# Patient Record
Sex: FEMALE | Race: WHITE | Employment: OTHER | URBAN - METROPOLITAN AREA
[De-identification: names, ages, dates, MRNs, and addresses within clinical notes are randomized per-mention and may not be internally consistent; named-entity substitution may affect disease eponyms.]

---

## 2019-01-01 ENCOUNTER — APPOINTMENT (OUTPATIENT)
Dept: CT IMAGING | Age: 74
DRG: 981 | End: 2019-01-01
Attending: INTERNAL MEDICINE
Payer: MEDICARE

## 2019-01-01 ENCOUNTER — APPOINTMENT (OUTPATIENT)
Dept: GENERAL RADIOLOGY | Age: 74
DRG: 981 | End: 2019-01-01
Attending: INTERNAL MEDICINE
Payer: MEDICARE

## 2019-01-01 ENCOUNTER — APPOINTMENT (OUTPATIENT)
Dept: INTERVENTIONAL RADIOLOGY/VASCULAR | Age: 74
DRG: 981 | End: 2019-01-01
Attending: INTERNAL MEDICINE
Payer: MEDICARE

## 2019-01-01 ENCOUNTER — APPOINTMENT (OUTPATIENT)
Dept: NON INVASIVE DIAGNOSTICS | Age: 74
DRG: 981 | End: 2019-01-01
Attending: INTERNAL MEDICINE
Payer: MEDICARE

## 2019-01-01 ENCOUNTER — HOSPITAL ENCOUNTER (INPATIENT)
Age: 74
LOS: 7 days | DRG: 981 | End: 2019-10-25
Attending: EMERGENCY MEDICINE | Admitting: INTERNAL MEDICINE
Payer: MEDICARE

## 2019-01-01 ENCOUNTER — ANESTHESIA EVENT (OUTPATIENT)
Dept: ICU | Age: 74
DRG: 981 | End: 2019-01-01
Payer: MEDICARE

## 2019-01-01 ENCOUNTER — APPOINTMENT (OUTPATIENT)
Dept: GENERAL RADIOLOGY | Age: 74
DRG: 981 | End: 2019-01-01
Attending: RADIOLOGY
Payer: MEDICARE

## 2019-01-01 ENCOUNTER — APPOINTMENT (OUTPATIENT)
Dept: GENERAL RADIOLOGY | Age: 74
DRG: 981 | End: 2019-01-01
Attending: EMERGENCY MEDICINE
Payer: MEDICARE

## 2019-01-01 ENCOUNTER — ANESTHESIA (OUTPATIENT)
Dept: ICU | Age: 74
DRG: 981 | End: 2019-01-01
Payer: MEDICARE

## 2019-01-01 VITALS
SYSTOLIC BLOOD PRESSURE: 85 MMHG | DIASTOLIC BLOOD PRESSURE: 71 MMHG | RESPIRATION RATE: 20 BRPM | HEIGHT: 61 IN | OXYGEN SATURATION: 82 % | HEART RATE: 119 BPM | WEIGHT: 152.4 LBS | BODY MASS INDEX: 28.77 KG/M2 | TEMPERATURE: 98.7 F

## 2019-01-01 DIAGNOSIS — J44.1 COPD EXACERBATION (HCC): Primary | ICD-10-CM

## 2019-01-01 LAB
ADMINISTERED INITIALS, ADMINIT: AC
ADMINISTERED INITIALS, ADMINIT: NORMAL
ALBUMIN SERPL-MCNC: 1.8 G/DL (ref 3.5–5)
ALBUMIN SERPL-MCNC: 1.8 G/DL (ref 3.5–5)
ALBUMIN SERPL-MCNC: 1.9 G/DL (ref 3.5–5)
ALBUMIN SERPL-MCNC: 2.1 G/DL (ref 3.5–5)
ALBUMIN SERPL-MCNC: 2.3 G/DL (ref 3.5–5)
ALBUMIN SERPL-MCNC: 2.6 G/DL (ref 3.5–5)
ALBUMIN SERPL-MCNC: 3.1 G/DL (ref 3.5–5)
ALBUMIN/GLOB SERPL: 0.8 {RATIO} (ref 1.1–2.2)
ALBUMIN/GLOB SERPL: 0.9 {RATIO} (ref 1.1–2.2)
ALBUMIN/GLOB SERPL: 0.9 {RATIO} (ref 1.1–2.2)
ALBUMIN/GLOB SERPL: 1 {RATIO} (ref 1.1–2.2)
ALBUMIN/GLOB SERPL: 1 {RATIO} (ref 1.1–2.2)
ALBUMIN/GLOB SERPL: 1.8 {RATIO} (ref 1.1–2.2)
ALBUMIN/GLOB SERPL: 2.9 {RATIO} (ref 1.1–2.2)
ALP SERPL-CCNC: 190 U/L (ref 45–117)
ALP SERPL-CCNC: 45 U/L (ref 45–117)
ALP SERPL-CCNC: 46 U/L (ref 45–117)
ALP SERPL-CCNC: 46 U/L (ref 45–117)
ALP SERPL-CCNC: 53 U/L (ref 45–117)
ALP SERPL-CCNC: 57 U/L (ref 45–117)
ALP SERPL-CCNC: 94 U/L (ref 45–117)
ALT SERPL-CCNC: 1023 U/L (ref 12–78)
ALT SERPL-CCNC: 154 U/L (ref 12–78)
ALT SERPL-CCNC: 25 U/L (ref 12–78)
ALT SERPL-CCNC: 29 U/L (ref 12–78)
ALT SERPL-CCNC: 64 U/L (ref 12–78)
ALT SERPL-CCNC: 71 U/L (ref 12–78)
ALT SERPL-CCNC: 845 U/L (ref 12–78)
ANION GAP SERPL CALC-SCNC: 11 MMOL/L (ref 5–15)
ANION GAP SERPL CALC-SCNC: 12 MMOL/L (ref 5–15)
ANION GAP SERPL CALC-SCNC: 5 MMOL/L (ref 5–15)
ANION GAP SERPL CALC-SCNC: 7 MMOL/L (ref 5–15)
ANION GAP SERPL CALC-SCNC: 8 MMOL/L (ref 5–15)
ANION GAP SERPL CALC-SCNC: 9 MMOL/L (ref 5–15)
APTT PPP: 64 SEC (ref 22.1–32)
ARTERIAL PATENCY WRIST A: ABNORMAL
ARTERIAL PATENCY WRIST A: YES
AST SERPL-CCNC: 181 U/L (ref 15–37)
AST SERPL-CCNC: 209 U/L (ref 15–37)
AST SERPL-CCNC: 35 U/L (ref 15–37)
AST SERPL-CCNC: 413 U/L (ref 15–37)
AST SERPL-CCNC: 65 U/L (ref 15–37)
AST SERPL-CCNC: >2000 U/L (ref 15–37)
AST SERPL-CCNC: >2000 U/L (ref 15–37)
ATRIAL RATE: 119 BPM
ATRIAL RATE: 124 BPM
ATRIAL RATE: 140 BPM
AV VELOCITY RATIO: 0.86
B PERT DNA SPEC QL NAA+PROBE: NOT DETECTED
BACTERIA SPEC CULT: NORMAL
BACTERIA SPEC CULT: NORMAL
BASE DEFICIT BLDA-SCNC: 10.4 MMOL/L
BASE DEFICIT BLDA-SCNC: 11.8 MMOL/L
BASE DEFICIT BLDA-SCNC: 12.2 MMOL/L
BASE DEFICIT BLDA-SCNC: 4 MMOL/L
BASE DEFICIT BLDA-SCNC: 5.7 MMOL/L
BASE DEFICIT BLDA-SCNC: 6.1 MMOL/L
BASE DEFICIT BLDA-SCNC: 7.4 MMOL/L
BASE EXCESS BLDA CALC-SCNC: 0.8 MMOL/L
BASOPHILS # BLD: 0 K/UL (ref 0–0.1)
BASOPHILS NFR BLD: 0 % (ref 0–1)
BDY SITE: ABNORMAL
BILIRUB DIRECT SERPL-MCNC: 0.2 MG/DL (ref 0–0.2)
BILIRUB DIRECT SERPL-MCNC: 0.2 MG/DL (ref 0–0.2)
BILIRUB DIRECT SERPL-MCNC: 0.3 MG/DL (ref 0–0.2)
BILIRUB SERPL-MCNC: 0.4 MG/DL (ref 0.2–1)
BILIRUB SERPL-MCNC: 0.4 MG/DL (ref 0.2–1)
BILIRUB SERPL-MCNC: 0.5 MG/DL (ref 0.2–1)
BILIRUB SERPL-MCNC: 0.6 MG/DL (ref 0.2–1)
BILIRUB SERPL-MCNC: 0.6 MG/DL (ref 0.2–1)
BILIRUB SERPL-MCNC: 0.7 MG/DL (ref 0.2–1)
BILIRUB SERPL-MCNC: 1 MG/DL (ref 0.2–1)
BLD PROD TYP BPU: NORMAL
BNP SERPL-MCNC: 367 PG/ML
BPU ID: NORMAL
BUN SERPL-MCNC: 24 MG/DL (ref 6–20)
BUN SERPL-MCNC: 29 MG/DL (ref 6–20)
BUN SERPL-MCNC: 29 MG/DL (ref 6–20)
BUN SERPL-MCNC: 44 MG/DL (ref 6–20)
BUN SERPL-MCNC: 44 MG/DL (ref 6–20)
BUN SERPL-MCNC: 50 MG/DL (ref 6–20)
BUN SERPL-MCNC: 50 MG/DL (ref 6–20)
BUN SERPL-MCNC: 52 MG/DL (ref 6–20)
BUN SERPL-MCNC: 53 MG/DL (ref 6–20)
BUN SERPL-MCNC: 53 MG/DL (ref 6–20)
BUN SERPL-MCNC: 54 MG/DL (ref 6–20)
BUN SERPL-MCNC: 57 MG/DL (ref 6–20)
BUN SERPL-MCNC: 58 MG/DL (ref 6–20)
BUN SERPL-MCNC: 58 MG/DL (ref 6–20)
BUN SERPL-MCNC: 59 MG/DL (ref 6–20)
BUN/CREAT SERPL: 19 (ref 12–20)
BUN/CREAT SERPL: 20 (ref 12–20)
BUN/CREAT SERPL: 21 (ref 12–20)
BUN/CREAT SERPL: 21 (ref 12–20)
BUN/CREAT SERPL: 22 (ref 12–20)
BUN/CREAT SERPL: 23 (ref 12–20)
BUN/CREAT SERPL: 25 (ref 12–20)
BUN/CREAT SERPL: 26 (ref 12–20)
BUN/CREAT SERPL: 28 (ref 12–20)
BUN/CREAT SERPL: 32 (ref 12–20)
BUN/CREAT SERPL: 35 (ref 12–20)
C PNEUM DNA SPEC QL NAA+PROBE: NOT DETECTED
CALCIUM SERPL-MCNC: 5.1 MG/DL (ref 8.5–10.1)
CALCIUM SERPL-MCNC: 5.2 MG/DL (ref 8.5–10.1)
CALCIUM SERPL-MCNC: 5.7 MG/DL (ref 8.5–10.1)
CALCIUM SERPL-MCNC: 5.8 MG/DL (ref 8.5–10.1)
CALCIUM SERPL-MCNC: 6 MG/DL (ref 8.5–10.1)
CALCIUM SERPL-MCNC: 6.1 MG/DL (ref 8.5–10.1)
CALCIUM SERPL-MCNC: 6.1 MG/DL (ref 8.5–10.1)
CALCIUM SERPL-MCNC: 6.2 MG/DL (ref 8.5–10.1)
CALCIUM SERPL-MCNC: 6.6 MG/DL (ref 8.5–10.1)
CALCIUM SERPL-MCNC: 6.8 MG/DL (ref 8.5–10.1)
CALCIUM SERPL-MCNC: 6.8 MG/DL (ref 8.5–10.1)
CALCIUM SERPL-MCNC: 7.2 MG/DL (ref 8.5–10.1)
CALCIUM SERPL-MCNC: 7.6 MG/DL (ref 8.5–10.1)
CALCIUM SERPL-MCNC: 7.7 MG/DL (ref 8.5–10.1)
CALCIUM SERPL-MCNC: 8.5 MG/DL (ref 8.5–10.1)
CALCULATED P AXIS, ECG09: 76 DEGREES
CALCULATED P AXIS, ECG09: 78 DEGREES
CALCULATED P AXIS, ECG09: 81 DEGREES
CALCULATED R AXIS, ECG10: -14 DEGREES
CALCULATED R AXIS, ECG10: -46 DEGREES
CALCULATED R AXIS, ECG10: -5 DEGREES
CALCULATED T AXIS, ECG11: 66 DEGREES
CALCULATED T AXIS, ECG11: 76 DEGREES
CALCULATED T AXIS, ECG11: 79 DEGREES
CHLORIDE SERPL-SCNC: 100 MMOL/L (ref 97–108)
CHLORIDE SERPL-SCNC: 101 MMOL/L (ref 97–108)
CHLORIDE SERPL-SCNC: 101 MMOL/L (ref 97–108)
CHLORIDE SERPL-SCNC: 102 MMOL/L (ref 97–108)
CHLORIDE SERPL-SCNC: 102 MMOL/L (ref 97–108)
CHLORIDE SERPL-SCNC: 104 MMOL/L (ref 97–108)
CHLORIDE SERPL-SCNC: 105 MMOL/L (ref 97–108)
CHLORIDE SERPL-SCNC: 94 MMOL/L (ref 97–108)
CHLORIDE SERPL-SCNC: 95 MMOL/L (ref 97–108)
CHLORIDE SERPL-SCNC: 96 MMOL/L (ref 97–108)
CHLORIDE SERPL-SCNC: 96 MMOL/L (ref 97–108)
CHLORIDE SERPL-SCNC: 97 MMOL/L (ref 97–108)
CHLORIDE SERPL-SCNC: 98 MMOL/L (ref 97–108)
CK MB CFR SERPL CALC: 2.9 % (ref 0–2.5)
CK MB SERPL-MCNC: 10.5 NG/ML (ref 5–25)
CK SERPL-CCNC: 363 U/L (ref 26–192)
CO2 SERPL-SCNC: 19 MMOL/L (ref 21–32)
CO2 SERPL-SCNC: 23 MMOL/L (ref 21–32)
CO2 SERPL-SCNC: 23 MMOL/L (ref 21–32)
CO2 SERPL-SCNC: 24 MMOL/L (ref 21–32)
CO2 SERPL-SCNC: 24 MMOL/L (ref 21–32)
CO2 SERPL-SCNC: 25 MMOL/L (ref 21–32)
CO2 SERPL-SCNC: 26 MMOL/L (ref 21–32)
CO2 SERPL-SCNC: 27 MMOL/L (ref 21–32)
CO2 SERPL-SCNC: 28 MMOL/L (ref 21–32)
CO2 SERPL-SCNC: 28 MMOL/L (ref 21–32)
COMMENT, HOLDF: NORMAL
COMMENT, HOLDF: NORMAL
CREAT SERPL-MCNC: 1.25 MG/DL (ref 0.55–1.02)
CREAT SERPL-MCNC: 1.26 MG/DL (ref 0.55–1.02)
CREAT SERPL-MCNC: 1.27 MG/DL (ref 0.55–1.02)
CREAT SERPL-MCNC: 1.28 MG/DL (ref 0.55–1.02)
CREAT SERPL-MCNC: 1.65 MG/DL (ref 0.55–1.02)
CREAT SERPL-MCNC: 1.68 MG/DL (ref 0.55–1.02)
CREAT SERPL-MCNC: 1.78 MG/DL (ref 0.55–1.02)
CREAT SERPL-MCNC: 2.01 MG/DL (ref 0.55–1.02)
CREAT SERPL-MCNC: 2.36 MG/DL (ref 0.55–1.02)
CREAT SERPL-MCNC: 2.55 MG/DL (ref 0.55–1.02)
CREAT SERPL-MCNC: 2.61 MG/DL (ref 0.55–1.02)
CREAT SERPL-MCNC: 2.64 MG/DL (ref 0.55–1.02)
CREAT SERPL-MCNC: 2.75 MG/DL (ref 0.55–1.02)
CREAT SERPL-MCNC: 2.84 MG/DL (ref 0.55–1.02)
CREAT SERPL-MCNC: 2.94 MG/DL (ref 0.55–1.02)
D50 ADMINISTERED, D50ADM: 0 ML
D50 ADMINISTERED, D50ADM: 12 ML
D50 ADMINISTERED, D50ADM: 8 ML
D50 ADMINISTERED, D50ADM: 9 ML
D50 ORDER, D50ORD: 0 ML
D50 ORDER, D50ORD: 12 ML
D50 ORDER, D50ORD: 8 ML
D50 ORDER, D50ORD: 9 ML
DIAGNOSIS, 93000: NORMAL
DIFFERENTIAL METHOD BLD: ABNORMAL
ECHO AO ROOT DIAM: 2.14 CM
ECHO AV AREA PEAK VELOCITY: 2.8 CM2
ECHO AV AREA/BSA PEAK VELOCITY: 1.6 CM2/M2
ECHO AV PEAK GRADIENT: 9.6 MMHG
ECHO AV PEAK VELOCITY: 154.79 CM/S
ECHO IVC SNIFF: 1.62 CM
ECHO LA AREA 4C: 9.5 CM2
ECHO LA MAJOR AXIS: 3.85 CM
ECHO LA TO AORTIC ROOT RATIO: 0.55
ECHO LA VOL 2C: 18.19 ML (ref 22–52)
ECHO LA VOL 4C: 11.86 ML (ref 22–52)
ECHO LA VOL BP: 15.81 ML (ref 22–52)
ECHO LA VOL/BSA BIPLANE: 9.32 ML/M2 (ref 16–28)
ECHO LA VOLUME INDEX A2C: 10.73 ML/M2 (ref 16–28)
ECHO LA VOLUME INDEX A4C: 6.99 ML/M2 (ref 16–28)
ECHO LV E' LATERAL VELOCITY: 9.1 CENTIMETER/SECOND
ECHO LV E' SEPTAL VELOCITY: 4.2 CENTIMETER/SECOND
ECHO LV EDV TEICHHOLZ: 0.11 ML
ECHO LV EJECTION FRACTION BIPLANE: 63.8 % (ref 55–100)
ECHO LV ESV BP: 6.25 ML (ref 19–49)
ECHO LV ESV INDEX BP: 3.7 ML/M2
ECHO LV ESV TEICHHOLZ: 0.04 ML
ECHO LV INTERNAL DIMENSION DIASTOLIC: 12.96 CM (ref 3.9–5.3)
ECHO LV INTERNAL DIMENSION SYSTOLIC: 8.72 CM
ECHO LV IVSD: 0.99 CM (ref 0.6–0.9)
ECHO LV MASS 2D: 53.79 G (ref 67–162)
ECHO LV MASS INDEX 2D: 31.7 G/M2 (ref 43–95)
ECHO LV POSTERIOR WALL DIASTOLIC: 87.1 CM (ref 0.6–0.9)
ECHO LVOT DIAM: 2.04 CM
ECHO LVOT PEAK GRADIENT: 7.1 MMHG
ECHO LVOT PEAK VELOCITY: 133.39 CM/S
ECHO LVOT SV: 11 ML
ECHO MV A VELOCITY: 117.55 CM/S
ECHO MV AREA PHT: 5 CM2
ECHO MV E DECELERATION TIME (DT): 150.2 MS
ECHO MV E VELOCITY: 67.09 CM/S
ECHO MV E/A RATIO: 0.57
ECHO MV PRESSURE HALF TIME (PHT): 43.6 MS
ECHO MV REGURGITANT PEAK GRADIENT: 59.5 MMHG
ECHO MV REGURGITANT PEAK VELOCITY: 385.75 CM/S
ECHO PV MAX VELOCITY: 133.77 CM/S
ECHO PV PEAK GRADIENT: 7.2 MMHG
ECHO RV INTERNAL DIMENSION: 2.04 CM
ECHO RV TAPSE: 1.16 CM (ref 1.5–2)
ECHO TV REGURGITANT MAX VELOCITY: 293.6 CM/S
ECHO TV REGURGITANT PEAK GRADIENT: 34.5 MMHG
EOSINOPHIL # BLD: 0 K/UL (ref 0–0.4)
EOSINOPHIL NFR BLD: 0 % (ref 0–7)
EPAP/CPAP/PEEP, PAPEEP: 5
EPAP/CPAP/PEEP, PAPEEP: 6
ERYTHROCYTE [DISTWIDTH] IN BLOOD BY AUTOMATED COUNT: 13 % (ref 11.5–14.5)
ERYTHROCYTE [DISTWIDTH] IN BLOOD BY AUTOMATED COUNT: 13.1 % (ref 11.5–14.5)
ERYTHROCYTE [DISTWIDTH] IN BLOOD BY AUTOMATED COUNT: 13.2 % (ref 11.5–14.5)
ERYTHROCYTE [DISTWIDTH] IN BLOOD BY AUTOMATED COUNT: 13.3 % (ref 11.5–14.5)
ERYTHROCYTE [DISTWIDTH] IN BLOOD BY AUTOMATED COUNT: 13.4 % (ref 11.5–14.5)
ERYTHROCYTE [DISTWIDTH] IN BLOOD BY AUTOMATED COUNT: 14 % (ref 11.5–14.5)
ERYTHROCYTE [DISTWIDTH] IN BLOOD BY AUTOMATED COUNT: 14.1 % (ref 11.5–14.5)
ERYTHROCYTE [DISTWIDTH] IN BLOOD BY AUTOMATED COUNT: 14.5 % (ref 11.5–14.5)
ERYTHROCYTE [DISTWIDTH] IN BLOOD BY AUTOMATED COUNT: 15 % (ref 11.5–14.5)
EST. AVERAGE GLUCOSE BLD GHB EST-MCNC: 194 MG/DL
FIBRINOGEN PPP-MCNC: 147 MG/DL (ref 200–475)
FIBRINOGEN PPP-MCNC: 150 MG/DL (ref 200–475)
FIO2 ON VENT: 35 %
FIO2 ON VENT: 40 %
FLUAV H1 2009 PAND RNA SPEC QL NAA+PROBE: NOT DETECTED
FLUAV H1 RNA SPEC QL NAA+PROBE: NOT DETECTED
FLUAV H3 RNA SPEC QL NAA+PROBE: NOT DETECTED
FLUAV SUBTYP SPEC NAA+PROBE: NOT DETECTED
FLUBV RNA SPEC QL NAA+PROBE: NOT DETECTED
GAS FLOW.O2 O2 DELIVERY SYS: 2 L/MIN
GAS FLOW.O2 O2 DELIVERY SYS: 3 L/MIN
GAS FLOW.O2 SETTING OXYMISER: 15 L/MIN
GAS FLOW.O2 SETTING OXYMISER: 20 L/MIN
GLOBULIN SER CALC-MCNC: 0.9 G/DL (ref 2–4)
GLOBULIN SER CALC-MCNC: 1.3 G/DL (ref 2–4)
GLOBULIN SER CALC-MCNC: 1.9 G/DL (ref 2–4)
GLOBULIN SER CALC-MCNC: 1.9 G/DL (ref 2–4)
GLOBULIN SER CALC-MCNC: 2 G/DL (ref 2–4)
GLOBULIN SER CALC-MCNC: 2.1 G/DL (ref 2–4)
GLOBULIN SER CALC-MCNC: 3.9 G/DL (ref 2–4)
GLSCOM COMMENTS: NORMAL
GLUCOSE BLD STRIP.AUTO-MCNC: 100 MG/DL (ref 65–100)
GLUCOSE BLD STRIP.AUTO-MCNC: 107 MG/DL (ref 65–100)
GLUCOSE BLD STRIP.AUTO-MCNC: 110 MG/DL (ref 65–100)
GLUCOSE BLD STRIP.AUTO-MCNC: 111 MG/DL (ref 65–100)
GLUCOSE BLD STRIP.AUTO-MCNC: 115 MG/DL (ref 65–100)
GLUCOSE BLD STRIP.AUTO-MCNC: 116 MG/DL (ref 65–100)
GLUCOSE BLD STRIP.AUTO-MCNC: 121 MG/DL (ref 65–100)
GLUCOSE BLD STRIP.AUTO-MCNC: 121 MG/DL (ref 65–100)
GLUCOSE BLD STRIP.AUTO-MCNC: 125 MG/DL (ref 65–100)
GLUCOSE BLD STRIP.AUTO-MCNC: 128 MG/DL (ref 65–100)
GLUCOSE BLD STRIP.AUTO-MCNC: 137 MG/DL (ref 65–100)
GLUCOSE BLD STRIP.AUTO-MCNC: 137 MG/DL (ref 65–100)
GLUCOSE BLD STRIP.AUTO-MCNC: 138 MG/DL (ref 65–100)
GLUCOSE BLD STRIP.AUTO-MCNC: 144 MG/DL (ref 65–100)
GLUCOSE BLD STRIP.AUTO-MCNC: 148 MG/DL (ref 65–100)
GLUCOSE BLD STRIP.AUTO-MCNC: 152 MG/DL (ref 65–100)
GLUCOSE BLD STRIP.AUTO-MCNC: 155 MG/DL (ref 65–100)
GLUCOSE BLD STRIP.AUTO-MCNC: 166 MG/DL (ref 65–100)
GLUCOSE BLD STRIP.AUTO-MCNC: 166 MG/DL (ref 65–100)
GLUCOSE BLD STRIP.AUTO-MCNC: 170 MG/DL (ref 65–100)
GLUCOSE BLD STRIP.AUTO-MCNC: 171 MG/DL (ref 65–100)
GLUCOSE BLD STRIP.AUTO-MCNC: 173 MG/DL (ref 65–100)
GLUCOSE BLD STRIP.AUTO-MCNC: 175 MG/DL (ref 65–100)
GLUCOSE BLD STRIP.AUTO-MCNC: 185 MG/DL (ref 65–100)
GLUCOSE BLD STRIP.AUTO-MCNC: 185 MG/DL (ref 65–100)
GLUCOSE BLD STRIP.AUTO-MCNC: 189 MG/DL (ref 65–100)
GLUCOSE BLD STRIP.AUTO-MCNC: 195 MG/DL (ref 65–100)
GLUCOSE BLD STRIP.AUTO-MCNC: 196 MG/DL (ref 65–100)
GLUCOSE BLD STRIP.AUTO-MCNC: 196 MG/DL (ref 65–100)
GLUCOSE BLD STRIP.AUTO-MCNC: 197 MG/DL (ref 65–100)
GLUCOSE BLD STRIP.AUTO-MCNC: 200 MG/DL (ref 65–100)
GLUCOSE BLD STRIP.AUTO-MCNC: 210 MG/DL (ref 65–100)
GLUCOSE BLD STRIP.AUTO-MCNC: 211 MG/DL (ref 65–100)
GLUCOSE BLD STRIP.AUTO-MCNC: 215 MG/DL (ref 65–100)
GLUCOSE BLD STRIP.AUTO-MCNC: 219 MG/DL (ref 65–100)
GLUCOSE BLD STRIP.AUTO-MCNC: 225 MG/DL (ref 65–100)
GLUCOSE BLD STRIP.AUTO-MCNC: 228 MG/DL (ref 65–100)
GLUCOSE BLD STRIP.AUTO-MCNC: 230 MG/DL (ref 65–100)
GLUCOSE BLD STRIP.AUTO-MCNC: 235 MG/DL (ref 65–100)
GLUCOSE BLD STRIP.AUTO-MCNC: 238 MG/DL (ref 65–100)
GLUCOSE BLD STRIP.AUTO-MCNC: 238 MG/DL (ref 65–100)
GLUCOSE BLD STRIP.AUTO-MCNC: 278 MG/DL (ref 65–100)
GLUCOSE BLD STRIP.AUTO-MCNC: 322 MG/DL (ref 65–100)
GLUCOSE BLD STRIP.AUTO-MCNC: 336 MG/DL (ref 65–100)
GLUCOSE BLD STRIP.AUTO-MCNC: 349 MG/DL (ref 65–100)
GLUCOSE BLD STRIP.AUTO-MCNC: 410 MG/DL (ref 65–100)
GLUCOSE BLD STRIP.AUTO-MCNC: 42 MG/DL (ref 65–100)
GLUCOSE BLD STRIP.AUTO-MCNC: 48 MG/DL (ref 65–100)
GLUCOSE BLD STRIP.AUTO-MCNC: 54 MG/DL (ref 65–100)
GLUCOSE BLD STRIP.AUTO-MCNC: 57 MG/DL (ref 65–100)
GLUCOSE BLD STRIP.AUTO-MCNC: 65 MG/DL (ref 65–100)
GLUCOSE BLD STRIP.AUTO-MCNC: 71 MG/DL (ref 65–100)
GLUCOSE BLD STRIP.AUTO-MCNC: 71 MG/DL (ref 65–100)
GLUCOSE BLD STRIP.AUTO-MCNC: 78 MG/DL (ref 65–100)
GLUCOSE BLD STRIP.AUTO-MCNC: 80 MG/DL (ref 65–100)
GLUCOSE BLD STRIP.AUTO-MCNC: 80 MG/DL (ref 65–100)
GLUCOSE BLD STRIP.AUTO-MCNC: 86 MG/DL (ref 65–100)
GLUCOSE BLD STRIP.AUTO-MCNC: 92 MG/DL (ref 65–100)
GLUCOSE BLD STRIP.AUTO-MCNC: 95 MG/DL (ref 65–100)
GLUCOSE BLD STRIP.AUTO-MCNC: 98 MG/DL (ref 65–100)
GLUCOSE SERPL-MCNC: 124 MG/DL (ref 65–100)
GLUCOSE SERPL-MCNC: 140 MG/DL (ref 65–100)
GLUCOSE SERPL-MCNC: 154 MG/DL (ref 65–100)
GLUCOSE SERPL-MCNC: 154 MG/DL (ref 65–100)
GLUCOSE SERPL-MCNC: 183 MG/DL (ref 65–100)
GLUCOSE SERPL-MCNC: 184 MG/DL (ref 65–100)
GLUCOSE SERPL-MCNC: 190 MG/DL (ref 65–100)
GLUCOSE SERPL-MCNC: 201 MG/DL (ref 65–100)
GLUCOSE SERPL-MCNC: 204 MG/DL (ref 65–100)
GLUCOSE SERPL-MCNC: 237 MG/DL (ref 65–100)
GLUCOSE SERPL-MCNC: 259 MG/DL (ref 65–100)
GLUCOSE SERPL-MCNC: 267 MG/DL (ref 65–100)
GLUCOSE SERPL-MCNC: 277 MG/DL (ref 65–100)
GLUCOSE SERPL-MCNC: 380 MG/DL (ref 65–100)
GLUCOSE SERPL-MCNC: 96 MG/DL (ref 65–100)
GLUCOSE, GLC: 107 MG/DL
GLUCOSE, GLC: 110 MG/DL
GLUCOSE, GLC: 111 MG/DL
GLUCOSE, GLC: 125 MG/DL
GLUCOSE, GLC: 128 MG/DL
GLUCOSE, GLC: 137 MG/DL
GLUCOSE, GLC: 138 MG/DL
GLUCOSE, GLC: 144 MG/DL
GLUCOSE, GLC: 148 MG/DL
GLUCOSE, GLC: 152 MG/DL
GLUCOSE, GLC: 166 MG/DL
GLUCOSE, GLC: 170 MG/DL
GLUCOSE, GLC: 171 MG/DL
GLUCOSE, GLC: 175 MG/DL
GLUCOSE, GLC: 185 MG/DL
GLUCOSE, GLC: 185 MG/DL
GLUCOSE, GLC: 189 MG/DL
GLUCOSE, GLC: 196 MG/DL
GLUCOSE, GLC: 200 MG/DL
GLUCOSE, GLC: 210 MG/DL
GLUCOSE, GLC: 219 MG/DL
GLUCOSE, GLC: 228 MG/DL
GLUCOSE, GLC: 238 MG/DL
GLUCOSE, GLC: 71 MG/DL
GLUCOSE, GLC: 78 MG/DL
GLUCOSE, GLC: 80 MG/DL
GLUCOSE, GLC: 86 MG/DL
GLUCOSE, GLC: 92 MG/DL
HADV DNA SPEC QL NAA+PROBE: NOT DETECTED
HAPTOGLOB SERPL-MCNC: 43 MG/DL (ref 30–200)
HBA1C MFR BLD: 8.4 % (ref 4.2–6.3)
HCO3 BLDA-SCNC: 16 MMOL/L (ref 22–26)
HCO3 BLDA-SCNC: 17 MMOL/L (ref 22–26)
HCO3 BLDA-SCNC: 17 MMOL/L (ref 22–26)
HCO3 BLDA-SCNC: 21 MMOL/L (ref 22–26)
HCO3 BLDA-SCNC: 23 MMOL/L (ref 22–26)
HCO3 BLDA-SCNC: 26 MMOL/L (ref 22–26)
HCOV 229E RNA SPEC QL NAA+PROBE: NOT DETECTED
HCOV HKU1 RNA SPEC QL NAA+PROBE: NOT DETECTED
HCOV NL63 RNA SPEC QL NAA+PROBE: NOT DETECTED
HCOV OC43 RNA SPEC QL NAA+PROBE: NOT DETECTED
HCT VFR BLD AUTO: 15.8 % (ref 35–47)
HCT VFR BLD AUTO: 18.3 % (ref 35–47)
HCT VFR BLD AUTO: 18.4 % (ref 35–47)
HCT VFR BLD AUTO: 19.9 % (ref 35–47)
HCT VFR BLD AUTO: 20.8 % (ref 35–47)
HCT VFR BLD AUTO: 21 % (ref 35–47)
HCT VFR BLD AUTO: 21.3 % (ref 35–47)
HCT VFR BLD AUTO: 22.5 % (ref 35–47)
HCT VFR BLD AUTO: 24.6 % (ref 35–47)
HCT VFR BLD AUTO: 25 % (ref 35–47)
HCT VFR BLD AUTO: 25.5 % (ref 35–47)
HCT VFR BLD AUTO: 27.1 % (ref 35–47)
HCT VFR BLD AUTO: 27.3 % (ref 35–47)
HCT VFR BLD AUTO: 29.4 % (ref 35–47)
HCT VFR BLD AUTO: 30.8 % (ref 35–47)
HCT VFR BLD AUTO: 32.8 % (ref 35–47)
HCT VFR BLD AUTO: 33.1 % (ref 35–47)
HCT VFR BLD AUTO: 33.6 % (ref 35–47)
HCT VFR BLD AUTO: 34.5 % (ref 35–47)
HCT VFR BLD AUTO: 38.6 % (ref 35–47)
HCT VFR BLD AUTO: 41.7 % (ref 35–47)
HGB BLD-MCNC: 10.1 G/DL (ref 11.5–16)
HGB BLD-MCNC: 10.2 G/DL (ref 11.5–16)
HGB BLD-MCNC: 11 G/DL (ref 11.5–16)
HGB BLD-MCNC: 11.1 G/DL (ref 11.5–16)
HGB BLD-MCNC: 11.4 G/DL (ref 11.5–16)
HGB BLD-MCNC: 12.2 G/DL (ref 11.5–16)
HGB BLD-MCNC: 13.9 G/DL (ref 11.5–16)
HGB BLD-MCNC: 5.2 G/DL (ref 11.5–16)
HGB BLD-MCNC: 6.2 G/DL (ref 11.5–16)
HGB BLD-MCNC: 6.4 G/DL (ref 11.5–16)
HGB BLD-MCNC: 6.4 G/DL (ref 11.5–16)
HGB BLD-MCNC: 7 G/DL (ref 11.5–16)
HGB BLD-MCNC: 7 G/DL (ref 11.5–16)
HGB BLD-MCNC: 7.1 G/DL (ref 11.5–16)
HGB BLD-MCNC: 7.7 G/DL (ref 11.5–16)
HGB BLD-MCNC: 7.8 G/DL (ref 11.5–16)
HGB BLD-MCNC: 8.5 G/DL (ref 11.5–16)
HGB BLD-MCNC: 8.7 G/DL (ref 11.5–16)
HGB BLD-MCNC: 9.1 G/DL (ref 11.5–16)
HGB BLD-MCNC: 9.3 G/DL (ref 11.5–16)
HGB BLD-MCNC: 9.9 G/DL (ref 11.5–16)
HIGH TARGET, HITG: 180 MG/DL
HMPV RNA SPEC QL NAA+PROBE: NOT DETECTED
HPIV1 RNA SPEC QL NAA+PROBE: DETECTED
HPIV2 RNA SPEC QL NAA+PROBE: NOT DETECTED
HPIV3 RNA SPEC QL NAA+PROBE: NOT DETECTED
HPIV4 RNA SPEC QL NAA+PROBE: NOT DETECTED
IMM GRANULOCYTES # BLD AUTO: 0 K/UL
IMM GRANULOCYTES # BLD AUTO: 0 K/UL
IMM GRANULOCYTES # BLD AUTO: 0.1 K/UL (ref 0–0.04)
IMM GRANULOCYTES # BLD AUTO: 0.2 K/UL (ref 0–0.04)
IMM GRANULOCYTES NFR BLD AUTO: 0 %
IMM GRANULOCYTES NFR BLD AUTO: 0 %
IMM GRANULOCYTES NFR BLD AUTO: 1 % (ref 0–0.5)
IMM GRANULOCYTES NFR BLD AUTO: 1 % (ref 0–0.5)
INR PPP: 1.3 (ref 0.9–1.1)
INR PPP: 1.7 (ref 0.9–1.1)
INSULIN ADMINSTERED, INSADM: 0 UNITS/HOUR
INSULIN ADMINSTERED, INSADM: 0.5 UNITS/HOUR
INSULIN ADMINSTERED, INSADM: 0.8 UNITS/HOUR
INSULIN ADMINSTERED, INSADM: 0.8 UNITS/HOUR
INSULIN ADMINSTERED, INSADM: 0.9 UNITS/HOUR
INSULIN ADMINSTERED, INSADM: 1 UNITS/HOUR
INSULIN ADMINSTERED, INSADM: 1.4 UNITS/HOUR
INSULIN ADMINSTERED, INSADM: 1.4 UNITS/HOUR
INSULIN ADMINSTERED, INSADM: 2.1 UNITS/HOUR
INSULIN ADMINSTERED, INSADM: 2.2 UNITS/HOUR
INSULIN ADMINSTERED, INSADM: 2.3 UNITS/HOUR
INSULIN ADMINSTERED, INSADM: 2.5 UNITS/HOUR
INSULIN ADMINSTERED, INSADM: 2.6 UNITS/HOUR
INSULIN ADMINSTERED, INSADM: 3 UNITS/HOUR
INSULIN ADMINSTERED, INSADM: 3.8 UNITS/HOUR
INSULIN ADMINSTERED, INSADM: 4.4 UNITS/HOUR
INSULIN ADMINSTERED, INSADM: 5.3 UNITS/HOUR
INSULIN ADMINSTERED, INSADM: 6.4 UNITS/HOUR
INSULIN ORDER, INSORD: 0 UNITS/HOUR
INSULIN ORDER, INSORD: 0.5 UNITS/HOUR
INSULIN ORDER, INSORD: 0.8 UNITS/HOUR
INSULIN ORDER, INSORD: 0.8 UNITS/HOUR
INSULIN ORDER, INSORD: 0.9 UNITS/HOUR
INSULIN ORDER, INSORD: 1 UNITS/HOUR
INSULIN ORDER, INSORD: 1.4 UNITS/HOUR
INSULIN ORDER, INSORD: 1.4 UNITS/HOUR
INSULIN ORDER, INSORD: 2.1 UNITS/HOUR
INSULIN ORDER, INSORD: 2.2 UNITS/HOUR
INSULIN ORDER, INSORD: 2.3 UNITS/HOUR
INSULIN ORDER, INSORD: 2.5 UNITS/HOUR
INSULIN ORDER, INSORD: 2.6 UNITS/HOUR
INSULIN ORDER, INSORD: 3 UNITS/HOUR
INSULIN ORDER, INSORD: 3.8 UNITS/HOUR
INSULIN ORDER, INSORD: 4.4 UNITS/HOUR
INSULIN ORDER, INSORD: 5.3 UNITS/HOUR
INSULIN ORDER, INSORD: 6.4 UNITS/HOUR
IPAP/PIP, IPAPIP: 12
LACTATE SERPL-SCNC: 0.8 MMOL/L (ref 0.4–2)
LACTATE SERPL-SCNC: 2.2 MMOL/L (ref 0.4–2)
LACTATE SERPL-SCNC: 2.2 MMOL/L (ref 0.4–2)
LACTATE SERPL-SCNC: 2.9 MMOL/L (ref 0.4–2)
LACTATE SERPL-SCNC: 2.9 MMOL/L (ref 0.4–2)
LACTATE SERPL-SCNC: 3.3 MMOL/L (ref 0.4–2)
LACTATE SERPL-SCNC: 3.5 MMOL/L (ref 0.4–2)
LACTATE SERPL-SCNC: 3.6 MMOL/L (ref 0.4–2)
LACTATE SERPL-SCNC: 3.7 MMOL/L (ref 0.4–2)
LACTATE SERPL-SCNC: 3.8 MMOL/L (ref 0.4–2)
LACTATE SERPL-SCNC: 4 MMOL/L (ref 0.4–2)
LACTATE SERPL-SCNC: 4.1 MMOL/L (ref 0.4–2)
LACTATE SERPL-SCNC: 4.2 MMOL/L (ref 0.4–2)
LACTATE SERPL-SCNC: 4.9 MMOL/L (ref 0.4–2)
LACTATE SERPL-SCNC: 6.3 MMOL/L (ref 0.4–2)
LACTATE SERPL-SCNC: 8 MMOL/L (ref 0.4–2)
LDH SERPL L TO P-CCNC: 694 U/L (ref 81–246)
LOW TARGET, LOT: 140 MG/DL
LVFS 2D: 32.68 %
LVSV (TEICH): 6.31 ML
LYMPHOCYTES # BLD: 0.3 K/UL (ref 0.8–3.5)
LYMPHOCYTES # BLD: 0.4 K/UL (ref 0.8–3.5)
LYMPHOCYTES NFR BLD: 2 % (ref 12–49)
LYMPHOCYTES NFR BLD: 2 % (ref 12–49)
LYMPHOCYTES NFR BLD: 3 % (ref 12–49)
LYMPHOCYTES NFR BLD: 3 % (ref 12–49)
M PNEUMO DNA SPEC QL NAA+PROBE: NOT DETECTED
MAGNESIUM SERPL-MCNC: 1.8 MG/DL (ref 1.6–2.4)
MAGNESIUM SERPL-MCNC: 1.9 MG/DL (ref 1.6–2.4)
MAGNESIUM SERPL-MCNC: 1.9 MG/DL (ref 1.6–2.4)
MAGNESIUM SERPL-MCNC: 2 MG/DL (ref 1.6–2.4)
MAGNESIUM SERPL-MCNC: 2 MG/DL (ref 1.6–2.4)
MAGNESIUM SERPL-MCNC: 2.1 MG/DL (ref 1.6–2.4)
MCH RBC QN AUTO: 28.7 PG (ref 26–34)
MCH RBC QN AUTO: 28.7 PG (ref 26–34)
MCH RBC QN AUTO: 29.1 PG (ref 26–34)
MCH RBC QN AUTO: 29.2 PG (ref 26–34)
MCH RBC QN AUTO: 29.7 PG (ref 26–34)
MCH RBC QN AUTO: 29.9 PG (ref 26–34)
MCH RBC QN AUTO: 29.9 PG (ref 26–34)
MCH RBC QN AUTO: 30.8 PG (ref 26–34)
MCH RBC QN AUTO: 31 PG (ref 26–34)
MCHC RBC AUTO-ENTMCNC: 31.1 G/DL (ref 30–36.5)
MCHC RBC AUTO-ENTMCNC: 31.6 G/DL (ref 30–36.5)
MCHC RBC AUTO-ENTMCNC: 31.7 G/DL (ref 30–36.5)
MCHC RBC AUTO-ENTMCNC: 32.7 G/DL (ref 30–36.5)
MCHC RBC AUTO-ENTMCNC: 33 G/DL (ref 30–36.5)
MCHC RBC AUTO-ENTMCNC: 33.3 G/DL (ref 30–36.5)
MCHC RBC AUTO-ENTMCNC: 33.6 G/DL (ref 30–36.5)
MCHC RBC AUTO-ENTMCNC: 34.1 G/DL (ref 30–36.5)
MCHC RBC AUTO-ENTMCNC: 34.8 G/DL (ref 30–36.5)
MCV RBC AUTO: 87.8 FL (ref 80–99)
MCV RBC AUTO: 88.5 FL (ref 80–99)
MCV RBC AUTO: 88.9 FL (ref 80–99)
MCV RBC AUTO: 89.7 FL (ref 80–99)
MCV RBC AUTO: 89.8 FL (ref 80–99)
MCV RBC AUTO: 90.4 FL (ref 80–99)
MCV RBC AUTO: 92.2 FL (ref 80–99)
MCV RBC AUTO: 92.3 FL (ref 80–99)
MCV RBC AUTO: 92.4 FL (ref 80–99)
METAMYELOCYTES NFR BLD MANUAL: 1 %
METAMYELOCYTES NFR BLD MANUAL: 2 %
MINUTES UNTIL NEXT BG, NBG: 15 MIN
MINUTES UNTIL NEXT BG, NBG: 60 MIN
MONOCYTES # BLD: 0.7 K/UL (ref 0–1)
MONOCYTES # BLD: 0.8 K/UL (ref 0–1)
MONOCYTES # BLD: 0.8 K/UL (ref 0–1)
MONOCYTES # BLD: 1.4 K/UL (ref 0–1)
MONOCYTES NFR BLD: 4 % (ref 5–13)
MONOCYTES NFR BLD: 5 % (ref 5–13)
MONOCYTES NFR BLD: 7 % (ref 5–13)
MONOCYTES NFR BLD: 9 % (ref 5–13)
MULTIPLIER, MUL: 0
MULTIPLIER, MUL: 0.01
MULTIPLIER, MUL: 0.02
MULTIPLIER, MUL: 0.03
MULTIPLIER, MUL: 0.04
MULTIPLIER, MUL: 0.04
MV DEC SLOPE: 4.47
MYELOCYTES NFR BLD MANUAL: 1 %
NEUTS BAND NFR BLD MANUAL: 1 % (ref 0–6)
NEUTS BAND NFR BLD MANUAL: 4 % (ref 0–6)
NEUTS SEG # BLD: 12 K/UL (ref 1.8–8)
NEUTS SEG # BLD: 18.2 K/UL (ref 1.8–8)
NEUTS SEG # BLD: 18.7 K/UL (ref 1.8–8)
NEUTS SEG # BLD: 7.2 K/UL (ref 1.8–8)
NEUTS SEG NFR BLD: 87 % (ref 32–75)
NEUTS SEG NFR BLD: 88 % (ref 32–75)
NEUTS SEG NFR BLD: 89 % (ref 32–75)
NEUTS SEG NFR BLD: 90 % (ref 32–75)
NRBC # BLD: 0 K/UL (ref 0–0.01)
NRBC # BLD: 0.02 K/UL (ref 0–0.01)
NRBC # BLD: 0.03 K/UL (ref 0–0.01)
NRBC # BLD: 0.05 K/UL (ref 0–0.01)
NRBC # BLD: 0.06 K/UL (ref 0–0.01)
NRBC # BLD: 0.21 K/UL (ref 0–0.01)
NRBC BLD-RTO: 0 PER 100 WBC
NRBC BLD-RTO: 0.1 PER 100 WBC
NRBC BLD-RTO: 0.2 PER 100 WBC
NRBC BLD-RTO: 0.3 PER 100 WBC
NRBC BLD-RTO: 0.7 PER 100 WBC
NRBC BLD-RTO: 1.9 PER 100 WBC
ORDER INITIALS, ORDINIT: AC
ORDER INITIALS, ORDINIT: NORMAL
P-R INTERVAL, ECG05: 136 MS
P-R INTERVAL, ECG05: 138 MS
P-R INTERVAL, ECG05: 142 MS
PCO2 BLDA: 40 MMHG (ref 35–45)
PCO2 BLDA: 43 MMHG (ref 35–45)
PCO2 BLDA: 43 MMHG (ref 35–45)
PCO2 BLDA: 44 MMHG (ref 35–45)
PCO2 BLDA: 45 MMHG (ref 35–45)
PCO2 BLDA: 52 MMHG (ref 35–45)
PCO2 BLDA: 53 MMHG (ref 35–45)
PCO2 BLDA: 53 MMHG (ref 35–45)
PH BLDA: 7.13 [PH] (ref 7.35–7.45)
PH BLDA: 7.19 [PH] (ref 7.35–7.45)
PH BLDA: 7.21 [PH] (ref 7.35–7.45)
PH BLDA: 7.23 [PH] (ref 7.35–7.45)
PH BLDA: 7.28 [PH] (ref 7.35–7.45)
PH BLDA: 7.28 [PH] (ref 7.35–7.45)
PH BLDA: 7.3 [PH] (ref 7.35–7.45)
PH BLDA: 7.39 [PH] (ref 7.35–7.45)
PHOSPHATE SERPL-MCNC: 2.2 MG/DL (ref 2.6–4.7)
PHOSPHATE SERPL-MCNC: 2.4 MG/DL (ref 2.6–4.7)
PHOSPHATE SERPL-MCNC: 4.5 MG/DL (ref 2.6–4.7)
PHOSPHATE SERPL-MCNC: 7 MG/DL (ref 2.6–4.7)
PHOSPHATE SERPL-MCNC: 7.4 MG/DL (ref 2.6–4.7)
PLATELET # BLD AUTO: 109 K/UL (ref 150–400)
PLATELET # BLD AUTO: 119 K/UL (ref 150–400)
PLATELET # BLD AUTO: 131 K/UL (ref 150–400)
PLATELET # BLD AUTO: 138 K/UL (ref 150–400)
PLATELET # BLD AUTO: 170 K/UL (ref 150–400)
PLATELET # BLD AUTO: 183 K/UL (ref 150–400)
PLATELET # BLD AUTO: 185 K/UL (ref 150–400)
PLATELET # BLD AUTO: 70 K/UL (ref 150–400)
PLATELET # BLD AUTO: 77 K/UL (ref 150–400)
PLATELET # BLD AUTO: 93 K/UL (ref 150–400)
PMV BLD AUTO: 10 FL (ref 8.9–12.9)
PMV BLD AUTO: 10.2 FL (ref 8.9–12.9)
PMV BLD AUTO: 10.3 FL (ref 8.9–12.9)
PMV BLD AUTO: 10.5 FL (ref 8.9–12.9)
PMV BLD AUTO: 10.5 FL (ref 8.9–12.9)
PMV BLD AUTO: 9.4 FL (ref 8.9–12.9)
PMV BLD AUTO: 9.5 FL (ref 8.9–12.9)
PMV BLD AUTO: 9.5 FL (ref 8.9–12.9)
PMV BLD AUTO: 9.6 FL (ref 8.9–12.9)
PO2 BLDA: 104 MMHG (ref 80–100)
PO2 BLDA: 118 MMHG (ref 80–100)
PO2 BLDA: 127 MMHG (ref 80–100)
PO2 BLDA: 156 MMHG (ref 80–100)
PO2 BLDA: 157 MMHG (ref 80–100)
PO2 BLDA: 158 MMHG (ref 80–100)
PO2 BLDA: 81 MMHG (ref 80–100)
PO2 BLDA: 91 MMHG (ref 80–100)
POTASSIUM SERPL-SCNC: 3.5 MMOL/L (ref 3.5–5.1)
POTASSIUM SERPL-SCNC: 3.8 MMOL/L (ref 3.5–5.1)
POTASSIUM SERPL-SCNC: 4.1 MMOL/L (ref 3.5–5.1)
POTASSIUM SERPL-SCNC: 4.3 MMOL/L (ref 3.5–5.1)
POTASSIUM SERPL-SCNC: 4.3 MMOL/L (ref 3.5–5.1)
POTASSIUM SERPL-SCNC: 4.7 MMOL/L (ref 3.5–5.1)
POTASSIUM SERPL-SCNC: 4.7 MMOL/L (ref 3.5–5.1)
POTASSIUM SERPL-SCNC: 4.8 MMOL/L (ref 3.5–5.1)
POTASSIUM SERPL-SCNC: 5 MMOL/L (ref 3.5–5.1)
POTASSIUM SERPL-SCNC: 5.5 MMOL/L (ref 3.5–5.1)
POTASSIUM SERPL-SCNC: 5.6 MMOL/L (ref 3.5–5.1)
POTASSIUM SERPL-SCNC: 5.8 MMOL/L (ref 3.5–5.1)
PROT SERPL-MCNC: 3.5 G/DL (ref 6.4–8.2)
PROT SERPL-MCNC: 3.6 G/DL (ref 6.4–8.2)
PROT SERPL-MCNC: 3.7 G/DL (ref 6.4–8.2)
PROT SERPL-MCNC: 3.8 G/DL (ref 6.4–8.2)
PROT SERPL-MCNC: 3.8 G/DL (ref 6.4–8.2)
PROT SERPL-MCNC: 4.2 G/DL (ref 6.4–8.2)
PROT SERPL-MCNC: 7 G/DL (ref 6.4–8.2)
PROTHROMBIN TIME: 13 SEC (ref 9–11.1)
PROTHROMBIN TIME: 17.2 SEC (ref 9–11.1)
Q-T INTERVAL, ECG07: 300 MS
Q-T INTERVAL, ECG07: 302 MS
Q-T INTERVAL, ECG07: 320 MS
QRS DURATION, ECG06: 70 MS
QRS DURATION, ECG06: 72 MS
QRS DURATION, ECG06: 74 MS
QTC CALCULATION (BEZET), ECG08: 433 MS
QTC CALCULATION (BEZET), ECG08: 450 MS
QTC CALCULATION (BEZET), ECG08: 458 MS
RBC # BLD AUTO: 2.08 M/UL (ref 3.8–5.2)
RBC # BLD AUTO: 2.72 M/UL (ref 3.8–5.2)
RBC # BLD AUTO: 2.94 M/UL (ref 3.8–5.2)
RBC # BLD AUTO: 3.11 M/UL (ref 3.8–5.2)
RBC # BLD AUTO: 3.55 M/UL (ref 3.8–5.2)
RBC # BLD AUTO: 3.78 M/UL (ref 3.8–5.2)
RBC # BLD AUTO: 3.84 M/UL (ref 3.8–5.2)
RBC # BLD AUTO: 4.18 M/UL (ref 3.8–5.2)
RBC # BLD AUTO: 4.65 M/UL (ref 3.8–5.2)
RBC MORPH BLD: ABNORMAL
RSV RNA SPEC QL NAA+PROBE: NOT DETECTED
RV+EV RNA SPEC QL NAA+PROBE: NOT DETECTED
SAMPLES BEING HELD,HOLD: NORMAL
SAMPLES BEING HELD,HOLD: NORMAL
SAO2 % BLD: 94 % (ref 92–97)
SAO2 % BLD: 95 % (ref 92–97)
SAO2 % BLD: 98 % (ref 92–97)
SAO2 % BLD: 99 % (ref 92–97)
SAO2 % BLD: 99 % (ref 92–97)
SAO2% DEVICE SAO2% SENSOR NAME: ABNORMAL
SERVICE CMNT-IMP: ABNORMAL
SERVICE CMNT-IMP: NORMAL
SODIUM SERPL-SCNC: 129 MMOL/L (ref 136–145)
SODIUM SERPL-SCNC: 130 MMOL/L (ref 136–145)
SODIUM SERPL-SCNC: 131 MMOL/L (ref 136–145)
SODIUM SERPL-SCNC: 132 MMOL/L (ref 136–145)
SODIUM SERPL-SCNC: 133 MMOL/L (ref 136–145)
SODIUM SERPL-SCNC: 133 MMOL/L (ref 136–145)
SODIUM SERPL-SCNC: 135 MMOL/L (ref 136–145)
SODIUM SERPL-SCNC: 137 MMOL/L (ref 136–145)
SPECIMEN SITE: ABNORMAL
STATUS OF UNIT,%ST: NORMAL
T3FREE SERPL-MCNC: 1.6 PG/ML (ref 2.2–4)
T4 FREE SERPL-MCNC: 1.3 NG/DL (ref 0.8–1.5)
THEOPHYLLINE SERPL-MCNC: 18.8 UG/ML (ref 10–20)
THERAPEUTIC RANGE,PTTT: ABNORMAL SECS (ref 58–77)
TROPONIN I SERPL-MCNC: 0.16 NG/ML
TROPONIN I SERPL-MCNC: <0.05 NG/ML
TSH SERPL DL<=0.05 MIU/L-ACNC: 0.24 UIU/ML (ref 0.36–3.74)
UNIT DIVISION, %UDIV: 0
VENTILATION MODE VENT: ABNORMAL
VENTRICULAR RATE, ECG03: 119 BPM
VENTRICULAR RATE, ECG03: 124 BPM
VENTRICULAR RATE, ECG03: 140 BPM
VT SETTING VENT: 400 ML
WBC # BLD AUTO: 10.5 K/UL (ref 3.6–11)
WBC # BLD AUTO: 11.3 K/UL (ref 3.6–11)
WBC # BLD AUTO: 13.3 K/UL (ref 3.6–11)
WBC # BLD AUTO: 19.8 K/UL (ref 3.6–11)
WBC # BLD AUTO: 20.7 K/UL (ref 3.6–11)
WBC # BLD AUTO: 3.6 K/UL (ref 3.6–11)
WBC # BLD AUTO: 7.9 K/UL (ref 3.6–11)
WBC # BLD AUTO: 8.2 K/UL (ref 3.6–11)
WBC # BLD AUTO: 8.4 K/UL (ref 3.6–11)

## 2019-01-01 PROCEDURE — 74011000250 HC RX REV CODE- 250: Performed by: INTERNAL MEDICINE

## 2019-01-01 PROCEDURE — 85025 COMPLETE CBC W/AUTO DIFF WBC: CPT

## 2019-01-01 PROCEDURE — P9016 RBC LEUKOCYTES REDUCED: HCPCS

## 2019-01-01 PROCEDURE — 84100 ASSAY OF PHOSPHORUS: CPT

## 2019-01-01 PROCEDURE — 83605 ASSAY OF LACTIC ACID: CPT

## 2019-01-01 PROCEDURE — 82962 GLUCOSE BLOOD TEST: CPT

## 2019-01-01 PROCEDURE — 65610000006 HC RM INTENSIVE CARE

## 2019-01-01 PROCEDURE — 77010033678 HC OXYGEN DAILY

## 2019-01-01 PROCEDURE — 83010 ASSAY OF HAPTOGLOBIN QUANT: CPT

## 2019-01-01 PROCEDURE — 94640 AIRWAY INHALATION TREATMENT: CPT

## 2019-01-01 PROCEDURE — 83735 ASSAY OF MAGNESIUM: CPT

## 2019-01-01 PROCEDURE — C1752 CATH,HEMODIALYSIS,SHORT-TERM: HCPCS

## 2019-01-01 PROCEDURE — 77030007181 HC COIL EMB TORN COOK -B: Performed by: STUDENT IN AN ORGANIZED HEALTH CARE EDUCATION/TRAINING PROGRAM

## 2019-01-01 PROCEDURE — 36556 INSERT NON-TUNNEL CV CATH: CPT

## 2019-01-01 PROCEDURE — 04L03DZ OCCLUSION OF ABDOMINAL AORTA WITH INTRALUMINAL DEVICE, PERCUTANEOUS APPROACH: ICD-10-PCS | Performed by: STUDENT IN AN ORGANIZED HEALTH CARE EDUCATION/TRAINING PROGRAM

## 2019-01-01 PROCEDURE — 84484 ASSAY OF TROPONIN QUANT: CPT

## 2019-01-01 PROCEDURE — 02HV33Z INSERTION OF INFUSION DEVICE INTO SUPERIOR VENA CAVA, PERCUTANEOUS APPROACH: ICD-10-PCS | Performed by: RADIOLOGY

## 2019-01-01 PROCEDURE — 85049 AUTOMATED PLATELET COUNT: CPT

## 2019-01-01 PROCEDURE — 74011250637 HC RX REV CODE- 250/637: Performed by: FAMILY MEDICINE

## 2019-01-01 PROCEDURE — 74011250636 HC RX REV CODE- 250/636: Performed by: INTERNAL MEDICINE

## 2019-01-01 PROCEDURE — 76937 US GUIDE VASCULAR ACCESS: CPT

## 2019-01-01 PROCEDURE — 74011250636 HC RX REV CODE- 250/636

## 2019-01-01 PROCEDURE — 80053 COMPREHEN METABOLIC PANEL: CPT

## 2019-01-01 PROCEDURE — 74011636320 HC RX REV CODE- 636/320: Performed by: STUDENT IN AN ORGANIZED HEALTH CARE EDUCATION/TRAINING PROGRAM

## 2019-01-01 PROCEDURE — 74011250637 HC RX REV CODE- 250/637: Performed by: INTERNAL MEDICINE

## 2019-01-01 PROCEDURE — C1769 GUIDE WIRE: HCPCS

## 2019-01-01 PROCEDURE — 0BH17EZ INSERTION OF ENDOTRACHEAL AIRWAY INTO TRACHEA, VIA NATURAL OR ARTIFICIAL OPENING: ICD-10-PCS | Performed by: ANESTHESIOLOGY

## 2019-01-01 PROCEDURE — 93005 ELECTROCARDIOGRAM TRACING: CPT

## 2019-01-01 PROCEDURE — 77030007172 HC COIL EMB HLAL COOK -B: Performed by: STUDENT IN AN ORGANIZED HEALTH CARE EDUCATION/TRAINING PROGRAM

## 2019-01-01 PROCEDURE — 74011636637 HC RX REV CODE- 636/637: Performed by: INTERNAL MEDICINE

## 2019-01-01 PROCEDURE — 83036 HEMOGLOBIN GLYCOSYLATED A1C: CPT

## 2019-01-01 PROCEDURE — 74011250636 HC RX REV CODE- 250/636: Performed by: RADIOLOGY

## 2019-01-01 PROCEDURE — 85018 HEMOGLOBIN: CPT

## 2019-01-01 PROCEDURE — 77030018786 HC NDL GD F/USND BARD -B

## 2019-01-01 PROCEDURE — 5A1945Z RESPIRATORY VENTILATION, 24-96 CONSECUTIVE HOURS: ICD-10-PCS | Performed by: ANESTHESIOLOGY

## 2019-01-01 PROCEDURE — 77030013797 HC KT TRNSDUC PRSSR EDWD -A

## 2019-01-01 PROCEDURE — C1751 CATH, INF, PER/CENT/MIDLINE: HCPCS

## 2019-01-01 PROCEDURE — 77030019896 HC KT ARTERIAL LN TELE -B

## 2019-01-01 PROCEDURE — C1894 INTRO/SHEATH, NON-LASER: HCPCS

## 2019-01-01 PROCEDURE — 71045 X-RAY EXAM CHEST 1 VIEW: CPT

## 2019-01-01 PROCEDURE — 94002 VENT MGMT INPAT INIT DAY: CPT

## 2019-01-01 PROCEDURE — 36415 COLL VENOUS BLD VENIPUNCTURE: CPT

## 2019-01-01 PROCEDURE — 82803 BLOOD GASES ANY COMBINATION: CPT

## 2019-01-01 PROCEDURE — 94003 VENT MGMT INPAT SUBQ DAY: CPT

## 2019-01-01 PROCEDURE — 77030004561 HC CATH ANGI DX COBRA ANGI -B

## 2019-01-01 PROCEDURE — 96374 THER/PROPH/DIAG INJ IV PUSH: CPT

## 2019-01-01 PROCEDURE — 74011000258 HC RX REV CODE- 258: Performed by: INTERNAL MEDICINE

## 2019-01-01 PROCEDURE — P9012 CRYOPRECIPITATE EACH UNIT: HCPCS

## 2019-01-01 PROCEDURE — 77030002916 HC SUT ETHLN J&J -A

## 2019-01-01 PROCEDURE — 84439 ASSAY OF FREE THYROXINE: CPT

## 2019-01-01 PROCEDURE — 80048 BASIC METABOLIC PNL TOTAL CA: CPT

## 2019-01-01 PROCEDURE — P9047 ALBUMIN (HUMAN), 25%, 50ML: HCPCS | Performed by: INTERNAL MEDICINE

## 2019-01-01 PROCEDURE — 37244 VASC EMBOLIZE/OCCLUDE BLEED: CPT

## 2019-01-01 PROCEDURE — 30233M1 TRANSFUSION OF NONAUTOLOGOUS PLASMA CRYOPRECIPITATE INTO PERIPHERAL VEIN, PERCUTANEOUS APPROACH: ICD-10-PCS | Performed by: INTERNAL MEDICINE

## 2019-01-01 PROCEDURE — 65660000000 HC RM CCU STEPDOWN

## 2019-01-01 PROCEDURE — 77030019698 HC SYR ANGI MDLON MRTM -A

## 2019-01-01 PROCEDURE — 36600 WITHDRAWAL OF ARTERIAL BLOOD: CPT

## 2019-01-01 PROCEDURE — 77030018729 HC ELECTRD DEFIB PAD CARD -B

## 2019-01-01 PROCEDURE — 77030007181 HC COIL EMB TORN COOK -B

## 2019-01-01 PROCEDURE — C1887 CATHETER, GUIDING: HCPCS

## 2019-01-01 PROCEDURE — 80198 ASSAY OF THEOPHYLLINE: CPT

## 2019-01-01 PROCEDURE — 85027 COMPLETE CBC AUTOMATED: CPT

## 2019-01-01 PROCEDURE — 85384 FIBRINOGEN ACTIVITY: CPT

## 2019-01-01 PROCEDURE — 86920 COMPATIBILITY TEST SPIN: CPT

## 2019-01-01 PROCEDURE — 84132 ASSAY OF SERUM POTASSIUM: CPT

## 2019-01-01 PROCEDURE — 30233K1 TRANSFUSION OF NONAUTOLOGOUS FROZEN PLASMA INTO PERIPHERAL VEIN, PERCUTANEOUS APPROACH: ICD-10-PCS | Performed by: INTERNAL MEDICINE

## 2019-01-01 PROCEDURE — 74011000250 HC RX REV CODE- 250: Performed by: STUDENT IN AN ORGANIZED HEALTH CARE EDUCATION/TRAINING PROGRAM

## 2019-01-01 PROCEDURE — 83880 ASSAY OF NATRIURETIC PEPTIDE: CPT

## 2019-01-01 PROCEDURE — 84481 FREE ASSAY (FT-3): CPT

## 2019-01-01 PROCEDURE — 0099U RESPIRATORY PANEL,PCR,NASOPHARYNGEAL: CPT

## 2019-01-01 PROCEDURE — 77030002996 HC SUT SLK J&J -A

## 2019-01-01 PROCEDURE — 77030004530 HC CATH ANGI DX IMGR BSC -A

## 2019-01-01 PROCEDURE — 71046 X-RAY EXAM CHEST 2 VIEWS: CPT

## 2019-01-01 PROCEDURE — 74011250636 HC RX REV CODE- 250/636: Performed by: EMERGENCY MEDICINE

## 2019-01-01 PROCEDURE — 74011250637 HC RX REV CODE- 250/637: Performed by: NURSE PRACTITIONER

## 2019-01-01 PROCEDURE — 71275 CT ANGIOGRAPHY CHEST: CPT

## 2019-01-01 PROCEDURE — 30233N1 TRANSFUSION OF NONAUTOLOGOUS RED BLOOD CELLS INTO PERIPHERAL VEIN, PERCUTANEOUS APPROACH: ICD-10-PCS | Performed by: INTERNAL MEDICINE

## 2019-01-01 PROCEDURE — 77030038269 HC DRN EXT URIN PURWCK BARD -A

## 2019-01-01 PROCEDURE — P9059 PLASMA, FRZ BETWEEN 8-24HOUR: HCPCS

## 2019-01-01 PROCEDURE — 80076 HEPATIC FUNCTION PANEL: CPT

## 2019-01-01 PROCEDURE — 74011000250 HC RX REV CODE- 250: Performed by: EMERGENCY MEDICINE

## 2019-01-01 PROCEDURE — 36430 TRANSFUSION BLD/BLD COMPNT: CPT

## 2019-01-01 PROCEDURE — 5A09357 ASSISTANCE WITH RESPIRATORY VENTILATION, LESS THAN 24 CONSECUTIVE HOURS, CONTINUOUS POSITIVE AIRWAY PRESSURE: ICD-10-PCS | Performed by: INTERNAL MEDICINE

## 2019-01-01 PROCEDURE — 77030005401 HC CATH RAD ARRO -A

## 2019-01-01 PROCEDURE — 77030037759

## 2019-01-01 PROCEDURE — 84443 ASSAY THYROID STIM HORMONE: CPT

## 2019-01-01 PROCEDURE — 86923 COMPATIBILITY TEST ELECTRIC: CPT

## 2019-01-01 PROCEDURE — 30233R1 TRANSFUSION OF NONAUTOLOGOUS PLATELETS INTO PERIPHERAL VEIN, PERCUTANEOUS APPROACH: ICD-10-PCS | Performed by: INTERNAL MEDICINE

## 2019-01-01 PROCEDURE — 74011000250 HC RX REV CODE- 250: Performed by: ANESTHESIOLOGY

## 2019-01-01 PROCEDURE — 85610 PROTHROMBIN TIME: CPT

## 2019-01-01 PROCEDURE — 83615 LACTATE (LD) (LDH) ENZYME: CPT

## 2019-01-01 PROCEDURE — 74011636320 HC RX REV CODE- 636/320: Performed by: RADIOLOGY

## 2019-01-01 PROCEDURE — 82550 ASSAY OF CK (CPK): CPT

## 2019-01-01 PROCEDURE — 85730 THROMBOPLASTIN TIME PARTIAL: CPT

## 2019-01-01 PROCEDURE — 86900 BLOOD TYPING SEROLOGIC ABO: CPT

## 2019-01-01 PROCEDURE — P9035 PLATELET PHERES LEUKOREDUCED: HCPCS

## 2019-01-01 PROCEDURE — 36573 INSJ PICC RS&I 5 YR+: CPT | Performed by: INTERNAL MEDICINE

## 2019-01-01 PROCEDURE — 77030026918 HC ADMN ST IV BLD BD -A

## 2019-01-01 PROCEDURE — C1760 CLOSURE DEV, VASC: HCPCS

## 2019-01-01 PROCEDURE — 77030029065 HC DRSG HEMO QCLOT ZMED -B

## 2019-01-01 PROCEDURE — 77030007172 HC COIL EMB HLAL COOK -B

## 2019-01-01 PROCEDURE — 71250 CT THORAX DX C-: CPT

## 2019-01-01 PROCEDURE — 77030012879 HC MSK CPAP FLL FAC PHIL -B

## 2019-01-01 PROCEDURE — 99285 EMERGENCY DEPT VISIT HI MDM: CPT

## 2019-01-01 PROCEDURE — 74011250636 HC RX REV CODE- 250/636: Performed by: ANESTHESIOLOGY

## 2019-01-01 PROCEDURE — 74011000250 HC RX REV CODE- 250: Performed by: RADIOLOGY

## 2019-01-01 PROCEDURE — C8929 TTE W OR WO FOL WCON,DOPPLER: HCPCS

## 2019-01-01 PROCEDURE — 74177 CT ABD & PELVIS W/CONTRAST: CPT

## 2019-01-01 PROCEDURE — 94660 CPAP INITIATION&MGMT: CPT

## 2019-01-01 RX ORDER — ALBUTEROL SULFATE 90 UG/1
2 AEROSOL, METERED RESPIRATORY (INHALATION)
COMMUNITY

## 2019-01-01 RX ORDER — ENOXAPARIN SODIUM 100 MG/ML
40 INJECTION SUBCUTANEOUS DAILY
Status: DISCONTINUED | OUTPATIENT
Start: 2019-01-01 | End: 2019-01-01

## 2019-01-01 RX ORDER — SODIUM CHLORIDE 9 MG/ML
500 INJECTION, SOLUTION INTRAVENOUS ONCE
Status: COMPLETED | OUTPATIENT
Start: 2019-01-01 | End: 2019-01-01

## 2019-01-01 RX ORDER — HYDROXYZINE 25 MG/1
25 TABLET, FILM COATED ORAL
Status: DISCONTINUED | OUTPATIENT
Start: 2019-01-01 | End: 2019-01-01

## 2019-01-01 RX ORDER — HYDROCODONE BITARTRATE AND ACETAMINOPHEN 7.5; 325 MG/1; MG/1
1 TABLET ORAL
Status: DISCONTINUED | OUTPATIENT
Start: 2019-01-01 | End: 2019-01-01

## 2019-01-01 RX ORDER — SODIUM CHLORIDE 9 MG/ML
250 INJECTION, SOLUTION INTRAVENOUS AS NEEDED
Status: DISCONTINUED | OUTPATIENT
Start: 2019-01-01 | End: 2019-01-01

## 2019-01-01 RX ORDER — ALBUTEROL SULFATE 0.83 MG/ML
2.5 SOLUTION RESPIRATORY (INHALATION)
Status: DISCONTINUED | OUTPATIENT
Start: 2019-01-01 | End: 2019-01-01

## 2019-01-01 RX ORDER — DEXTROSE 50 % IN WATER (D50W) INTRAVENOUS SYRINGE
25
Status: COMPLETED | OUTPATIENT
Start: 2019-01-01 | End: 2019-01-01

## 2019-01-01 RX ORDER — HEPARIN SODIUM 1000 [USP'U]/ML
3000 INJECTION, SOLUTION INTRAVENOUS; SUBCUTANEOUS ONCE
Status: COMPLETED | OUTPATIENT
Start: 2019-01-01 | End: 2019-01-01

## 2019-01-01 RX ORDER — SODIUM CHLORIDE 450 MG/100ML
500 INJECTION, SOLUTION INTRAVENOUS CONTINUOUS
Status: DISCONTINUED | OUTPATIENT
Start: 2019-01-01 | End: 2019-01-01

## 2019-01-01 RX ORDER — LISINOPRIL 5 MG/1
2.5 TABLET ORAL DAILY
Status: DISCONTINUED | OUTPATIENT
Start: 2019-01-01 | End: 2019-01-01

## 2019-01-01 RX ORDER — ENOXAPARIN SODIUM 100 MG/ML
1 INJECTION SUBCUTANEOUS EVERY 12 HOURS
Status: DISCONTINUED | OUTPATIENT
Start: 2019-01-01 | End: 2019-01-01

## 2019-01-01 RX ORDER — MORPHINE SULFATE 4 MG/ML
2 INJECTION INTRAVENOUS ONCE
Status: COMPLETED | OUTPATIENT
Start: 2019-01-01 | End: 2019-01-01

## 2019-01-01 RX ORDER — INSULIN GLARGINE 100 [IU]/ML
10 INJECTION, SOLUTION SUBCUTANEOUS DAILY
Status: DISCONTINUED | OUTPATIENT
Start: 2019-01-01 | End: 2019-01-01

## 2019-01-01 RX ORDER — LEVALBUTEROL INHALATION SOLUTION 1.25 MG/3ML
1.25 SOLUTION RESPIRATORY (INHALATION)
Status: DISCONTINUED | OUTPATIENT
Start: 2019-01-01 | End: 2019-01-01

## 2019-01-01 RX ORDER — FENTANYL CITRATE 50 UG/ML
25-200 INJECTION, SOLUTION INTRAMUSCULAR; INTRAVENOUS
Status: DISCONTINUED | OUTPATIENT
Start: 2019-01-01 | End: 2019-01-01

## 2019-01-01 RX ORDER — INSULIN GLARGINE 100 [IU]/ML
14-16 INJECTION, SOLUTION SUBCUTANEOUS DAILY
COMMUNITY

## 2019-01-01 RX ORDER — ALBUTEROL SULFATE 0.83 MG/ML
2.5 SOLUTION RESPIRATORY (INHALATION)
Status: DISCONTINUED | OUTPATIENT
Start: 2019-01-01 | End: 2019-10-26 | Stop reason: HOSPADM

## 2019-01-01 RX ORDER — ONDANSETRON 2 MG/ML
4 INJECTION INTRAMUSCULAR; INTRAVENOUS
Status: DISCONTINUED | OUTPATIENT
Start: 2019-01-01 | End: 2019-10-26 | Stop reason: HOSPADM

## 2019-01-01 RX ORDER — HYDROCODONE BITARTRATE AND ACETAMINOPHEN 7.5; 325 MG/1; MG/1
1 TABLET ORAL
COMMUNITY

## 2019-01-01 RX ORDER — ALBUMIN HUMAN 50 G/1000ML
25 SOLUTION INTRAVENOUS EVERY 6 HOURS
Status: DISCONTINUED | OUTPATIENT
Start: 2019-01-01 | End: 2019-01-01

## 2019-01-01 RX ORDER — BUDESONIDE 0.5 MG/2ML
500 INHALANT ORAL
Status: DISCONTINUED | OUTPATIENT
Start: 2019-01-01 | End: 2019-01-01

## 2019-01-01 RX ORDER — CALCIUM CARBONATE 200(500)MG
1 TABLET,CHEWABLE ORAL
COMMUNITY

## 2019-01-01 RX ORDER — IBUPROFEN 800 MG/1
800 TABLET ORAL
COMMUNITY

## 2019-01-01 RX ORDER — PREDNISONE 5 MG/1
5 TABLET ORAL 2 TIMES DAILY
COMMUNITY
Start: 2019-01-01 | End: 2019-01-01

## 2019-01-01 RX ORDER — MIDAZOLAM HYDROCHLORIDE 1 MG/ML
.5-1 INJECTION, SOLUTION INTRAMUSCULAR; INTRAVENOUS
Status: DISCONTINUED | OUTPATIENT
Start: 2019-01-01 | End: 2019-01-01

## 2019-01-01 RX ORDER — IPRATROPIUM BROMIDE AND ALBUTEROL SULFATE 2.5; .5 MG/3ML; MG/3ML
3 SOLUTION RESPIRATORY (INHALATION)
Status: COMPLETED | OUTPATIENT
Start: 2019-01-01 | End: 2019-01-01

## 2019-01-01 RX ORDER — SODIUM CHLORIDE 9 MG/ML
500 INJECTION, SOLUTION INTRAVENOUS CONTINUOUS
Status: DISCONTINUED | OUTPATIENT
Start: 2019-01-01 | End: 2019-01-01

## 2019-01-01 RX ORDER — RANITIDINE 300 MG/1
300 TABLET ORAL DAILY
COMMUNITY

## 2019-01-01 RX ORDER — DOXYCYCLINE HYCLATE 100 MG
100 TABLET ORAL 2 TIMES DAILY WITH MEALS
Status: DISCONTINUED | OUTPATIENT
Start: 2019-01-01 | End: 2019-01-01

## 2019-01-01 RX ORDER — HYDROCORTISONE 1 %
CREAM (GRAM) TOPICAL AS NEEDED
COMMUNITY

## 2019-01-01 RX ORDER — ALBUTEROL SULFATE 0.83 MG/ML
2.5 SOLUTION RESPIRATORY (INHALATION)
COMMUNITY

## 2019-01-01 RX ORDER — MAGNESIUM SULFATE 100 %
4 CRYSTALS MISCELLANEOUS AS NEEDED
Status: DISCONTINUED | OUTPATIENT
Start: 2019-01-01 | End: 2019-01-01 | Stop reason: SDUPTHER

## 2019-01-01 RX ORDER — DEXTROSE MONOHYDRATE 100 MG/ML
0-250 INJECTION, SOLUTION INTRAVENOUS AS NEEDED
Status: DISCONTINUED | OUTPATIENT
Start: 2019-01-01 | End: 2019-01-01

## 2019-01-01 RX ORDER — MAGNESIUM SULFATE 100 %
4 CRYSTALS MISCELLANEOUS AS NEEDED
Status: DISCONTINUED | OUTPATIENT
Start: 2019-01-01 | End: 2019-01-01

## 2019-01-01 RX ORDER — INSULIN LISPRO 100 [IU]/ML
INJECTION, SOLUTION INTRAVENOUS; SUBCUTANEOUS
Status: DISCONTINUED | OUTPATIENT
Start: 2019-01-01 | End: 2019-01-01

## 2019-01-01 RX ORDER — ENOXAPARIN SODIUM 100 MG/ML
40 INJECTION SUBCUTANEOUS EVERY 24 HOURS
Status: DISCONTINUED | OUTPATIENT
Start: 2019-01-01 | End: 2019-01-01

## 2019-01-01 RX ORDER — SODIUM CHLORIDE 0.9 % (FLUSH) 0.9 %
5-40 SYRINGE (ML) INJECTION AS NEEDED
Status: DISCONTINUED | OUTPATIENT
Start: 2019-01-01 | End: 2019-01-01

## 2019-01-01 RX ORDER — SODIUM CHLORIDE 9 MG/ML
1000 INJECTION, SOLUTION INTRAVENOUS ONCE
Status: COMPLETED | OUTPATIENT
Start: 2019-01-01 | End: 2019-01-01

## 2019-01-01 RX ORDER — MORPHINE SULFATE 4 MG/ML
INJECTION INTRAVENOUS
Status: COMPLETED
Start: 2019-01-01 | End: 2019-01-01

## 2019-01-01 RX ORDER — MAGNESIUM SULFATE HEPTAHYDRATE 40 MG/ML
2 INJECTION, SOLUTION INTRAVENOUS ONCE
Status: COMPLETED | OUTPATIENT
Start: 2019-01-01 | End: 2019-01-01

## 2019-01-01 RX ORDER — ALPRAZOLAM 0.25 MG/1
0.25 TABLET ORAL
Status: COMPLETED | OUTPATIENT
Start: 2019-01-01 | End: 2019-01-01

## 2019-01-01 RX ORDER — METOPROLOL TARTRATE 5 MG/5ML
2.5 INJECTION INTRAVENOUS EVERY 6 HOURS
Status: DISCONTINUED | OUTPATIENT
Start: 2019-01-01 | End: 2019-01-01

## 2019-01-01 RX ORDER — INSULIN GLARGINE 100 [IU]/ML
15 INJECTION, SOLUTION SUBCUTANEOUS DAILY
Status: DISCONTINUED | OUTPATIENT
Start: 2019-01-01 | End: 2019-01-01

## 2019-01-01 RX ORDER — DIPHENHYDRAMINE HYDROCHLORIDE 50 MG/ML
25-50 INJECTION, SOLUTION INTRAMUSCULAR; INTRAVENOUS ONCE
Status: DISCONTINUED | OUTPATIENT
Start: 2019-01-01 | End: 2019-01-01 | Stop reason: HOSPADM

## 2019-01-01 RX ORDER — SODIUM CHLORIDE 9 MG/ML
100 INJECTION, SOLUTION INTRAVENOUS CONTINUOUS
Status: DISCONTINUED | OUTPATIENT
Start: 2019-01-01 | End: 2019-01-01

## 2019-01-01 RX ORDER — LORAZEPAM 2 MG/ML
1 INJECTION INTRAMUSCULAR
Status: DISCONTINUED | OUTPATIENT
Start: 2019-01-01 | End: 2019-10-26 | Stop reason: HOSPADM

## 2019-01-01 RX ORDER — DEXTROSE MONOHYDRATE 50 MG/ML
75 INJECTION, SOLUTION INTRAVENOUS CONTINUOUS
Status: DISCONTINUED | OUTPATIENT
Start: 2019-01-01 | End: 2019-01-01

## 2019-01-01 RX ORDER — SODIUM BICARBONATE 84 MG/ML
50 INJECTION, SOLUTION INTRAVENOUS ONCE
Status: COMPLETED | OUTPATIENT
Start: 2019-01-01 | End: 2019-01-01

## 2019-01-01 RX ORDER — FENTANYL CITRATE 50 UG/ML
25 INJECTION, SOLUTION INTRAMUSCULAR; INTRAVENOUS
Status: DISCONTINUED | OUTPATIENT
Start: 2019-01-01 | End: 2019-10-26 | Stop reason: HOSPADM

## 2019-01-01 RX ORDER — ACETAMINOPHEN 650 MG/1
650 SUPPOSITORY RECTAL
Status: DISCONTINUED | OUTPATIENT
Start: 2019-01-01 | End: 2019-10-26 | Stop reason: HOSPADM

## 2019-01-01 RX ORDER — ACETAMINOPHEN 325 MG/1
650 TABLET ORAL
Status: DISCONTINUED | OUTPATIENT
Start: 2019-01-01 | End: 2019-01-01

## 2019-01-01 RX ORDER — ZOLPIDEM TARTRATE 10 MG/1
10 TABLET ORAL
COMMUNITY

## 2019-01-01 RX ORDER — IPRATROPIUM BROMIDE AND ALBUTEROL SULFATE 2.5; .5 MG/3ML; MG/3ML
3 SOLUTION RESPIRATORY (INHALATION)
Status: DISCONTINUED | OUTPATIENT
Start: 2019-01-01 | End: 2019-01-01

## 2019-01-01 RX ORDER — ALPRAZOLAM 0.25 MG/1
0.25 TABLET ORAL
Status: DISCONTINUED | OUTPATIENT
Start: 2019-01-01 | End: 2019-01-01

## 2019-01-01 RX ORDER — FLUTICASONE PROPIONATE AND SALMETEROL 500; 50 UG/1; UG/1
1 POWDER RESPIRATORY (INHALATION) 2 TIMES DAILY
COMMUNITY

## 2019-01-01 RX ORDER — ROSUVASTATIN CALCIUM 10 MG/1
20 TABLET, COATED ORAL DAILY
Status: DISCONTINUED | OUTPATIENT
Start: 2019-01-01 | End: 2019-01-01

## 2019-01-01 RX ORDER — INSULIN LISPRO 100 [IU]/ML
INJECTION, SOLUTION INTRAVENOUS; SUBCUTANEOUS EVERY 6 HOURS
Status: DISCONTINUED | OUTPATIENT
Start: 2019-01-01 | End: 2019-01-01

## 2019-01-01 RX ORDER — IPRATROPIUM BROMIDE 0.5 MG/2.5ML
0.5 SOLUTION RESPIRATORY (INHALATION)
Status: DISCONTINUED | OUTPATIENT
Start: 2019-01-01 | End: 2019-01-01

## 2019-01-01 RX ORDER — ACETAMINOPHEN 325 MG/1
650 TABLET ORAL
Status: DISCONTINUED | OUTPATIENT
Start: 2019-01-01 | End: 2019-10-26 | Stop reason: HOSPADM

## 2019-01-01 RX ORDER — PROTAMINE SULFATE 10 MG/ML
35 INJECTION, SOLUTION INTRAVENOUS
Status: COMPLETED | OUTPATIENT
Start: 2019-01-01 | End: 2019-01-01

## 2019-01-01 RX ORDER — ZOLPIDEM TARTRATE 5 MG/1
5 TABLET ORAL
Status: DISCONTINUED | OUTPATIENT
Start: 2019-01-01 | End: 2019-01-01

## 2019-01-01 RX ORDER — METOPROLOL TARTRATE 5 MG/5ML
2.5 INJECTION INTRAVENOUS
Status: DISCONTINUED | OUTPATIENT
Start: 2019-01-01 | End: 2019-01-01

## 2019-01-01 RX ORDER — DEXMEDETOMIDINE HYDROCHLORIDE 4 UG/ML
.2-.7 INJECTION, SOLUTION INTRAVENOUS
Status: DISCONTINUED | OUTPATIENT
Start: 2019-01-01 | End: 2019-01-01

## 2019-01-01 RX ORDER — PROPOFOL 10 MG/ML
INJECTION, EMULSION INTRAVENOUS
Status: DISPENSED
Start: 2019-01-01 | End: 2019-01-01

## 2019-01-01 RX ORDER — FUROSEMIDE 10 MG/ML
60 INJECTION INTRAMUSCULAR; INTRAVENOUS ONCE
Status: COMPLETED | OUTPATIENT
Start: 2019-01-01 | End: 2019-01-01

## 2019-01-01 RX ORDER — ACETAMINOPHEN 500 MG
500 TABLET ORAL
COMMUNITY

## 2019-01-01 RX ORDER — ALBUMIN HUMAN 250 G/1000ML
25 SOLUTION INTRAVENOUS EVERY 6 HOURS
Status: COMPLETED | OUTPATIENT
Start: 2019-01-01 | End: 2019-01-01

## 2019-01-01 RX ORDER — FUROSEMIDE 20 MG/1
20 TABLET ORAL DAILY
Status: DISCONTINUED | OUTPATIENT
Start: 2019-01-01 | End: 2019-01-01

## 2019-01-01 RX ORDER — SUCCINYLCHOLINE CHLORIDE 20 MG/ML
100 INJECTION INTRAMUSCULAR; INTRAVENOUS
Status: COMPLETED | OUTPATIENT
Start: 2019-01-01 | End: 2019-01-01

## 2019-01-01 RX ORDER — ALBUMIN HUMAN 50 G/1000ML
25 SOLUTION INTRAVENOUS ONCE
Status: DISCONTINUED | OUTPATIENT
Start: 2019-01-01 | End: 2019-01-01

## 2019-01-01 RX ORDER — ARFORMOTEROL TARTRATE 15 UG/2ML
15 SOLUTION RESPIRATORY (INHALATION)
Status: DISCONTINUED | OUTPATIENT
Start: 2019-01-01 | End: 2019-01-01

## 2019-01-01 RX ORDER — IBUPROFEN 200 MG
1 TABLET ORAL DAILY
Status: DISCONTINUED | OUTPATIENT
Start: 2019-01-01 | End: 2019-10-26 | Stop reason: HOSPADM

## 2019-01-01 RX ORDER — HYDROMORPHONE HYDROCHLORIDE 1 MG/ML
0.5 INJECTION, SOLUTION INTRAMUSCULAR; INTRAVENOUS; SUBCUTANEOUS ONCE
Status: COMPLETED | OUTPATIENT
Start: 2019-01-01 | End: 2019-01-01

## 2019-01-01 RX ORDER — HYDROMORPHONE HYDROCHLORIDE 1 MG/ML
0.5 INJECTION, SOLUTION INTRAMUSCULAR; INTRAVENOUS; SUBCUTANEOUS
Status: DISCONTINUED | OUTPATIENT
Start: 2019-01-01 | End: 2019-01-01

## 2019-01-01 RX ORDER — FAMOTIDINE 20 MG/1
20 TABLET, FILM COATED ORAL 2 TIMES DAILY
Status: DISCONTINUED | OUTPATIENT
Start: 2019-01-01 | End: 2019-01-01

## 2019-01-01 RX ORDER — TRANEXAMIC ACID 100 MG/ML
1 INJECTION, SOLUTION INTRAVENOUS ONCE
Status: DISCONTINUED | OUTPATIENT
Start: 2019-01-01 | End: 2019-01-01

## 2019-01-01 RX ORDER — IPRATROPIUM BROMIDE 21 UG/1
2 SPRAY, METERED NASAL 3 TIMES DAILY
COMMUNITY

## 2019-01-01 RX ORDER — LORAZEPAM 0.5 MG/1
0.5 TABLET ORAL
Status: DISPENSED | OUTPATIENT
Start: 2019-01-01 | End: 2019-01-01

## 2019-01-01 RX ORDER — LIDOCAINE HYDROCHLORIDE 10 MG/ML
1-5 INJECTION, SOLUTION EPIDURAL; INFILTRATION; INTRACAUDAL; PERINEURAL ONCE
Status: COMPLETED | OUTPATIENT
Start: 2019-01-01 | End: 2019-01-01

## 2019-01-01 RX ORDER — BUDESONIDE 0.5 MG/2ML
INHALANT ORAL
Status: DISPENSED
Start: 2019-01-01 | End: 2019-01-01

## 2019-01-01 RX ORDER — ROSUVASTATIN CALCIUM 20 MG/1
20 TABLET, COATED ORAL DAILY
COMMUNITY

## 2019-01-01 RX ORDER — LIDOCAINE HYDROCHLORIDE 10 MG/ML
20 INJECTION INFILTRATION; PERINEURAL
Status: DISCONTINUED | OUTPATIENT
Start: 2019-01-01 | End: 2019-01-01 | Stop reason: HOSPADM

## 2019-01-01 RX ORDER — SODIUM CHLORIDE 9 MG/ML
250 INJECTION, SOLUTION INTRAVENOUS AS NEEDED
Status: DISCONTINUED | OUTPATIENT
Start: 2019-01-01 | End: 2019-10-26 | Stop reason: HOSPADM

## 2019-01-01 RX ORDER — SODIUM CHLORIDE 0.9 % (FLUSH) 0.9 %
5-40 SYRINGE (ML) INJECTION EVERY 8 HOURS
Status: DISCONTINUED | OUTPATIENT
Start: 2019-01-01 | End: 2019-10-26 | Stop reason: HOSPADM

## 2019-01-01 RX ORDER — GUAIFENESIN 600 MG/1
1200 TABLET, EXTENDED RELEASE ORAL EVERY 12 HOURS
Status: DISCONTINUED | OUTPATIENT
Start: 2019-01-01 | End: 2019-01-01

## 2019-01-01 RX ORDER — METOPROLOL TARTRATE 5 MG/5ML
2.5 INJECTION INTRAVENOUS ONCE
Status: COMPLETED | OUTPATIENT
Start: 2019-01-01 | End: 2019-01-01

## 2019-01-01 RX ORDER — LISINOPRIL 2.5 MG/1
2.5 TABLET ORAL DAILY
COMMUNITY

## 2019-01-01 RX ORDER — MORPHINE SULFATE 4 MG/ML
2 INJECTION INTRAVENOUS
Status: COMPLETED | OUTPATIENT
Start: 2019-01-01 | End: 2019-01-01

## 2019-01-01 RX ORDER — ONDANSETRON 2 MG/ML
4 INJECTION INTRAMUSCULAR; INTRAVENOUS
Status: DISCONTINUED | OUTPATIENT
Start: 2019-01-01 | End: 2019-01-01

## 2019-01-01 RX ORDER — ALBUMIN HUMAN 250 G/1000ML
25 SOLUTION INTRAVENOUS ONCE
Status: COMPLETED | OUTPATIENT
Start: 2019-01-01 | End: 2019-01-01

## 2019-01-01 RX ORDER — HYDROXYZINE 25 MG/1
25 TABLET, FILM COATED ORAL
COMMUNITY

## 2019-01-01 RX ORDER — FAMOTIDINE 20 MG/1
20 TABLET, FILM COATED ORAL DAILY
Status: DISCONTINUED | OUTPATIENT
Start: 2019-01-01 | End: 2019-01-01

## 2019-01-01 RX ORDER — KETOROLAC TROMETHAMINE 30 MG/ML
30 INJECTION, SOLUTION INTRAMUSCULAR; INTRAVENOUS
Status: DISCONTINUED | OUTPATIENT
Start: 2019-01-01 | End: 2019-10-26 | Stop reason: HOSPADM

## 2019-01-01 RX ORDER — ETOMIDATE 2 MG/ML
20 INJECTION INTRAVENOUS ONCE
Status: COMPLETED | OUTPATIENT
Start: 2019-01-01 | End: 2019-01-01

## 2019-01-01 RX ORDER — SORBITOL SOLUTION 70 %
30 SOLUTION, ORAL MISCELLANEOUS DAILY PRN
Status: DISCONTINUED | OUTPATIENT
Start: 2019-01-01 | End: 2019-01-01

## 2019-01-01 RX ORDER — HEPARIN SODIUM 200 [USP'U]/100ML
500 INJECTION, SOLUTION INTRAVENOUS ONCE
Status: DISCONTINUED | OUTPATIENT
Start: 2019-01-01 | End: 2019-01-01

## 2019-01-01 RX ORDER — SCOLOPAMINE TRANSDERMAL SYSTEM 1 MG/1
1 PATCH, EXTENDED RELEASE TRANSDERMAL
Status: DISCONTINUED | OUTPATIENT
Start: 2019-01-01 | End: 2019-10-26 | Stop reason: HOSPADM

## 2019-01-01 RX ORDER — FENTANYL CITRATE 50 UG/ML
25 INJECTION, SOLUTION INTRAMUSCULAR; INTRAVENOUS
Status: DISCONTINUED | OUTPATIENT
Start: 2019-01-01 | End: 2019-01-01

## 2019-01-01 RX ADMIN — METHYLPREDNISOLONE SODIUM SUCCINATE 60 MG: 40 INJECTION, POWDER, FOR SOLUTION INTRAMUSCULAR; INTRAVENOUS at 10:31

## 2019-01-01 RX ADMIN — THEOPHYLLINE ANHYDROUS 200 MG: 200 CAPSULE, EXTENDED RELEASE ORAL at 14:56

## 2019-01-01 RX ADMIN — THEOPHYLLINE ANHYDROUS 200 MG: 200 CAPSULE, EXTENDED RELEASE ORAL at 21:14

## 2019-01-01 RX ADMIN — ARFORMOTEROL TARTRATE 15 MCG: 15 SOLUTION RESPIRATORY (INHALATION) at 19:46

## 2019-01-01 RX ADMIN — FENTANYL CITRATE 25 MCG: 50 INJECTION, SOLUTION INTRAMUSCULAR; INTRAVENOUS at 01:42

## 2019-01-01 RX ADMIN — PHENYLEPHRINE HYDROCHLORIDE 20 MCG/MIN: 10 INJECTION INTRAVENOUS at 14:11

## 2019-01-01 RX ADMIN — HYDROXYZINE HYDROCHLORIDE 25 MG: 25 TABLET, FILM COATED ORAL at 16:50

## 2019-01-01 RX ADMIN — METOPROLOL TARTRATE 2.5 MG: 5 INJECTION INTRAVENOUS at 22:29

## 2019-01-01 RX ADMIN — BUDESONIDE 500 MCG: 0.5 INHALANT RESPIRATORY (INHALATION) at 07:09

## 2019-01-01 RX ADMIN — LEVALBUTEROL HYDROCHLORIDE 1.25 MG: 1.25 SOLUTION RESPIRATORY (INHALATION) at 08:13

## 2019-01-01 RX ADMIN — NOREPINEPHRINE BITARTRATE 16 MCG/MIN: 1 INJECTION, SOLUTION, CONCENTRATE INTRAVENOUS at 11:24

## 2019-01-01 RX ADMIN — ONDANSETRON 4 MG: 2 INJECTION INTRAMUSCULAR; INTRAVENOUS at 11:02

## 2019-01-01 RX ADMIN — BUDESONIDE 500 MCG: 0.5 INHALANT RESPIRATORY (INHALATION) at 21:03

## 2019-01-01 RX ADMIN — VASOPRESSIN 0.04 UNITS/MIN: 20 INJECTION INTRAVENOUS at 00:13

## 2019-01-01 RX ADMIN — VASOPRESSIN 0.04 UNITS/MIN: 20 INJECTION INTRAVENOUS at 01:28

## 2019-01-01 RX ADMIN — SODIUM CHLORIDE 500 ML: 900 INJECTION, SOLUTION INTRAVENOUS at 20:57

## 2019-01-01 RX ADMIN — DEXTROSE MONOHYDRATE 50 ML: 10 INJECTION, SOLUTION INTRAVENOUS at 22:08

## 2019-01-01 RX ADMIN — Medication 10 ML: at 21:14

## 2019-01-01 RX ADMIN — METHYLPREDNISOLONE SODIUM SUCCINATE 60 MG: 40 INJECTION, POWDER, FOR SOLUTION INTRAMUSCULAR; INTRAVENOUS at 21:11

## 2019-01-01 RX ADMIN — PHENYLEPHRINE HYDROCHLORIDE 300 MCG/MIN: 10 INJECTION INTRAVENOUS at 06:52

## 2019-01-01 RX ADMIN — NOREPINEPHRINE BITARTRATE 80 MCG/MIN: 1 INJECTION, SOLUTION, CONCENTRATE INTRAVENOUS at 22:33

## 2019-01-01 RX ADMIN — SODIUM CHLORIDE 100 ML/HR: 900 INJECTION, SOLUTION INTRAVENOUS at 21:55

## 2019-01-01 RX ADMIN — Medication 100 MCG/HR: at 12:49

## 2019-01-01 RX ADMIN — THEOPHYLLINE ANHYDROUS 200 MG: 200 CAPSULE, EXTENDED RELEASE ORAL at 02:58

## 2019-01-01 RX ADMIN — BUDESONIDE 500 MCG: 0.5 INHALANT RESPIRATORY (INHALATION) at 19:52

## 2019-01-01 RX ADMIN — IPRATROPIUM BROMIDE 0.5 MG: 0.5 SOLUTION RESPIRATORY (INHALATION) at 23:30

## 2019-01-01 RX ADMIN — IPRATROPIUM BROMIDE 0.5 MG: 0.5 SOLUTION RESPIRATORY (INHALATION) at 08:25

## 2019-01-01 RX ADMIN — DOXYCYCLINE HYCLATE 100 MG: 100 TABLET, COATED ORAL at 08:23

## 2019-01-01 RX ADMIN — LEVALBUTEROL HYDROCHLORIDE 1.25 MG: 1.25 SOLUTION RESPIRATORY (INHALATION) at 15:20

## 2019-01-01 RX ADMIN — HYDROXYZINE HYDROCHLORIDE 25 MG: 25 TABLET, FILM COATED ORAL at 11:43

## 2019-01-01 RX ADMIN — METHYLPREDNISOLONE SODIUM SUCCINATE 60 MG: 40 INJECTION, POWDER, FOR SOLUTION INTRAMUSCULAR; INTRAVENOUS at 15:59

## 2019-01-01 RX ADMIN — BUDESONIDE 500 MCG: 0.5 INHALANT RESPIRATORY (INHALATION) at 19:45

## 2019-01-01 RX ADMIN — MORPHINE SULFATE 2 MG: 4 INJECTION INTRAVENOUS at 20:55

## 2019-01-01 RX ADMIN — Medication 10 ML: at 21:51

## 2019-01-01 RX ADMIN — METOPROLOL TARTRATE 2.5 MG: 5 INJECTION INTRAVENOUS at 03:45

## 2019-01-01 RX ADMIN — THEOPHYLLINE ANHYDROUS 200 MG: 200 CAPSULE, EXTENDED RELEASE ORAL at 16:50

## 2019-01-01 RX ADMIN — METHYLPREDNISOLONE SODIUM SUCCINATE 60 MG: 40 INJECTION, POWDER, FOR SOLUTION INTRAMUSCULAR; INTRAVENOUS at 10:26

## 2019-01-01 RX ADMIN — NOREPINEPHRINE BITARTRATE 150 MCG/MIN: 1 INJECTION, SOLUTION, CONCENTRATE INTRAVENOUS at 22:13

## 2019-01-01 RX ADMIN — METHYLPREDNISOLONE SODIUM SUCCINATE 60 MG: 40 INJECTION, POWDER, FOR SOLUTION INTRAMUSCULAR; INTRAVENOUS at 03:03

## 2019-01-01 RX ADMIN — SODIUM CHLORIDE 2.6 UNITS/HR: 9 INJECTION, SOLUTION INTRAVENOUS at 00:44

## 2019-01-01 RX ADMIN — LEVALBUTEROL HYDROCHLORIDE 1.25 MG: 1.25 SOLUTION RESPIRATORY (INHALATION) at 15:06

## 2019-01-01 RX ADMIN — ALPRAZOLAM 0.25 MG: 0.25 TABLET ORAL at 08:54

## 2019-01-01 RX ADMIN — NOREPINEPHRINE BITARTRATE 196 MCG/MIN: 1 INJECTION, SOLUTION, CONCENTRATE INTRAVENOUS at 06:25

## 2019-01-01 RX ADMIN — LEVALBUTEROL HYDROCHLORIDE 1.25 MG: 1.25 SOLUTION RESPIRATORY (INHALATION) at 07:09

## 2019-01-01 RX ADMIN — INSULIN LISPRO 3 UNITS: 100 INJECTION, SOLUTION INTRAVENOUS; SUBCUTANEOUS at 06:00

## 2019-01-01 RX ADMIN — ZOLPIDEM TARTRATE 5 MG: 5 TABLET ORAL at 00:09

## 2019-01-01 RX ADMIN — HYDROCODONE BITARTRATE AND ACETAMINOPHEN 1 TABLET: 7.5; 325 TABLET ORAL at 08:54

## 2019-01-01 RX ADMIN — MEROPENEM 500 MG: 500 INJECTION, POWDER, FOR SOLUTION INTRAVENOUS at 03:00

## 2019-01-01 RX ADMIN — MEROPENEM 500 MG: 500 INJECTION, POWDER, FOR SOLUTION INTRAVENOUS at 02:26

## 2019-01-01 RX ADMIN — Medication 10 ML: at 13:23

## 2019-01-01 RX ADMIN — SODIUM CHLORIDE 500 ML: 900 INJECTION, SOLUTION INTRAVENOUS at 09:21

## 2019-01-01 RX ADMIN — METHYLPREDNISOLONE SODIUM SUCCINATE 60 MG: 40 INJECTION, POWDER, FOR SOLUTION INTRAMUSCULAR; INTRAVENOUS at 10:17

## 2019-01-01 RX ADMIN — PHENYLEPHRINE HYDROCHLORIDE 300 MCG/MIN: 10 INJECTION INTRAVENOUS at 04:21

## 2019-01-01 RX ADMIN — ETOMIDATE 20 MG: 2 INJECTION, SOLUTION INTRAVENOUS at 21:45

## 2019-01-01 RX ADMIN — VASOPRESSIN 0.04 UNITS/MIN: 20 INJECTION INTRAVENOUS at 07:48

## 2019-01-01 RX ADMIN — Medication 16 G: at 20:22

## 2019-01-01 RX ADMIN — PERFLUTREN 2 ML: 6.52 INJECTION, SUSPENSION INTRAVENOUS at 03:00

## 2019-01-01 RX ADMIN — DEXTROSE MONOHYDRATE 250 ML: 10 INJECTION, SOLUTION INTRAVENOUS at 23:15

## 2019-01-01 RX ADMIN — IPRATROPIUM BROMIDE 0.5 MG: 0.5 SOLUTION RESPIRATORY (INHALATION) at 23:27

## 2019-01-01 RX ADMIN — SODIUM CHLORIDE 3 UNITS/HR: 9 INJECTION, SOLUTION INTRAVENOUS at 09:48

## 2019-01-01 RX ADMIN — DEXTROSE MONOHYDRATE 50 ML: 10 INJECTION, SOLUTION INTRAVENOUS at 20:50

## 2019-01-01 RX ADMIN — BUDESONIDE 500 MCG: 0.5 INHALANT RESPIRATORY (INHALATION) at 07:21

## 2019-01-01 RX ADMIN — ALBUMIN (HUMAN) 25 G: 0.25 INJECTION, SOLUTION INTRAVENOUS at 06:39

## 2019-01-01 RX ADMIN — LEVALBUTEROL HYDROCHLORIDE 1.25 MG: 1.25 SOLUTION RESPIRATORY (INHALATION) at 19:43

## 2019-01-01 RX ADMIN — DOXYCYCLINE 100 MG: 100 INJECTION, POWDER, LYOPHILIZED, FOR SOLUTION INTRAVENOUS at 17:55

## 2019-01-01 RX ADMIN — NOREPINEPHRINE BITARTRATE 184 MCG/MIN: 1 INJECTION, SOLUTION, CONCENTRATE INTRAVENOUS at 08:55

## 2019-01-01 RX ADMIN — IOPAMIDOL 100 ML: 755 INJECTION, SOLUTION INTRAVENOUS at 10:10

## 2019-01-01 RX ADMIN — SODIUM CHLORIDE 100 ML/HR: 900 INJECTION, SOLUTION INTRAVENOUS at 03:19

## 2019-01-01 RX ADMIN — Medication 10 ML: at 19:34

## 2019-01-01 RX ADMIN — THEOPHYLLINE ANHYDROUS 200 MG: 200 CAPSULE, EXTENDED RELEASE ORAL at 20:05

## 2019-01-01 RX ADMIN — FENTANYL CITRATE 25 MCG: 50 INJECTION, SOLUTION INTRAMUSCULAR; INTRAVENOUS at 20:18

## 2019-01-01 RX ADMIN — SODIUM CHLORIDE 500 ML: 900 INJECTION, SOLUTION INTRAVENOUS at 19:00

## 2019-01-01 RX ADMIN — FUROSEMIDE 60 MG: 10 INJECTION, SOLUTION INTRAMUSCULAR; INTRAVENOUS at 13:36

## 2019-01-01 RX ADMIN — IPRATROPIUM BROMIDE 0.5 MG: 0.5 SOLUTION RESPIRATORY (INHALATION) at 03:58

## 2019-01-01 RX ADMIN — IPRATROPIUM BROMIDE 0.5 MG: 0.5 SOLUTION RESPIRATORY (INHALATION) at 11:27

## 2019-01-01 RX ADMIN — HYDROXYZINE HYDROCHLORIDE 25 MG: 25 TABLET, FILM COATED ORAL at 10:31

## 2019-01-01 RX ADMIN — LEVALBUTEROL HYDROCHLORIDE 1.25 MG: 1.25 SOLUTION RESPIRATORY (INHALATION) at 08:25

## 2019-01-01 RX ADMIN — Medication 10 ML: at 23:07

## 2019-01-01 RX ADMIN — LEVALBUTEROL HYDROCHLORIDE 1.25 MG: 1.25 SOLUTION RESPIRATORY (INHALATION) at 15:17

## 2019-01-01 RX ADMIN — METHYLPREDNISOLONE SODIUM SUCCINATE 60 MG: 40 INJECTION, POWDER, FOR SOLUTION INTRAMUSCULAR; INTRAVENOUS at 03:46

## 2019-01-01 RX ADMIN — LIDOCAINE HYDROCHLORIDE 20 ML: 10 INJECTION, SOLUTION INFILTRATION; PERINEURAL at 13:42

## 2019-01-01 RX ADMIN — ARFORMOTEROL TARTRATE 15 MCG: 15 SOLUTION RESPIRATORY (INHALATION) at 08:25

## 2019-01-01 RX ADMIN — CALCIUM CHLORIDE 1 G: 100 INJECTION INTRAVENOUS; INTRAVENTRICULAR at 02:58

## 2019-01-01 RX ADMIN — NOREPINEPHRINE BITARTRATE 175 MCG/MIN: 1 INJECTION, SOLUTION, CONCENTRATE INTRAVENOUS at 14:45

## 2019-01-01 RX ADMIN — LEVALBUTEROL HYDROCHLORIDE 1.25 MG: 1.25 SOLUTION RESPIRATORY (INHALATION) at 23:30

## 2019-01-01 RX ADMIN — FAMOTIDINE 20 MG: 10 INJECTION, SOLUTION INTRAVENOUS at 08:55

## 2019-01-01 RX ADMIN — LEVALBUTEROL HYDROCHLORIDE 1.25 MG: 1.25 SOLUTION RESPIRATORY (INHALATION) at 23:13

## 2019-01-01 RX ADMIN — IPRATROPIUM BROMIDE 0.5 MG: 0.5 SOLUTION RESPIRATORY (INHALATION) at 16:43

## 2019-01-01 RX ADMIN — CALCIUM GLUCONATE 2 G: 94 INJECTION, SOLUTION INTRAVENOUS at 23:00

## 2019-01-01 RX ADMIN — VASOPRESSIN 0.04 UNITS/MIN: 20 INJECTION INTRAVENOUS at 14:40

## 2019-01-01 RX ADMIN — METHYLPREDNISOLONE SODIUM SUCCINATE 60 MG: 40 INJECTION, POWDER, FOR SOLUTION INTRAMUSCULAR; INTRAVENOUS at 17:17

## 2019-01-01 RX ADMIN — IPRATROPIUM BROMIDE 0.5 MG: 0.5 SOLUTION RESPIRATORY (INHALATION) at 07:37

## 2019-01-01 RX ADMIN — VASOPRESSIN 0.04 UNITS/MIN: 20 INJECTION INTRAVENOUS at 06:39

## 2019-01-01 RX ADMIN — GUAIFENESIN 1200 MG: 600 TABLET, EXTENDED RELEASE ORAL at 20:06

## 2019-01-01 RX ADMIN — THEOPHYLLINE ANHYDROUS 200 MG: 200 CAPSULE, EXTENDED RELEASE ORAL at 20:09

## 2019-01-01 RX ADMIN — IPRATROPIUM BROMIDE 0.5 MG: 0.5 SOLUTION RESPIRATORY (INHALATION) at 00:00

## 2019-01-01 RX ADMIN — IPRATROPIUM BROMIDE 0.5 MG: 0.5 SOLUTION RESPIRATORY (INHALATION) at 07:21

## 2019-01-01 RX ADMIN — IOPAMIDOL 35 ML: 755 INJECTION, SOLUTION INTRAVENOUS at 14:25

## 2019-01-01 RX ADMIN — NOREPINEPHRINE BITARTRATE 165 MCG/MIN: 1 INJECTION, SOLUTION, CONCENTRATE INTRAVENOUS at 14:40

## 2019-01-01 RX ADMIN — METHYLPREDNISOLONE SODIUM SUCCINATE 125 MG: 125 INJECTION, POWDER, FOR SOLUTION INTRAMUSCULAR; INTRAVENOUS at 21:25

## 2019-01-01 RX ADMIN — VASOPRESSIN 0.04 UNITS/MIN: 20 INJECTION INTRAVENOUS at 15:52

## 2019-01-01 RX ADMIN — GUAIFENESIN 1200 MG: 600 TABLET, EXTENDED RELEASE ORAL at 08:24

## 2019-01-01 RX ADMIN — LEVALBUTEROL HYDROCHLORIDE 1.25 MG: 1.25 SOLUTION RESPIRATORY (INHALATION) at 03:33

## 2019-01-01 RX ADMIN — NOREPINEPHRINE BITARTRATE 200 MCG/MIN: 1 INJECTION, SOLUTION, CONCENTRATE INTRAVENOUS at 17:19

## 2019-01-01 RX ADMIN — GUAIFENESIN 1200 MG: 600 TABLET, EXTENDED RELEASE ORAL at 08:52

## 2019-01-01 RX ADMIN — INSULIN LISPRO 10 UNITS: 100 INJECTION, SOLUTION INTRAVENOUS; SUBCUTANEOUS at 20:11

## 2019-01-01 RX ADMIN — ENOXAPARIN SODIUM 40 MG: 40 INJECTION SUBCUTANEOUS at 20:11

## 2019-01-01 RX ADMIN — NOREPINEPHRINE BITARTRATE 200 MCG/MIN: 1 INJECTION, SOLUTION, CONCENTRATE INTRAVENOUS at 12:00

## 2019-01-01 RX ADMIN — CALCIUM CHLORIDE 1 G: 100 INJECTION, SOLUTION INTRAVENOUS at 08:42

## 2019-01-01 RX ADMIN — DESMOPRESSIN ACETATE 20 MCG: 4 SOLUTION INTRAVENOUS at 09:51

## 2019-01-01 RX ADMIN — Medication 10 ML: at 13:03

## 2019-01-01 RX ADMIN — IPRATROPIUM BROMIDE AND ALBUTEROL SULFATE 3 ML: .5; 3 SOLUTION RESPIRATORY (INHALATION) at 16:59

## 2019-01-01 RX ADMIN — Medication 10 ML: at 20:06

## 2019-01-01 RX ADMIN — PHENYLEPHRINE HYDROCHLORIDE 30 MCG/MIN: 10 INJECTION INTRAVENOUS at 09:28

## 2019-01-01 RX ADMIN — METHYLPREDNISOLONE SODIUM SUCCINATE 60 MG: 40 INJECTION, POWDER, FOR SOLUTION INTRAMUSCULAR; INTRAVENOUS at 17:22

## 2019-01-01 RX ADMIN — INSULIN LISPRO 3 UNITS: 100 INJECTION, SOLUTION INTRAVENOUS; SUBCUTANEOUS at 08:56

## 2019-01-01 RX ADMIN — METHYLPREDNISOLONE SODIUM SUCCINATE 60 MG: 40 INJECTION, POWDER, FOR SOLUTION INTRAMUSCULAR; INTRAVENOUS at 17:08

## 2019-01-01 RX ADMIN — ALBUTEROL SULFATE 2.5 MG: 2.5 SOLUTION RESPIRATORY (INHALATION) at 23:47

## 2019-01-01 RX ADMIN — IPRATROPIUM BROMIDE 0.5 MG: 0.5 SOLUTION RESPIRATORY (INHALATION) at 23:13

## 2019-01-01 RX ADMIN — ALBUTEROL SULFATE 2.5 MG: 2.5 SOLUTION RESPIRATORY (INHALATION) at 03:06

## 2019-01-01 RX ADMIN — THEOPHYLLINE ANHYDROUS 200 MG: 200 CAPSULE, EXTENDED RELEASE ORAL at 08:04

## 2019-01-01 RX ADMIN — SODIUM CHLORIDE 500 ML: 900 INJECTION, SOLUTION INTRAVENOUS at 07:30

## 2019-01-01 RX ADMIN — THEOPHYLLINE ANHYDROUS 200 MG: 200 CAPSULE, EXTENDED RELEASE ORAL at 10:55

## 2019-01-01 RX ADMIN — CALCIUM CHLORIDE 1 G: 100 INJECTION INTRAVENOUS; INTRAVENTRICULAR at 13:27

## 2019-01-01 RX ADMIN — DEXTROSE MONOHYDRATE: 5 INJECTION, SOLUTION INTRAVENOUS at 13:22

## 2019-01-01 RX ADMIN — DEXTROSE MONOHYDRATE 250 ML: 10 INJECTION, SOLUTION INTRAVENOUS at 04:04

## 2019-01-01 RX ADMIN — LEVALBUTEROL HYDROCHLORIDE 1.25 MG: 1.25 SOLUTION RESPIRATORY (INHALATION) at 03:57

## 2019-01-01 RX ADMIN — IPRATROPIUM BROMIDE 0.5 MG: 0.5 SOLUTION RESPIRATORY (INHALATION) at 11:39

## 2019-01-01 RX ADMIN — LEVALBUTEROL HYDROCHLORIDE 1.25 MG: 1.25 SOLUTION RESPIRATORY (INHALATION) at 11:39

## 2019-01-01 RX ADMIN — ALBUMIN (HUMAN) 25 G: 0.25 INJECTION, SOLUTION INTRAVENOUS at 17:38

## 2019-01-01 RX ADMIN — INSULIN LISPRO 2 UNITS: 100 INJECTION, SOLUTION INTRAVENOUS; SUBCUTANEOUS at 08:23

## 2019-01-01 RX ADMIN — LEVALBUTEROL HYDROCHLORIDE 1.25 MG: 1.25 SOLUTION RESPIRATORY (INHALATION) at 03:26

## 2019-01-01 RX ADMIN — NOREPINEPHRINE BITARTRATE 200 MCG/MIN: 1 INJECTION, SOLUTION, CONCENTRATE INTRAVENOUS at 11:37

## 2019-01-01 RX ADMIN — Medication 100 MCG/HR: at 20:07

## 2019-01-01 RX ADMIN — DEXTROSE MONOHYDRATE 75 ML: 10 INJECTION, SOLUTION INTRAVENOUS at 22:25

## 2019-01-01 RX ADMIN — IPRATROPIUM BROMIDE 0.5 MG: 0.5 SOLUTION RESPIRATORY (INHALATION) at 15:06

## 2019-01-01 RX ADMIN — NOREPINEPHRINE BITARTRATE 180 MCG/MIN: 1 INJECTION, SOLUTION, CONCENTRATE INTRAVENOUS at 05:48

## 2019-01-01 RX ADMIN — IPRATROPIUM BROMIDE AND ALBUTEROL SULFATE 3 ML: .5; 3 SOLUTION RESPIRATORY (INHALATION) at 19:32

## 2019-01-01 RX ADMIN — Medication 10 ML: at 14:00

## 2019-01-01 RX ADMIN — ROSUVASTATIN CALCIUM 20 MG: 10 TABLET, COATED ORAL at 08:03

## 2019-01-01 RX ADMIN — CALCIUM CHLORIDE 1 G: 100 INJECTION, SOLUTION INTRAVENOUS at 11:23

## 2019-01-01 RX ADMIN — IPRATROPIUM BROMIDE 0.5 MG: 0.5 SOLUTION RESPIRATORY (INHALATION) at 15:17

## 2019-01-01 RX ADMIN — INSULIN LISPRO 7 UNITS: 100 INJECTION, SOLUTION INTRAVENOUS; SUBCUTANEOUS at 08:03

## 2019-01-01 RX ADMIN — FAMOTIDINE 20 MG: 10 INJECTION, SOLUTION INTRAVENOUS at 10:31

## 2019-01-01 RX ADMIN — ALBUMIN (HUMAN) 25 G: 0.25 INJECTION, SOLUTION INTRAVENOUS at 02:34

## 2019-01-01 RX ADMIN — PHENYLEPHRINE HYDROCHLORIDE 300 MCG/MIN: 10 INJECTION INTRAVENOUS at 11:26

## 2019-01-01 RX ADMIN — SODIUM CHLORIDE 1000 ML: 900 INJECTION, SOLUTION INTRAVENOUS at 11:16

## 2019-01-01 RX ADMIN — BUDESONIDE 500 MCG: 0.5 INHALANT RESPIRATORY (INHALATION) at 07:53

## 2019-01-01 RX ADMIN — ZOLPIDEM TARTRATE 5 MG: 5 TABLET ORAL at 22:31

## 2019-01-01 RX ADMIN — FAMOTIDINE 20 MG: 20 TABLET ORAL at 19:32

## 2019-01-01 RX ADMIN — BUDESONIDE 500 MCG: 0.5 INHALANT RESPIRATORY (INHALATION) at 23:13

## 2019-01-01 RX ADMIN — METHYLPREDNISOLONE SODIUM SUCCINATE 60 MG: 40 INJECTION, POWDER, FOR SOLUTION INTRAMUSCULAR; INTRAVENOUS at 23:40

## 2019-01-01 RX ADMIN — GLUCAGON HYDROCHLORIDE 1 MG: 1 INJECTION, POWDER, FOR SOLUTION INTRAMUSCULAR; INTRAVENOUS; SUBCUTANEOUS at 20:29

## 2019-01-01 RX ADMIN — Medication 25 MCG/HR: at 00:30

## 2019-01-01 RX ADMIN — Medication 10 ML: at 06:25

## 2019-01-01 RX ADMIN — SODIUM BICARBONATE 50 MEQ: 84 INJECTION, SOLUTION INTRAVENOUS at 23:34

## 2019-01-01 RX ADMIN — HYDROXYZINE HYDROCHLORIDE 25 MG: 25 TABLET, FILM COATED ORAL at 00:39

## 2019-01-01 RX ADMIN — INSULIN LISPRO 2 UNITS: 100 INJECTION, SOLUTION INTRAVENOUS; SUBCUTANEOUS at 17:17

## 2019-01-01 RX ADMIN — FAMOTIDINE 20 MG: 20 TABLET ORAL at 08:24

## 2019-01-01 RX ADMIN — DEXTROSE MONOHYDRATE: 5 INJECTION, SOLUTION INTRAVENOUS at 23:31

## 2019-01-01 RX ADMIN — MAGNESIUM SULFATE HEPTAHYDRATE 2 G: 40 INJECTION, SOLUTION INTRAVENOUS at 02:39

## 2019-01-01 RX ADMIN — BUDESONIDE 500 MCG: 0.5 INHALANT RESPIRATORY (INHALATION) at 08:13

## 2019-01-01 RX ADMIN — IPRATROPIUM BROMIDE AND ALBUTEROL SULFATE 3 ML: .5; 3 SOLUTION RESPIRATORY (INHALATION) at 15:43

## 2019-01-01 RX ADMIN — THEOPHYLLINE ANHYDROUS 200 MG: 200 CAPSULE, EXTENDED RELEASE ORAL at 17:28

## 2019-01-01 RX ADMIN — METHYLPREDNISOLONE SODIUM SUCCINATE 60 MG: 40 INJECTION, POWDER, FOR SOLUTION INTRAMUSCULAR; INTRAVENOUS at 21:48

## 2019-01-01 RX ADMIN — GUAIFENESIN 1200 MG: 600 TABLET, EXTENDED RELEASE ORAL at 13:20

## 2019-01-01 RX ADMIN — MEROPENEM 500 MG: 500 INJECTION, POWDER, FOR SOLUTION INTRAVENOUS at 19:46

## 2019-01-01 RX ADMIN — DEXTROSE MONOHYDRATE: 5 INJECTION, SOLUTION INTRAVENOUS at 10:44

## 2019-01-01 RX ADMIN — METHYLPREDNISOLONE SODIUM SUCCINATE 60 MG: 40 INJECTION, POWDER, FOR SOLUTION INTRAMUSCULAR; INTRAVENOUS at 22:20

## 2019-01-01 RX ADMIN — HEPARIN SODIUM 2500 UNITS: 1000 INJECTION INTRAVENOUS; SUBCUTANEOUS at 12:24

## 2019-01-01 RX ADMIN — ROSUVASTATIN CALCIUM 20 MG: 10 TABLET, COATED ORAL at 08:23

## 2019-01-01 RX ADMIN — SODIUM CHLORIDE 1000 ML: 900 INJECTION, SOLUTION INTRAVENOUS at 01:45

## 2019-01-01 RX ADMIN — DEXTROSE MONOHYDRATE 25 G: 25 INJECTION, SOLUTION INTRAVENOUS at 04:23

## 2019-01-01 RX ADMIN — ALBUTEROL SULFATE 2.5 MG: 2.5 SOLUTION RESPIRATORY (INHALATION) at 14:19

## 2019-01-01 RX ADMIN — NOREPINEPHRINE BITARTRATE 196 MCG/MIN: 1 INJECTION, SOLUTION, CONCENTRATE INTRAVENOUS at 03:56

## 2019-01-01 RX ADMIN — LEVALBUTEROL HYDROCHLORIDE 1.25 MG: 1.25 SOLUTION RESPIRATORY (INHALATION) at 00:00

## 2019-01-01 RX ADMIN — FAMOTIDINE 20 MG: 20 TABLET ORAL at 16:51

## 2019-01-01 RX ADMIN — INSULIN LISPRO 2 UNITS: 100 INJECTION, SOLUTION INTRAVENOUS; SUBCUTANEOUS at 11:35

## 2019-01-01 RX ADMIN — CALCIUM GLUCONATE 1 G: 94 INJECTION, SOLUTION INTRAVENOUS at 17:11

## 2019-01-01 RX ADMIN — IPRATROPIUM BROMIDE 0.5 MG: 0.5 SOLUTION RESPIRATORY (INHALATION) at 15:20

## 2019-01-01 RX ADMIN — NOREPINEPHRINE BITARTRATE 196 MCG/MIN: 1 INJECTION, SOLUTION, CONCENTRATE INTRAVENOUS at 01:17

## 2019-01-01 RX ADMIN — LEVALBUTEROL HYDROCHLORIDE 1.25 MG: 1.25 SOLUTION RESPIRATORY (INHALATION) at 07:53

## 2019-01-01 RX ADMIN — METHYLPREDNISOLONE SODIUM SUCCINATE 60 MG: 40 INJECTION, POWDER, FOR SOLUTION INTRAMUSCULAR; INTRAVENOUS at 22:01

## 2019-01-01 RX ADMIN — PHENYLEPHRINE HYDROCHLORIDE 300 MCG/MIN: 10 INJECTION INTRAVENOUS at 22:32

## 2019-01-01 RX ADMIN — METHYLPREDNISOLONE SODIUM SUCCINATE 60 MG: 40 INJECTION, POWDER, FOR SOLUTION INTRAMUSCULAR; INTRAVENOUS at 04:17

## 2019-01-01 RX ADMIN — LEVALBUTEROL HYDROCHLORIDE 1.25 MG: 1.25 SOLUTION RESPIRATORY (INHALATION) at 11:11

## 2019-01-01 RX ADMIN — NOREPINEPHRINE BITARTRATE 4 MCG/MIN: 1 INJECTION, SOLUTION, CONCENTRATE INTRAVENOUS at 11:08

## 2019-01-01 RX ADMIN — PHENYLEPHRINE HYDROCHLORIDE 300 MCG/MIN: 10 INJECTION INTRAVENOUS at 01:01

## 2019-01-01 RX ADMIN — TRANEXAMIC ACID 1000 MG: 100 INJECTION, SOLUTION INTRAVENOUS at 11:29

## 2019-01-01 RX ADMIN — METHYLPREDNISOLONE SODIUM SUCCINATE 125 MG: 125 INJECTION, POWDER, FOR SOLUTION INTRAMUSCULAR; INTRAVENOUS at 16:41

## 2019-01-01 RX ADMIN — PHENYLEPHRINE HYDROCHLORIDE 300 MCG/MIN: 10 INJECTION INTRAVENOUS at 14:31

## 2019-01-01 RX ADMIN — IPRATROPIUM BROMIDE 0.5 MG: 0.5 SOLUTION RESPIRATORY (INHALATION) at 03:33

## 2019-01-01 RX ADMIN — DOXYCYCLINE HYCLATE 100 MG: 100 TABLET, COATED ORAL at 07:57

## 2019-01-01 RX ADMIN — INSULIN HUMAN 10 UNITS: 100 INJECTION, SUSPENSION SUBCUTANEOUS at 16:23

## 2019-01-01 RX ADMIN — NOREPINEPHRINE BITARTRATE 196 MCG/MIN: 1 INJECTION, SOLUTION, CONCENTRATE INTRAVENOUS at 19:56

## 2019-01-01 RX ADMIN — Medication 10 ML: at 23:18

## 2019-01-01 RX ADMIN — DEXMEDETOMIDINE HYDROCHLORIDE 0.3 MCG/KG/HR: 400 INJECTION INTRAVENOUS at 23:04

## 2019-01-01 RX ADMIN — IPRATROPIUM BROMIDE 0.5 MG: 0.5 SOLUTION RESPIRATORY (INHALATION) at 19:52

## 2019-01-01 RX ADMIN — ROSUVASTATIN CALCIUM 20 MG: 10 TABLET, COATED ORAL at 08:52

## 2019-01-01 RX ADMIN — Medication 10 ML: at 03:20

## 2019-01-01 RX ADMIN — INSULIN LISPRO 3 UNITS: 100 INJECTION, SOLUTION INTRAVENOUS; SUBCUTANEOUS at 07:56

## 2019-01-01 RX ADMIN — SODIUM BICARBONATE 50 MEQ: 84 INJECTION, SOLUTION INTRAVENOUS at 18:38

## 2019-01-01 RX ADMIN — IPRATROPIUM BROMIDE AND ALBUTEROL SULFATE 3 ML: .5; 3 SOLUTION RESPIRATORY (INHALATION) at 07:15

## 2019-01-01 RX ADMIN — THEOPHYLLINE ANHYDROUS 200 MG: 200 CAPSULE, EXTENDED RELEASE ORAL at 21:15

## 2019-01-01 RX ADMIN — NOREPINEPHRINE BITARTRATE 200 MCG/MIN: 1 INJECTION, SOLUTION, CONCENTRATE INTRAVENOUS at 05:58

## 2019-01-01 RX ADMIN — IPRATROPIUM BROMIDE 0.5 MG: 0.5 SOLUTION RESPIRATORY (INHALATION) at 07:09

## 2019-01-01 RX ADMIN — BUDESONIDE 500 MCG: 0.5 INHALANT RESPIRATORY (INHALATION) at 07:37

## 2019-01-01 RX ADMIN — NOREPINEPHRINE BITARTRATE 200 MCG/MIN: 1 INJECTION, SOLUTION, CONCENTRATE INTRAVENOUS at 03:21

## 2019-01-01 RX ADMIN — DOXYCYCLINE HYCLATE 100 MG: 100 TABLET, COATED ORAL at 19:33

## 2019-01-01 RX ADMIN — INSULIN HUMAN 20 UNITS: 100 INJECTION, SUSPENSION SUBCUTANEOUS at 08:03

## 2019-01-01 RX ADMIN — ALPRAZOLAM 0.25 MG: 0.25 TABLET ORAL at 20:05

## 2019-01-01 RX ADMIN — METHYLPREDNISOLONE SODIUM SUCCINATE 60 MG: 40 INJECTION, POWDER, FOR SOLUTION INTRAMUSCULAR; INTRAVENOUS at 23:35

## 2019-01-01 RX ADMIN — FAMOTIDINE 20 MG: 20 TABLET, FILM COATED ORAL at 08:54

## 2019-01-01 RX ADMIN — NOREPINEPHRINE BITARTRATE 190 MCG/MIN: 1 INJECTION, SOLUTION, CONCENTRATE INTRAVENOUS at 10:41

## 2019-01-01 RX ADMIN — DOXYCYCLINE HYCLATE 100 MG: 100 TABLET, COATED ORAL at 17:22

## 2019-01-01 RX ADMIN — LEVALBUTEROL HYDROCHLORIDE 1.25 MG: 1.25 SOLUTION RESPIRATORY (INHALATION) at 23:27

## 2019-01-01 RX ADMIN — LIDOCAINE HYDROCHLORIDE 5 ML: 10 INJECTION, SOLUTION EPIDURAL; INFILTRATION; INTRACAUDAL; PERINEURAL at 12:14

## 2019-01-01 RX ADMIN — METHYLPREDNISOLONE SODIUM SUCCINATE 60 MG: 40 INJECTION, POWDER, FOR SOLUTION INTRAMUSCULAR; INTRAVENOUS at 10:50

## 2019-01-01 RX ADMIN — VASOPRESSIN 0.04 UNITS/MIN: 20 INJECTION INTRAVENOUS at 09:59

## 2019-01-01 RX ADMIN — LEVALBUTEROL HYDROCHLORIDE 1.25 MG: 1.25 SOLUTION RESPIRATORY (INHALATION) at 23:56

## 2019-01-01 RX ADMIN — SODIUM CHLORIDE 100 ML/HR: 900 INJECTION, SOLUTION INTRAVENOUS at 17:52

## 2019-01-01 RX ADMIN — IPRATROPIUM BROMIDE 0.5 MG: 0.5 SOLUTION RESPIRATORY (INHALATION) at 19:43

## 2019-01-01 RX ADMIN — ENOXAPARIN SODIUM 70 MG: 80 INJECTION SUBCUTANEOUS at 22:30

## 2019-01-01 RX ADMIN — IPRATROPIUM BROMIDE 0.5 MG: 0.5 SOLUTION RESPIRATORY (INHALATION) at 03:26

## 2019-01-01 RX ADMIN — ALBUMIN (HUMAN) 25 G: 0.25 INJECTION, SOLUTION INTRAVENOUS at 23:43

## 2019-01-01 RX ADMIN — DOXYCYCLINE HYCLATE 100 MG: 100 TABLET, COATED ORAL at 17:17

## 2019-01-01 RX ADMIN — ONDANSETRON 4 MG: 2 INJECTION INTRAMUSCULAR; INTRAVENOUS at 15:47

## 2019-01-01 RX ADMIN — LEVALBUTEROL HYDROCHLORIDE 1.25 MG: 1.25 SOLUTION RESPIRATORY (INHALATION) at 07:37

## 2019-01-01 RX ADMIN — IPRATROPIUM BROMIDE AND ALBUTEROL SULFATE 3 ML: .5; 3 SOLUTION RESPIRATORY (INHALATION) at 21:03

## 2019-01-01 RX ADMIN — DEXTROSE MONOHYDRATE 125 ML: 10 INJECTION, SOLUTION INTRAVENOUS at 23:28

## 2019-01-01 RX ADMIN — PHENYLEPHRINE HYDROCHLORIDE 300 MCG/MIN: 10 INJECTION INTRAVENOUS at 07:41

## 2019-01-01 RX ADMIN — LISINOPRIL 2.5 MG: 5 TABLET ORAL at 08:23

## 2019-01-01 RX ADMIN — FENTANYL CITRATE 25 MCG: 50 INJECTION, SOLUTION INTRAMUSCULAR; INTRAVENOUS at 03:25

## 2019-01-01 RX ADMIN — FAMOTIDINE 20 MG: 20 TABLET ORAL at 08:52

## 2019-01-01 RX ADMIN — ACETAMINOPHEN 650 MG: 325 TABLET ORAL at 10:31

## 2019-01-01 RX ADMIN — Medication 10 ML: at 06:45

## 2019-01-01 RX ADMIN — IPRATROPIUM BROMIDE 0.5 MG: 0.5 SOLUTION RESPIRATORY (INHALATION) at 08:13

## 2019-01-01 RX ADMIN — METHYLPREDNISOLONE SODIUM SUCCINATE 60 MG: 40 INJECTION, POWDER, FOR SOLUTION INTRAMUSCULAR; INTRAVENOUS at 04:34

## 2019-01-01 RX ADMIN — FAMOTIDINE 20 MG: 20 TABLET ORAL at 17:17

## 2019-01-01 RX ADMIN — PHENYLEPHRINE HYDROCHLORIDE 300 MCG/MIN: 10 INJECTION INTRAVENOUS at 16:57

## 2019-01-01 RX ADMIN — LEVALBUTEROL HYDROCHLORIDE 1.25 MG: 1.25 SOLUTION RESPIRATORY (INHALATION) at 07:21

## 2019-01-01 RX ADMIN — NOREPINEPHRINE BITARTRATE 196 MCG/MIN: 1 INJECTION, SOLUTION, CONCENTRATE INTRAVENOUS at 22:38

## 2019-01-01 RX ADMIN — PROTAMINE SULFATE 35 MG: 10 INJECTION, SOLUTION INTRAVENOUS at 12:05

## 2019-01-01 RX ADMIN — BUDESONIDE 500 MCG: 0.5 INHALANT RESPIRATORY (INHALATION) at 08:27

## 2019-01-01 RX ADMIN — DOXYCYCLINE HYCLATE 100 MG: 100 TABLET, COATED ORAL at 16:50

## 2019-01-01 RX ADMIN — NOREPINEPHRINE BITARTRATE 200 MCG/MIN: 1 INJECTION, SOLUTION, CONCENTRATE INTRAVENOUS at 00:49

## 2019-01-01 RX ADMIN — INSULIN HUMAN 20 UNITS: 100 INJECTION, SUSPENSION SUBCUTANEOUS at 08:23

## 2019-01-01 RX ADMIN — HYDROXYZINE HYDROCHLORIDE 25 MG: 25 TABLET, FILM COATED ORAL at 20:10

## 2019-01-01 RX ADMIN — PHENYLEPHRINE HYDROCHLORIDE 300 MCG/MIN: 10 INJECTION INTRAVENOUS at 21:13

## 2019-01-01 RX ADMIN — IOPAMIDOL 70 ML: 755 INJECTION, SOLUTION INTRAVENOUS at 16:15

## 2019-01-01 RX ADMIN — IPRATROPIUM BROMIDE AND ALBUTEROL SULFATE 3 ML: .5; 3 SOLUTION RESPIRATORY (INHALATION) at 12:46

## 2019-01-01 RX ADMIN — METHYLPREDNISOLONE SODIUM SUCCINATE 60 MG: 40 INJECTION, POWDER, FOR SOLUTION INTRAMUSCULAR; INTRAVENOUS at 04:54

## 2019-01-01 RX ADMIN — BUDESONIDE 500 MCG: 0.5 INHALANT RESPIRATORY (INHALATION) at 20:31

## 2019-01-01 RX ADMIN — LEVALBUTEROL HYDROCHLORIDE 1.25 MG: 1.25 SOLUTION RESPIRATORY (INHALATION) at 23:17

## 2019-01-01 RX ADMIN — LEVALBUTEROL HYDROCHLORIDE 1.25 MG: 1.25 SOLUTION RESPIRATORY (INHALATION) at 16:43

## 2019-01-01 RX ADMIN — PHENYLEPHRINE HYDROCHLORIDE 300 MCG/MIN: 10 INJECTION INTRAVENOUS at 13:37

## 2019-01-01 RX ADMIN — BUDESONIDE 500 MCG: 0.5 INHALANT RESPIRATORY (INHALATION) at 19:32

## 2019-01-01 RX ADMIN — PHENYLEPHRINE HYDROCHLORIDE 190 MCG/MIN: 10 INJECTION INTRAVENOUS at 17:33

## 2019-01-01 RX ADMIN — ZOLPIDEM TARTRATE 5 MG: 5 TABLET ORAL at 22:23

## 2019-01-01 RX ADMIN — METHYLPREDNISOLONE SODIUM SUCCINATE 60 MG: 40 INJECTION, POWDER, FOR SOLUTION INTRAMUSCULAR; INTRAVENOUS at 10:54

## 2019-01-01 RX ADMIN — HYDROMORPHONE HYDROCHLORIDE 0.5 MG: 1 INJECTION, SOLUTION INTRAMUSCULAR; INTRAVENOUS; SUBCUTANEOUS at 10:58

## 2019-01-01 RX ADMIN — SODIUM CHLORIDE 500 ML: 450 INJECTION, SOLUTION INTRAVENOUS at 16:05

## 2019-01-01 RX ADMIN — INSULIN HUMAN 20 UNITS: 100 INJECTION, SUSPENSION SUBCUTANEOUS at 17:17

## 2019-01-01 RX ADMIN — ALBUMIN (HUMAN) 25 G: 0.25 INJECTION, SOLUTION INTRAVENOUS at 13:02

## 2019-01-01 RX ADMIN — NOREPINEPHRINE BITARTRATE 35 MCG/MIN: 1 INJECTION, SOLUTION, CONCENTRATE INTRAVENOUS at 16:34

## 2019-01-01 RX ADMIN — INSULIN LISPRO 3 UNITS: 100 INJECTION, SOLUTION INTRAVENOUS; SUBCUTANEOUS at 18:17

## 2019-01-01 RX ADMIN — IPRATROPIUM BROMIDE 0.5 MG: 0.5 SOLUTION RESPIRATORY (INHALATION) at 15:30

## 2019-01-01 RX ADMIN — NOREPINEPHRINE BITARTRATE 168 MCG/MIN: 1 INJECTION, SOLUTION, CONCENTRATE INTRAVENOUS at 11:51

## 2019-01-01 RX ADMIN — INSULIN LISPRO 5 UNITS: 100 INJECTION, SOLUTION INTRAVENOUS; SUBCUTANEOUS at 16:23

## 2019-01-01 RX ADMIN — INSULIN LISPRO 2 UNITS: 100 INJECTION, SOLUTION INTRAVENOUS; SUBCUTANEOUS at 00:09

## 2019-01-01 RX ADMIN — DEXTROSE MONOHYDRATE: 5 INJECTION, SOLUTION INTRAVENOUS at 20:54

## 2019-01-01 RX ADMIN — VASOPRESSIN 0.04 UNITS/MIN: 20 INJECTION INTRAVENOUS at 23:28

## 2019-01-01 RX ADMIN — DEXTROSE MONOHYDRATE: 5 INJECTION, SOLUTION INTRAVENOUS at 05:54

## 2019-01-01 RX ADMIN — NOREPINEPHRINE BITARTRATE 190 MCG/MIN: 1 INJECTION, SOLUTION, CONCENTRATE INTRAVENOUS at 08:31

## 2019-01-01 RX ADMIN — CALCIUM CHLORIDE 1 G: 100 INJECTION, SOLUTION INTRAVENOUS at 11:39

## 2019-01-01 RX ADMIN — INSULIN HUMAN 25 UNITS: 100 INJECTION, SUSPENSION SUBCUTANEOUS at 07:57

## 2019-01-01 RX ADMIN — NOREPINEPHRINE BITARTRATE 200 MCG/MIN: 1 INJECTION, SOLUTION, CONCENTRATE INTRAVENOUS at 08:53

## 2019-01-01 RX ADMIN — LEVALBUTEROL HYDROCHLORIDE 1.25 MG: 1.25 SOLUTION RESPIRATORY (INHALATION) at 03:50

## 2019-01-01 RX ADMIN — VASOPRESSIN 0.04 UNITS/MIN: 20 INJECTION INTRAVENOUS at 18:31

## 2019-01-01 RX ADMIN — DEXMEDETOMIDINE HYDROCHLORIDE 0.2 MCG/KG/HR: 400 INJECTION INTRAVENOUS at 02:44

## 2019-01-01 RX ADMIN — DOXYCYCLINE HYCLATE 100 MG: 100 TABLET, COATED ORAL at 08:54

## 2019-01-01 RX ADMIN — BUDESONIDE 500 MCG: 0.5 INHALANT RESPIRATORY (INHALATION) at 07:15

## 2019-01-01 RX ADMIN — ENOXAPARIN SODIUM 70 MG: 80 INJECTION SUBCUTANEOUS at 23:48

## 2019-01-01 RX ADMIN — ENOXAPARIN SODIUM 40 MG: 40 INJECTION SUBCUTANEOUS at 21:14

## 2019-01-01 RX ADMIN — Medication 100 MCG/HR: at 04:12

## 2019-01-01 RX ADMIN — NOREPINEPHRINE BITARTRATE 10 MCG/MIN: 1 INJECTION, SOLUTION, CONCENTRATE INTRAVENOUS at 14:21

## 2019-01-01 RX ADMIN — MEROPENEM 500 MG: 500 INJECTION, POWDER, FOR SOLUTION INTRAVENOUS at 18:47

## 2019-01-01 RX ADMIN — Medication 10 ML: at 13:14

## 2019-01-01 RX ADMIN — MORPHINE SULFATE 2 MG: 4 INJECTION INTRAVENOUS at 20:50

## 2019-01-01 RX ADMIN — THEOPHYLLINE ANHYDROUS 200 MG: 200 CAPSULE, EXTENDED RELEASE ORAL at 08:33

## 2019-01-01 RX ADMIN — ALPRAZOLAM 0.25 MG: 0.25 TABLET ORAL at 04:22

## 2019-01-01 RX ADMIN — IPRATROPIUM BROMIDE 0.5 MG: 0.5 SOLUTION RESPIRATORY (INHALATION) at 11:11

## 2019-01-01 RX ADMIN — PHENYLEPHRINE HYDROCHLORIDE 220 MCG/MIN: 10 INJECTION INTRAVENOUS at 20:05

## 2019-01-01 RX ADMIN — FUROSEMIDE 20 MG: 20 TABLET ORAL at 08:24

## 2019-01-01 RX ADMIN — Medication 10 ML: at 05:27

## 2019-01-01 RX ADMIN — ALBUTEROL SULFATE 2.5 MG: 2.5 SOLUTION RESPIRATORY (INHALATION) at 10:08

## 2019-01-01 RX ADMIN — MEROPENEM 500 MG: 500 INJECTION, POWDER, FOR SOLUTION INTRAVENOUS at 14:53

## 2019-01-01 RX ADMIN — BUDESONIDE 500 MCG: 0.5 INHALANT RESPIRATORY (INHALATION) at 19:43

## 2019-01-01 RX ADMIN — IPRATROPIUM BROMIDE 0.5 MG: 0.5 SOLUTION RESPIRATORY (INHALATION) at 20:31

## 2019-01-01 RX ADMIN — MORPHINE SULFATE 2 MG: 4 INJECTION INTRAVENOUS at 07:01

## 2019-01-01 RX ADMIN — METHYLPREDNISOLONE SODIUM SUCCINATE 60 MG: 40 INJECTION, POWDER, FOR SOLUTION INTRAMUSCULAR; INTRAVENOUS at 03:17

## 2019-01-01 RX ADMIN — ALPRAZOLAM 0.25 MG: 0.25 TABLET ORAL at 19:45

## 2019-01-01 RX ADMIN — DOXYCYCLINE HYCLATE 100 MG: 100 TABLET, COATED ORAL at 08:03

## 2019-01-01 RX ADMIN — DEXTROSE MONOHYDRATE 50 ML/HR: 5 INJECTION, SOLUTION INTRAVENOUS at 02:18

## 2019-01-01 RX ADMIN — IPRATROPIUM BROMIDE 0.5 MG: 0.5 SOLUTION RESPIRATORY (INHALATION) at 03:16

## 2019-01-01 RX ADMIN — IPRATROPIUM BROMIDE 0.5 MG: 0.5 SOLUTION RESPIRATORY (INHALATION) at 07:53

## 2019-01-01 RX ADMIN — SUCCINYLCHOLINE CHLORIDE 100 MG: 20 INJECTION, SOLUTION INTRAMUSCULAR; INTRAVENOUS; PARENTERAL at 21:45

## 2019-01-01 RX ADMIN — METHYLPREDNISOLONE SODIUM SUCCINATE 60 MG: 40 INJECTION, POWDER, FOR SOLUTION INTRAMUSCULAR; INTRAVENOUS at 15:39

## 2019-01-01 RX ADMIN — METHYLPREDNISOLONE SODIUM SUCCINATE 60 MG: 40 INJECTION, POWDER, FOR SOLUTION INTRAMUSCULAR; INTRAVENOUS at 11:34

## 2019-01-01 RX ADMIN — THEOPHYLLINE ANHYDROUS 200 MG: 200 CAPSULE, EXTENDED RELEASE ORAL at 02:19

## 2019-01-01 RX ADMIN — THEOPHYLLINE ANHYDROUS 200 MG: 200 CAPSULE, EXTENDED RELEASE ORAL at 03:14

## 2019-01-01 RX ADMIN — FAMOTIDINE 20 MG: 10 INJECTION, SOLUTION INTRAVENOUS at 10:17

## 2019-01-01 RX ADMIN — LEVALBUTEROL HYDROCHLORIDE 1.25 MG: 1.25 SOLUTION RESPIRATORY (INHALATION) at 03:16

## 2019-01-01 RX ADMIN — ARFORMOTEROL TARTRATE 15 MCG: 15 SOLUTION RESPIRATORY (INHALATION) at 08:13

## 2019-01-01 RX ADMIN — LEVALBUTEROL HYDROCHLORIDE 1.25 MG: 1.25 SOLUTION RESPIRATORY (INHALATION) at 20:31

## 2019-01-01 RX ADMIN — ALBUTEROL SULFATE 2.5 MG: 2.5 SOLUTION RESPIRATORY (INHALATION) at 02:38

## 2019-01-01 RX ADMIN — ZOLPIDEM TARTRATE 5 MG: 5 TABLET ORAL at 00:39

## 2019-01-01 RX ADMIN — Medication 10 ML: at 16:52

## 2019-01-01 RX ADMIN — CALCIUM GLUCONATE 2 G: 94 INJECTION, SOLUTION INTRAVENOUS at 01:46

## 2019-01-01 RX ADMIN — PHENYLEPHRINE HYDROCHLORIDE 300 MCG/MIN: 10 INJECTION INTRAVENOUS at 19:16

## 2019-01-01 RX ADMIN — INSULIN HUMAN 25 UNITS: 100 INJECTION, SUSPENSION SUBCUTANEOUS at 17:23

## 2019-01-01 RX ADMIN — MEROPENEM 500 MG: 500 INJECTION, POWDER, FOR SOLUTION INTRAVENOUS at 10:17

## 2019-01-01 RX ADMIN — ARFORMOTEROL TARTRATE 15 MCG: 15 SOLUTION RESPIRATORY (INHALATION) at 07:08

## 2019-01-01 RX ADMIN — GUAIFENESIN 1200 MG: 600 TABLET, EXTENDED RELEASE ORAL at 21:13

## 2019-01-01 RX ADMIN — CALCIUM CHLORIDE 1 G: 100 INJECTION INTRAVENOUS; INTRAVENTRICULAR at 09:27

## 2019-01-01 RX ADMIN — METHYLPREDNISOLONE SODIUM SUCCINATE 60 MG: 40 INJECTION, POWDER, FOR SOLUTION INTRAMUSCULAR; INTRAVENOUS at 17:00

## 2019-01-01 RX ADMIN — Medication 10 ML: at 06:48

## 2019-01-01 RX ADMIN — ROSUVASTATIN CALCIUM 20 MG: 10 TABLET, COATED ORAL at 08:54

## 2019-01-01 RX ADMIN — IPRATROPIUM BROMIDE 0.5 MG: 0.5 SOLUTION RESPIRATORY (INHALATION) at 23:56

## 2019-01-01 RX ADMIN — IPRATROPIUM BROMIDE 0.5 MG: 0.5 SOLUTION RESPIRATORY (INHALATION) at 23:17

## 2019-01-01 RX ADMIN — THEOPHYLLINE ANHYDROUS 200 MG: 200 CAPSULE, EXTENDED RELEASE ORAL at 03:20

## 2019-01-01 RX ADMIN — INSULIN HUMAN 25 UNITS: 100 INJECTION, SUSPENSION SUBCUTANEOUS at 16:51

## 2019-01-01 RX ADMIN — LEVALBUTEROL HYDROCHLORIDE 1.25 MG: 1.25 SOLUTION RESPIRATORY (INHALATION) at 11:27

## 2019-01-01 RX ADMIN — FAMOTIDINE 20 MG: 20 TABLET ORAL at 08:03

## 2019-01-01 RX ADMIN — PHENYLEPHRINE HYDROCHLORIDE 70 MCG/MIN: 10 INJECTION INTRAVENOUS at 10:47

## 2019-01-01 RX ADMIN — ALBUTEROL SULFATE 2.5 MG: 2.5 SOLUTION RESPIRATORY (INHALATION) at 18:27

## 2019-01-01 RX ADMIN — LISINOPRIL 2.5 MG: 5 TABLET ORAL at 08:51

## 2019-01-01 RX ADMIN — ENOXAPARIN SODIUM 70 MG: 80 INJECTION SUBCUTANEOUS at 11:43

## 2019-01-01 RX ADMIN — ACETAMINOPHEN 650 MG: 325 TABLET ORAL at 23:07

## 2019-01-01 RX ADMIN — METOPROLOL TARTRATE 2.5 MG: 5 INJECTION INTRAVENOUS at 15:42

## 2019-01-01 RX ADMIN — PHENYLEPHRINE HYDROCHLORIDE 300 MCG/MIN: 10 INJECTION INTRAVENOUS at 08:56

## 2019-01-01 RX ADMIN — LEVALBUTEROL HYDROCHLORIDE 1.25 MG: 1.25 SOLUTION RESPIRATORY (INHALATION) at 15:30

## 2019-01-01 RX ADMIN — ALBUTEROL SULFATE 2.5 MG: 2.5 SOLUTION RESPIRATORY (INHALATION) at 21:36

## 2019-01-01 RX ADMIN — ENOXAPARIN SODIUM 70 MG: 80 INJECTION SUBCUTANEOUS at 12:07

## 2019-01-01 RX ADMIN — IPRATROPIUM BROMIDE 0.5 MG: 0.5 SOLUTION RESPIRATORY (INHALATION) at 03:50

## 2019-01-01 RX ADMIN — LEVALBUTEROL HYDROCHLORIDE 1.25 MG: 1.25 SOLUTION RESPIRATORY (INHALATION) at 19:52

## 2019-01-01 RX ADMIN — NOREPINEPHRINE BITARTRATE 200 MCG/MIN: 1 INJECTION, SOLUTION, CONCENTRATE INTRAVENOUS at 14:22

## 2019-01-01 RX ADMIN — INSULIN LISPRO 3 UNITS: 100 INJECTION, SOLUTION INTRAVENOUS; SUBCUTANEOUS at 11:59

## 2019-01-01 RX ADMIN — SODIUM BICARBONATE 50 MEQ: 84 INJECTION, SOLUTION INTRAVENOUS at 23:33

## 2019-01-01 RX ADMIN — ACETAMINOPHEN 650 MG: 325 TABLET ORAL at 00:34

## 2019-01-01 RX ADMIN — MEROPENEM 500 MG: 500 INJECTION, POWDER, FOR SOLUTION INTRAVENOUS at 02:50

## 2019-01-01 RX ADMIN — HYDROMORPHONE HYDROCHLORIDE 0.5 MG: 1 INJECTION, SOLUTION INTRAMUSCULAR; INTRAVENOUS; SUBCUTANEOUS at 15:59

## 2019-01-01 RX ADMIN — DEXTROSE MONOHYDRATE: 5 INJECTION, SOLUTION INTRAVENOUS at 18:42

## 2019-01-01 RX ADMIN — NOREPINEPHRINE BITARTRATE 180 MCG/MIN: 1 INJECTION, SOLUTION, CONCENTRATE INTRAVENOUS at 02:59

## 2019-01-01 RX ADMIN — GUAIFENESIN 1200 MG: 600 TABLET, EXTENDED RELEASE ORAL at 20:09

## 2019-10-18 PROBLEM — E11.9 DM TYPE 2 (DIABETES MELLITUS, TYPE 2) (HCC): Status: ACTIVE | Noted: 2019-01-01

## 2019-10-18 PROBLEM — J44.1 COPD EXACERBATION (HCC): Status: ACTIVE | Noted: 2019-01-01

## 2019-10-18 PROBLEM — I10 ESSENTIAL HYPERTENSION: Status: ACTIVE | Noted: 2019-01-01

## 2019-10-18 PROBLEM — J96.21 ACUTE ON CHRONIC RESPIRATORY FAILURE WITH HYPOXIA (HCC): Status: ACTIVE | Noted: 2019-01-01

## 2019-10-18 NOTE — ED PROVIDER NOTES
69 y.o. female with past medical history significant for asthma and COPD who presents from home via EMS with chief complaint of SOB. Pt c/o SOB with associated wheezing, and congestion that has been present for about two weeks. Pt also notes some shakiness but attributes it to her albuterol breathing treatments. Pt states she was seen in the ED at [address] [place] [address] two weeks ago and put on steroids BID, given a breathing treatment, and discharged. Pt states her symptoms persisted after her discharge, so she was seen at [address] [place] [address] again, given a breathing treatment, and admitted yesterday. Pt left [facility] overnight due to being upset with care, but decided to some to the ED at [facility] due to persisting symptoms. Pt states she did a breathing treatment at 3:00 AM, 10:00 AM, and then had another en route to the ED with EMS. Pt states she has an Hx of COPD and that her symptoms are similar to her past exacerbations of COPD. Pt states she usually uses 2L home O2 at night but started using 3L two weeks ago after her visit to the [address] [place] [address] ED. There are no other acute medical concerns at this time. Social hx: Positive for tobacco use. PCP: No primary care provider on file. Note written by Thompson Patel, as dictated by Gi Mcintyre MD 2:06 PM 
 
 
The history is provided by the patient and the EMS personnel. No past medical history on file. No past surgical history on file. No family history on file. Social History Socioeconomic History  Marital status:  Spouse name: Not on file  Number of children: Not on file  Years of education: Not on file  Highest education level: Not on file Occupational History  Not on file Social Needs  Financial resource strain: Not on file  Food insecurity:  
  Worry: Not on file Inability: Not on file  Transportation needs:  
  Medical: Not on file Non-medical: Not on file Tobacco Use  Smoking status: Not on file Substance and Sexual Activity  Alcohol use: Not on file  Drug use: Not on file  Sexual activity: Not on file Lifestyle  Physical activity:  
  Days per week: Not on file Minutes per session: Not on file  Stress: Not on file Relationships  Social connections:  
  Talks on phone: Not on file Gets together: Not on file Attends Restoration service: Not on file Active member of club or organization: Not on file Attends meetings of clubs or organizations: Not on file Relationship status: Not on file  Intimate partner violence:  
  Fear of current or ex partner: Not on file Emotionally abused: Not on file Physically abused: Not on file Forced sexual activity: Not on file Other Topics Concern  Not on file Social History Narrative  Not on file ALLERGIES: Demerol [meperidine] Review of Systems Constitutional: Negative for fatigue and fever. HENT: Positive for congestion. Negative for drooling and nosebleeds. Eyes: Negative for pain, itching and visual disturbance. Respiratory: Positive for shortness of breath and wheezing. Negative for cough, choking and stridor. Cardiovascular: Negative for chest pain and leg swelling. Gastrointestinal: Negative for abdominal distention, nausea, rectal pain and vomiting. Endocrine: Negative for heat intolerance and polyphagia. Genitourinary: Negative for dysuria, enuresis and genital sores. Musculoskeletal: Negative for arthralgias, joint swelling and myalgias. Skin: Negative for color change. Allergic/Immunologic: Negative for immunocompromised state. Neurological: Negative for dizziness, tremors, speech difficulty, light-headedness and headaches. Hematological: Negative for adenopathy. Psychiatric/Behavioral: Negative for dysphoric mood and sleep disturbance. All other systems reviewed and are negative. Vitals:  
 10/18/19 1357 BP: 94/83 Pulse: (!) 141 Resp: 29 Temp: 99.1 °F (37.3 °C) SpO2: 98% Weight: 72.1 kg (159 lb) Height: 5' 1\" (1.549 m) Physical Exam  
Constitutional: She appears well-developed and well-nourished. Speaking in short sentences. HENT:  
Head: Normocephalic. Nose: Nose normal.  
Eyes: Conjunctivae and EOM are normal.  
Neck: Normal range of motion. Neck supple. No JVD present. Cardiovascular: Regular rhythm and normal heart sounds. Pulmonary/Chest: She has wheezes (diffuse, moderate). Mild increased work of breathing. Abdominal: Soft. She exhibits no distension. Musculoskeletal: Normal range of motion. She exhibits no deformity. Neurological: She is alert. Coordination normal.  
Skin: Skin is warm and dry. Psychiatric: She has a normal mood and affect. Her behavior is normal.  
Nursing note and vitals reviewed. Note written by Thompson Malone, as dictated by Brittney Dowling MD 2:06 PM 
 
 
MDM Number of Diagnoses or Management Options COPD exacerbation [facility]):  
Diagnosis management comments: COPD exacerbation with moderate increased WOB and diffuse wheeze. Initially refusing BIPAP but symptoms continue after bronchodilator therapy per EMS and on arrival to ED. No concern for unstable airway. Pt anxious and sinus tachycardia on monitor and EKG. She reports 'shaking' and HR due to albuterol. No concern for ACS or PE at this time. On reeval, pt agreeable to try BIPAP. No concern for bacterial infection warranting abx at this time. +smoker and notes did not smoke today. Admitted for further management. Amount and/or Complexity of Data Reviewed Tests in the medicine section of CPT®: ordered and reviewed Decide to obtain previous medical records or to obtain history from someone other than the patient: yes Obtain history from someone other than the patient: yes Review and summarize past medical records: yes Discuss the patient with other providers: yes Independent visualization of images, tracings, or specimens: yes Critical Care Total time providing critical care: 30-74 minutes ED Course as of Oct 19 0757 Fri Oct 18, 2019  
1636 Pt remains with increased WOB. +wheezing on auscultation. HR of 120. Denies hx of anxiety. Will try small dose of ativan Po. No concern for infection or PE at this time. Agreeable to trying BIPAP. Discussed with Dr Jessica Kirkland who will admit. [AL] 1643 ED EKG interpretation: 
Rhythm: sinus tachycardia; and regular . Rate (approx.): 140; Axis: normal; ST/T wave: normal; No evidence of acute coronary ischemia. [AL] ED Course User Index [AL] Robin Hoffman MD  
 
 
Procedures Hospitalist Isa for Admission 4:25 PM 
 
ED Room Number: LY96/24 Patient Name and age:  Jose Maria Jeffrey 69 y.o.  female Working Diagnosis:  
1. COPD exacerbation ([facility] Readmission: no 
Isolation Requirements:  no 
Recommended Level of Care:  telemetry Code Status:  Full Other: COPD exacerbation. Left AMA from OSH. No evidence of infection. Refusing BIPAP at this time.

## 2019-10-18 NOTE — Clinical Note
History and physical documented and up to date, allergies reviewed, lab results reviewed and procedural consent signed.

## 2019-10-18 NOTE — PROGRESS NOTES
BSI: MED RECONCILIATION Comments/Recommendations:  
? Patient alert and oriented at time of interview. ? Patient confirmed name and date of birth. ? Preferred pharmacy is [facility] at [address] [place]. ? Information regarding strengths of medications confirmed via refill history in RxQuery. ? Insulin glargine and Sitagliptin: patient said she did not eat much yesterday so she did not take her insulin injection or sitagliptin. Usually, she inject 14-16 units insulin daily. ? Nasal sprays: patient said that she uses two nasal sprays, but cannot recall the names. Per refill history, one of the nasal sprays is Atrovent (Ipratropium) nasal spray. Patient confirmed this after pharmacist mentioned the name. She cannot recall the other nasal spray and there is no available refill history in RxQuery for any other nasal sprays. ? Injection every 6 months by [facility] doctor. \" Patient does not know the name of the injection. Information obtained from: patient and RxQuery Allergies: Chocolate [cocoa]; Demerol [meperidine]; Other plant, animal, environmental; and Tape [adhesive] Prior to Admission Medications Prescriptions Last Dose Informant Patient Reported? Taking? HYDROcodone-acetaminophen (NORCO) 7.5-325 mg per tablet 10/11/2019 at Unknown time Self Yes Yes Sig: Take 1 Tab by mouth four (4) times daily as needed for Pain. OTHER  Self Yes Yes Sig: by Both Nostrils route. Nasal spray - patient does not recall the name. SITagliptin (JANUVIA) 100 mg tablet 10/16/2019 Self Yes Yes Sig: Take 100 mg by mouth daily. acetaminophen (TYLENOL) 500 mg tablet  Self Yes Yes Sig: Take 500 mg by mouth every six (6) hours as needed for Pain. albuterol (PROVENTIL VENTOLIN) 2.5 mg /3 mL (0.083 %) nebu 10/18/2019 at Unknown time Self Yes Yes Si.5 mg by Nebulization route every four (4) hours as needed. albuterol (VENTOLIN HFA) 90 mcg/actuation inhaler 10/18/2019 at Unknown time Self Yes Yes Sig: Take 2 Puffs by inhalation every four (4) hours as needed for Wheezing. calcium carbonate (TUMS) 200 mg calcium (500 mg) chew  Self Yes Yes Sig: Take 1 Tab by mouth three (3) times daily as needed (indigestion). dextromethorphan-guaiFENesin (ROBITUSSIN-DM)  mg/5 mL syrup  Self Yes Yes Sig: Take 10 mL by mouth every four (4) hours as needed for Cough. fluticasone propion-salmeterol (ADVAIR DISKUS) 500-50 mcg/dose diskus inhaler 10/18/2019 at AM Self Yes Yes Sig: Take 1 Puff by inhalation two (2) times a day.  
hydrOXYzine HCl (ATARAX) 25 mg tablet 10/18/2019 at Unknown time Self Yes Yes Sig: Take 25 mg by mouth four (4) times daily as needed (allergy symptoms). hydrocortisone (CORTAID) 1 % topical cream  Self Yes Yes Sig: Apply  to affected area as needed for Skin Irritation or Itching. use thin layer  
ibuprofen (MOTRIN) 800 mg tablet  Self Yes Yes Sig: Take 800 mg by mouth three (3) times daily as needed for Pain. insulin glargine (LANTUS SOLOSTAR U-100 INSULIN) 100 unit/mL (3 mL) inpn 10/16/2019 Self Yes Yes Si-16 Units by SubCUTAneous route daily. ipratropium (ATROVENT HFA) 17 mcg/actuation inhaler 10/18/2019 at Unknown time Self Yes Yes Sig: Take 4 Puffs by inhalation every six (6) hours. ipratropium (ATROVENT) 0.03 % nasal spray  Self Yes Yes Si Sprays by Both Nostrils route three (3) times daily. lisinopril (PRINIVIL, ZESTRIL) 2.5 mg tablet 10/18/2019 at Unknown time Self Yes Yes Sig: Take 2.5 mg by mouth daily. mineral oil-isopropyl myristat (EUCERIN) lotion  Self Yes Yes Sig: Apply  to affected area as needed for Dry Skin. predniSONE (DELTASONE) 5 mg tablet 10/18/2019 at AM Self Yes Yes Sig: Take 5 mg by mouth two (2) times a day. raNITIdine (ZANTAC) 300 mg tab 10/17/2019 at Unknown time Self Yes Yes Sig: Take 300 mg by mouth daily. rosuvastatin (CRESTOR) 20 mg tablet 10/18/2019 at Unknown time Self Yes Yes Sig: Take 20 mg by mouth daily. theophylline ER (DAVID-24) 200 mg cp24 capsule 10/18/2019 at Unknown time Self Yes Yes Sig: Take 200 mg by mouth every six (6) hours. tiotropium (SPIRIVA WITH HANDIHALER) 18 mcg inhalation capsule 10/18/2019 at Unknown time Self Yes Yes Sig: Take 1 Cap by inhalation daily. zolpidem (AMBIEN) 10 mg tablet 10/16/2019 Self Yes Yes Sig: Take 10 mg by mouth nightly as needed for Sleep.   
  
 
Radha Grossman, Pharmacist

## 2019-10-18 NOTE — ED NOTES
TRANSFER - OUT REPORT: 
 
Verbal report given to Tracy(name) on Mika Lopez  being transferred to PCC(unit) for routine progression of care Report consisted of patients Situation, Background, Assessment and  
Recommendations(SBAR). Information from the following report(s) SBAR, ED Summary and Cardiac Rhythm ST @ 130 was reviewed with the receiving nurse. Lines:  
Peripheral IV 10/18/19 Left Arm (Active) Peripheral IV 10/18/19 Right;Posterior Forearm (Active) Site Assessment Clean, dry, & intact 10/18/2019  2:25 PM  
Phlebitis Assessment 0 10/18/2019  2:25 PM  
Infiltration Assessment 0 10/18/2019  2:25 PM  
Dressing Status Clean, dry, & intact 10/18/2019  2:25 PM  
Dressing Type Tape;Transparent 10/18/2019  2:25 PM  
Hub Color/Line Status Flushed 10/18/2019  2:25 PM  
Action Taken Blood drawn 10/18/2019  2:25 PM  
  
 
Opportunity for questions and clarification was provided. Patient transported with: 
 Monitor O2 @ 2 liters Registered Nurse

## 2019-10-18 NOTE — ED NOTES
Patient Throughput:  Charge nurse on [facility] made aware of patient's room assignment, room 323 Current MEWS score of 3 Baron Sameer RN Shift Resource Nurse Emergency Department

## 2019-10-18 NOTE — ED NOTES
Patient back from dx imaging with c/o increased sob after the radiology tech had patient stand up and move with a copd exacerbation and did not obtain report from the nurse. Upon arrival patient reported that she could not catch her breath and noted that her heart rate was 140's. Dr. Joshua Tim made aware and ordered bipap for patient. Went to speak to the patient about bipap she reported that she can not tolerate the bipap mask because she is claustrophobic. Patient's HR, WOB, and SOB was starting to improve once she got back in the bed. Dr. Joshua Tim will be back to assess patient.

## 2019-10-18 NOTE — ED NOTES
Called and spoke to Dr. Philipp Levine to inform that the patient has no teeth and needs to have a mechanically altered diet. Also informed that MEWs score went from 3 to 5.

## 2019-10-18 NOTE — PROGRESS NOTES
10/18/2019 
6:33 PM 
Case management note Reason for Admission:   COPD Patient came to hospital today for increased SOB. Patient stated she went to [facility] oct 4 for 5 days, went back yesterday and was discharged this am. She went home and continue to feel bad. Patient had continued to smoke, but stated she got someone to throw out all her cigarettes Patient has history of COPD and Asthma. She has oxygen with [employer], 2l x 24hr. She has a nebulizer and stated she had med for it. Patient lives alone, drives and has one step to enter. She has supportive family near by. Sheryl @ [facility]/tin page 
She follows a pulmonologist at [address] [place] [address] Score:     17 Do you (patient/family) have any concerns for transition/discharge? Lives alone Plan for utilizing home health:   Patient would benefit from home health Current Advanced Directive/Advance Care Plan:  Does not have, discussed she could complete by alia services while in hospital 
         
Transition of Care Plan: 1. Home with family assistance 2. PCP follow up 3. Home health vs rehab 4. PT/ OT   
5. AD planning 6. CM to follow until discharge Care Management Interventions PCP Verified by CM: Yes(dr cane [facility] no nn) Mode of Transport at Discharge: Self Transition of Care Consult (CM Consult): Discharge Planning Current Support Network: Lives Alone Confirm Follow Up Transport: Family Plan discussed with Pt/Family/Caregiver: Yes Discharge Location Discharge Placement: Home with family assistance [place], [address]

## 2019-10-18 NOTE — ED TRIAGE NOTES
Patient c/o sob x several days, seen at [address] [place] [address] and admitted yesterday, left  overnight due to being upset with care received. Patient has hx of asthma and copd.

## 2019-10-18 NOTE — H&P
Admission History and Physical 
 
 
NAME:  Benny De La Paz :   1945 MRN:  [id number] PCP:  Jarred Underwood MD  
 
Date/Time:  10/18/2019 Assessment/Plan: COPD exacerbation ([facility] (10/18/2019):  Reports hx of lupus lung disease. However, also has significant smoking hx. Has diffuse wheezing and poor airmovement. Followed by [facility]. -- scheduled steroids IV 
-- scheduled duonebs and prn albuterol 
-- continue LABA/ICS 
-- pulm consult 
-- add po doxy -- continue meche but check level Acute on chronic respiratory failure with hypoxia (HCC) (10/18/2019):  Uses 2L NC at night. ABG 7.39/44/104/26 on 2L. -- continue oxygen continuous -- low threshold for BiPAP for increased work of breathing or hypoxia DM type 2 (diabetes mellitus, type 2) ([place] [address].) (10/18/2019):  Likely to have hyperglycemia while on steroids. -- continue lantus, but decrease dose for now 
-- check A1c 
-- add SSI Essential hypertension (10/18/2019): Initial BP a bit low. -- hold lisinopril for now Hyponatremia:  Mild. No prior labs for comparison. -- repeat labs in AM 
-- check TSH    
-- NS IVF Tobacco Abuse:  Counseled patient on importance of tobacco cessation. -- nicotine patch while inpatient Subjective: CHIEF COMPLAINT:  SOB HISTORY OF PRESENT ILLNESS:    
Ms. Naz Christian is a 69 y.o.  female who is admitted with COPD exacerbation ([facility] Ms. Naz Christian presented to the Emergency Department today complaining of SOB. Patient reports SOB since earlier this month. Was admitted to [address] [place] [address] on 10/4 for SOB due to COPD exacerbation. Was discharged home after stay and felt improved. Was home for a week and developed SOB again 2-3 days ago. Worse with minimal exertion. Has oxygen at home to use nightly, but started using during the day as well. Minimal improvement with nebs and inhaler at home. No fever, chills.   Cough, nonprod. No chest pain. Still smokes tobacco.  Was seen at [facility] ED yesterday and admitted but left AMA today and went home. At home felt worse, so brought to ED. Past Medical History:  
Diagnosis Date  Asthma  Chronic obstructive pulmonary disease ([facility]  Diabetes ([facility]  Hypertension  Lupus ([facility] Past Surgical History:  
Procedure Laterality Date  HX HERNIA REPAIR Social History Tobacco Use  Smoking status: Current Every Day Smoker Packs/day: 2.00 Years: 57.00 Pack years: 114.00  Smokeless tobacco: Never Used Substance Use Topics  Alcohol use: Not Currently Family History Problem Relation Age of Onset  Coronary Artery Disease Son  Stroke Son Allergies Allergen Reactions  Demerol [Meperidine] Hives Prior to Admission medications Not on File Review of Systems: 
(bold if positive, if negative) Gen:  fatigueEyes:  ENT:  CVS:  Pulm:  Cough, dyspneaGI:   
:   
MS:  arthritisSkin:  Endo:   
Hem:  Renal:   
Neuro:    
 
 
  
Objective: VITALS:   
Vital signs reviewed; most recent are: 
 
Visit Vitals /65 Pulse (!) 123 Temp 99 °F (37.2 °C) Resp 20 Ht 5' 1\" (1.549 m) Wt 72.1 kg (159 lb) SpO2 96% BMI 30.04 kg/m² SpO2 Readings from Last 6 Encounters:  
10/18/19 96% O2 Flow Rate (L/min): 2 l/min No intake or output data in the 24 hours ending 10/18/19 8554 Exam:  
 
Physical Exam: 
 
Gen:  Well-developed, well-nourished, in mild respiratory distress HEENT:  Pink conjunctivae, PERRL, hearing intact to voice, moist mucous membranes Neck:  Supple Resp:  +accessory muscle use, poor air movement with exp wheezing b/l, speaking in full sentences Card:  No murmurs, normal S1, S2 without thrills or peripheral edema Abd:  Soft, non-tender, non-distended, normoactive bowel sounds are present Musc:  No cyanosis Skin:  No rashes or ulcers, skin turgor is good Neuro:  Cranial nerves 3-12 are grossly intact,  strength is 5/5 bilaterally, dorsi / plantarflexion strength is 5/5 bilaterally, follows commands appropriately, tremulous Psych:  Alert with good insight. Oriented to person, place, and time Labs: 
 
Recent Labs 10/18/19 
1426 WBC 8.4 HGB 12.2 HCT 38.6  Recent Labs 10/18/19 
1426 * K 4.7  CO2 28 * BUN 24* CREA 1.27* CA 8.5 ALB 3.1* TBILI 0.6 SGOT 35 ALT 29 Lab Results Component Value Date/Time Glucose (POC) 75 02/16/2010 11:38 PM  
 
Recent Labs 10/18/19 
1650 PH 7.39  
PCO2 44 PO2 104* HCO3 26 No results for input(s): INR, INREXT, INREXT in the last 72 hours. Chest Xray: no acute process Surrogate decision maker:  daughter Total time spent in care of this patient: 50 Minutes Care Plan discussed with: Patient Discussed:  Care Plan Prophylaxis:  Lovenox Probable Disposition:  Home w/Family 
        
___________________________________________________ Attending Physician: Henri Huerta MD

## 2019-10-18 NOTE — ED NOTES
Bedside and Verbal shift change report given to Vani Tay RN (oncoming nurse) by Hector Woo (offgoing nurse). Report included the following information SBAR, ED Summary, Cardiac Rhythm ST and Alarm Parameters .

## 2019-10-19 PROBLEM — E78.5 DYSLIPIDEMIA: Status: ACTIVE | Noted: 2019-01-01

## 2019-10-19 PROBLEM — M32.13: Status: ACTIVE | Noted: 2019-01-01

## 2019-10-19 NOTE — CONSULTS
Asked to see in regards to dyspnea Impression: 1. Dyspnea is multifactorial 
 -COPD 
 -Asthma 
 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 
 -She is at risk for PE due to recent illness and limited mobility She was recently a JW. She left because she was not getting her breathing treatments as she felt that she should She is followed at [facility] by Pulmonary 2. She does not appear to be infected 3. She has a little edema in her extremities but does not have evidence of CHF. 4. She has some constipation Plan: 1. Add Mucinex 2. Add Sorbitol for her bowels 3. Continue nebs, steroids 4. Spiral Chest CT to look for PE. 
5. Low dose Lasix 20 mg daily by mouth Dictated

## 2019-10-19 NOTE — PROGRESS NOTES
Attempts to insert 20G IV site in the [facility] or [facility] for CTA of the chest has been unsuccessful X2 attempts. IV's have been inserted and patient sent to CT and while in the CT the IV sites have infilitrated. Ultrasound guided insertion resources are not available at this time. Will pass on in report that the IV and CTA are still needed. 1930 Bedside shift change report given to [place] (oncoming nurse) by April (offgoing nurse). Report included the following information SBAR.

## 2019-10-19 NOTE — PROGRESS NOTES
Myke Red [facility] 79 
[address] [facility], [place], [address] [place] [address] 
[phone] Medical Progress Note NAME:         Corby Rouse :        1945 MRM:        [id number] Date:          10/19/2019 Subjective: Patient has been seen and examined as a follow up for multiple medical issues. Chart, labs, diagnostics reviewed. She remains SOB, moderately severe, at rest although she denies any cough. No fever or chills. Weak Objective: 
 
Vital Signs: 
 
Visit Vitals /61 (BP 1 Location: Right arm, BP Patient Position: At rest;Supine) Pulse (!) 125 Temp 98.2 °F (36.8 °C) Resp 24 Ht 5' 1\" (1.549 m) Wt 69 kg (152 lb 1.9 oz) SpO2 98% BMI 28.74 kg/m² Intake/Output Summary (Last 24 hours) at 10/19/2019 1101 Last data filed at 10/19/2019 1017 Gross per 24 hour Intake 1775 ml Output 750 ml Net 1025 ml Physical Examination: 
 
General:   Weak and ill looking female patient, uncomfortable from dyspnea Eyes:   pink conjunctivae, PERRLA with no discharge. ENT:   no ottorrhea or rhinorrhea with dry mucous membranes Neck: no masses, thyroid non-tender and trachea central. 
Pulm:  ++ accessory muscle use, decreased breath sounds without crackles. ++ wheezes Card:  no JVD or murmurs, has sinus tachycardia, without thrills, bruits or peripheral edema Abd:  Soft, non-tender, non-distended, normoactive bowel sounds with no palpable organomegaly Musc:  No cyanosis, clubbing, atrophy or deformities. Skin:  No rashes, bruising or ulcers. Neuro: Awake and alert. Generally a non focal exam. Follows commands appropriately Psych:  Has a fair insight and is oriented x 3 Current Facility-Administered Medications Medication Dose Route Frequency  influenza vaccine - (6 mos+)(PF) (FLUARIX/FLULAVAL/FLUZONE QUAD) injection 0.5 mL  0.5 mL IntraMUSCular PRIOR TO DISCHARGE  
  insulin NPH (NOVOLIN N, HUMULIN N) injection 20 Units  20 Units SubCUTAneous ACB&D  
 sodium chloride (NS) flush 5-40 mL  5-40 mL IntraVENous Q8H  
 sodium chloride (NS) flush 5-40 mL  5-40 mL IntraVENous PRN  
 acetaminophen (TYLENOL) tablet 650 mg  650 mg Oral Q4H PRN  
 ondansetron (ZOFRAN) injection 4 mg  4 mg IntraVENous Q4H PRN  
 enoxaparin (LOVENOX) injection 40 mg  40 mg SubCUTAneous Q24H  
 albuterol-ipratropium (DUO-NEB) 2.5 MG-0.5 MG/3 ML  3 mL Nebulization Q4HWA RT  
 albuterol (PROVENTIL VENTOLIN) nebulizer solution 2.5 mg  2.5 mg Nebulization Q2H PRN  
 doxycycline (VIBRA-TABS) tablet 100 mg  100 mg Oral BID WITH MEALS  insulin lispro (HUMALOG) injection   SubCUTAneous QID WITH MEALS  glucose chewable tablet 16 g  4 Tab Oral PRN  
 glucagon (GLUCAGEN) injection 1 mg  1 mg IntraMUSCular PRN  
 dextrose 10% infusion 0-250 mL  0-250 mL IntraVENous PRN  
 methylPREDNISolone (PF) (SOLU-MEDROL) injection 60 mg  60 mg IntraVENous Q6H  
 arformoterol (BROVANA) neb solution 15 mcg  15 mcg Nebulization BID RT  
 budesonide (PULMICORT) 500 mcg/2 ml nebulizer suspension  500 mcg Nebulization BID RT  
 nicotine (NICODERM CQ) 14 mg/24 hr patch 1 Patch  1 Patch TransDERmal DAILY  hydrOXYzine HCl (ATARAX) tablet 25 mg  25 mg Oral QID PRN  
 rosuvastatin (CRESTOR) tablet 20 mg  20 mg Oral DAILY  theophylline ER (DAVID-24) capsule 200 mg  200 mg Oral Q6H  
 famotidine (PEPCID) tablet 20 mg  20 mg Oral BID  
 0.9% sodium chloride infusion  100 mL/hr IntraVENous CONTINUOUS  
 zolpidem (AMBIEN) tablet 5 mg  5 mg Oral QHS PRN Laboratory data and review: 
 
Recent Labs 10/19/19 
0340 10/18/19 
1426 WBC 3.6 8.4 HGB 10.2* 12.2 HCT 32.8* 38.6 * 170 Recent Labs 10/19/19 
0340 10/18/19 
1426 * 133* K 4.1 4.7  100 CO2 24 28 * 183* BUN 29* 24* CREA 1.25* 1.27* CA 7.7* 8.5 MG 1.8  --   
ALB  --  3.1*  
SGOT  --  35 ALT  --  29  
 No components found for: [facility]: 
 
Telemetry reviewed by me:  Sinus tachycardia Chest X ray - unremarkable Assessment and Plan: COPD with acute exacerbation ([facility].) (10/18/2019) / Hx  Lupus disease of lung ([facility] 75.) (10/19/2019): still smokes and remains symptomatic. Check RVP. Continue high dose IV Solumedrol, Pulmicort, Brovana, empiric Doxycycline and theophylline. Await Pulm consult DM type 2 (diabetes mellitus, type 2) ([facility] 75.) (10/18/2019): uncontrolled with hyperglycemia. Due to steroids. A1c 8.4. Start NPH and hold lantus. SSi per protocol. DM diet Acute on chronic respiratory failure with hypoxia ([facility].) (10/18/2019): continue supplemental oxygen and wean as tolerated to baseline. She says she only uses it at night Dyslipidemia (10/19/2019): continue Crestor Essential hypertension (10/18/2019): BP stable. No listed home BP medications. Monitor Total time spent for the patient's care: 35 Minutes Care Plan discussed with: Patient and Nursing Staff Discussed:  Care Plan and D/C Planning Prophylaxis:  Lovenox Anticipated Disposition:  Home w/Family 
        
___________________________________________________ Attending Physician:   Isabelle Lee MD

## 2019-10-19 NOTE — PROGRESS NOTES
1935: TRANSFER - IN REPORT: 
 
Verbal report received from [facility], RN(name) on Petros Romo  being received from ED(unit) for routine progression of care Report consisted of patients Situation, Background, Assessment and  
Recommendations(SBAR). Information from the following report(s) SBAR, Kardex, ED Summary, Intake/Output, MAR, Accordion and Cardiac Rhythm Elías Solitario was reviewed with the receiving nurse. Opportunity for questions and clarification was provided. Assessment completed upon patients arrival to unit and care assumed. 2025: Patient received to unit Shift Summary 2038: Patient HR highly elevated, very PORTER after ambulating to RR. Dr. Imelda Kennedy aware. Asked respiratory to come back in 15 minutes after patient HR settles down from RR. Very poor air movement in lungs- VERY diminished 2057: Started 500cc bolus ordered in ED. Sent down outstanding blood samples needed. 2145: Dr. Imelda Kennedy on floor. Notified him of elevated BG (336), asked if insulin should be given d/t how late last sugar taken/insuling given (10units at 2011). Per MD, hold for now and recheck at 0000. Talked to MD about HR sustaining in 120s, up to 140s when up/ on exertion. MD aware, no further orders at this time. 2340: Called Dr. Sara Hurst for Ambien order. 5mg rather than PTA 10mg ordered d/t concerns about breathing. 0030: Patient SOB, tight, wheezing. Called RT for prn albuterol neb 0230: PCT helped pt to RR. Highly dyspnic afterwards. Coached patient through pursed lip breathing, placed pure wick for next time and agreed that patient would not walk to RR until breathing improves 0320: Very little air movement, some high pitched wheezing. Called RT for neb 
 
0400: Much more air movement following nebs. 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 Placed order for MRSA culture. 0600: MRSA culture to lab, contact placed.  Patient complaining of external catheter causing soreness. Removed, obtained bedside commode. 0730: Bedside and Verbal shift change report given to Suad Dee RN (oncoming nurse) by Tejas Crump RN (offgoing nurse). Report included the following information SBAR, Kardex, Procedure Summary, Intake/Output, MAR, Accordion and Cardiac Rhythm Stach. Care Plan Summary Problem: Diabetes Self-Management Goal: *Disease process and treatment process Description Define diabetes and identify own type of diabetes; list 3 options for treating diabetes. Outcome: Progressing Towards Goal 
Goal: *Incorporating nutritional management into lifestyle Description Describe effect of type, amount and timing of food on blood glucose; list 3 methods for planning meals. Outcome: Progressing Towards Goal 
Goal: *Incorporating physical activity into lifestyle Description State effect of exercise on blood glucose levels. Outcome: Progressing Towards Goal 
Goal: *Developing strategies to promote health/change behavior Description Define the ABC's of diabetes; identify appropriate screenings, schedule and personal plan for screenings. Outcome: Progressing Towards Goal 
Goal: *Using medications safely Description State effect of diabetes medications on diabetes; name diabetes medication taking, action and side effects. Outcome: Progressing Towards Goal 
Goal: *Monitoring blood glucose, interpreting and using results Description Identify recommended blood glucose targets  and personal targets. Outcome: Progressing Towards Goal 
Goal: *Prevention, detection, treatment of acute complications Description List symptoms of hyper- and hypoglycemia; describe how to treat low blood sugar and actions for lowering  high blood glucose level. Outcome: Progressing Towards Goal 
Goal: *Prevention, detection and treatment of chronic complications Description Define the natural course of diabetes and describe the relationship of blood glucose levels to long term complications of diabetes. Outcome: Progressing Towards Goal 
Goal: *Developing strategies to address psychosocial issues Description Describe feelings about living with diabetes; identify support needed and support network Outcome: Progressing Towards Goal 
Goal: *Insulin pump training Outcome: Progressing Towards Goal 
Goal: *Sick day guidelines Outcome: Progressing Towards Goal 
Goal: *Patient Specific Goal (EDIT GOAL, INSERT TEXT) Outcome: Progressing Towards Goal 
  
Problem: Patient Education: Go to Patient Education Activity Goal: Patient/Family Education Outcome: Progressing Towards Goal 
  
Problem: Falls - Risk of 
Goal: *Absence of Falls Description Document [place] Fall Risk and appropriate interventions in the flowsheet. Outcome: Progressing Towards Goal 
Note:  
Fall Risk Interventions: 
Mobility Interventions: Patient to call before getting OOB Medication Interventions: Patient to call before getting OOB, Teach patient to arise slowly History of Falls Interventions: Investigate reason for fall, Door open when patient unattended Problem: Patient Education: Go to Patient Education Activity Goal: Patient/Family Education Outcome: Progressing Towards Goal 
  
Problem: Pressure Injury - Risk of 
Goal: *Prevention of pressure injury Description Document Paul Scale and appropriate interventions in the flowsheet. Outcome: Progressing Towards Goal 
Note:  
Pressure Injury Interventions: 
Moisture Interventions: Absorbent underpads, Limit adult briefs Activity Interventions: Increase time out of bed, PT/OT evaluation Mobility Interventions: PT/OT evaluation Nutrition Interventions: Document food/fluid/supplement intake, Offer support with meals,snacks and hydration Friction and Shear Interventions: Minimize layers Problem: Patient Education: Go to Patient Education Activity Goal: Patient/Family Education Outcome: Progressing Towards Goal 
  
 Problem: Activity Intolerance Goal: *Oxygen saturation during activity within specified parameters Outcome: Progressing Towards Goal 
Goal: *Able to remain out of bed as prescribed Outcome: Progressing Towards Goal 
  
Problem: Patient Education: Go to Patient Education Activity Goal: Patient/Family Education Outcome: Progressing Towards Goal

## 2019-10-20 NOTE — CONSULTS
[facility] [facility] Name:  Azam Bone 
MR#:  [id number] :  1945 ACCOUNT #:  [de-identified] DATE OF SERVICE:  10/18/2019 HISTORY OF PRESENT ILLNESS:  This is a 78-year-old female that we were asked to see by Dr. Choa Roa in regards to breathlessness. She has a history of asthma and COPD. She also has interstitial lung disease related to lupus. She reports that she has been having issues with her breathing over the last few weeks. She was recently at [facility].  She was treated with antibiotic, steroids, and nebs. She was discharged. She then had issues for continued breathing problems. She presents for further management and evaluation. She reports that while she was at [facility], she did not get a spiral CT of the chest.  She gets very breathless with activity. She has a nebulizer that she uses at home. She normally only uses supplemental oxygen at 2 liters per minute at night. She denies fevers, chills, or sweats. She denies purulent sputum. She does have some peripheral edema but does not have significant evidence of a left ventricular failure. She has been having some issues with constipation. She does have a history of significant smoking in the past.  She denies any occupational lung exposure. She denies a history of tuberculosis or TB exposure. PAST MEDICAL HISTORY:  Notable for her lung disease. She has type 2 diabetes. She has known hypertension. She also has a history of lupus. She is followed by Dr. Gloria Danielle at [facility]. REVIEW OF SYSTEMS:  A 10-system comprehensive review of systems was performed. It was remarkable for no problems with her eyes, ears, nose, or throat. Respiratory as per HPI. She denies any cardiovascular, GI, , dermatologic, hematologic, kidney, or neurologic issues.   She does have issues with lots of joint complaints and back issues related to her lupus as well as her degenerative arthritis. The rest is as per HPI. PHYSICAL EXAMINATION: 
VITAL SIGNS:  She is afebrile. T-max is 99.1, respiratory rate of 16, heart rate in the 120s, blood pressure 139/65. HEENT:  She is anicteric. Oropharynx is crowded. NECK:  Supple. There is no JVD. There is no adenopathy. LUNGS:  She has some wheezes noted bilaterally. She has decreased breath sounds. HEART:  Regular rate and rhythm. ABDOMEN:  Obese, soft, nontender. Active bowel sounds. There is no hepatosplenomegaly. EXTREMITIES:  Remarkable for 1+ edema. There is no calf tenderness. There is no asymmetry. There is no palpable cord. SKIN:  Warm and dry. NEUROLOGIC:  She is grossly intact. MUSCULOSKELETAL:  She has fair tone.  AND RECTAL EXAM:  Deferred. IMPRESSION:  I think her dyspnea most likely is multifactorial.  She could be having some element of asthma with COPD contributing to an exacerbation. She reports that she does have some environmental allergies that trigger things. She does have interstitial lung disease related to lupus. In light of her obesity and recent immobility, she is at risk for pulmonary emboli. She also could have some element of diastolic heart failure. She does not appear to have significant left-sided failure but does have some trace edema. She also has some element of constipation which is causing her a restrictive ventilatory defect. PLAN:  We will add Mucinex to see if that will help her clear secretions. We will add sorbitol to see if that will help to get her bowels moving. We will continue nebs and steroids. We will do a spiral CT of the chest to look for evidence of pulmonary emboli. I will also start her on some low dose Lasix tomorrow at 20 mg by mouth to potentially help with some of her fluid issues. Thank you very much for asking us to see the patient in consultation.   We will be glad to assist in her management as you feel clinically indicated. Romeo Watson MD 
 
 
[id number]/ 
D:  10/19/2019 13:18 
T:  10/20/2019 0:31 
JOB #:  [id number] CC:  Yanelis Chen MD

## 2019-10-20 NOTE — PROGRESS NOTES
Bedside shift change report given to [facility] (oncoming nurse) by April (offgoing nurse). Report included the following information SBAR.

## 2019-10-20 NOTE — PROGRESS NOTES
Myke Red [facility] 
[facility], [address] [place] [address] 
[phone] Medical Progress Note NAME:         Sherman Lynne :        1945 MRM:        [id number] Date:          10/20/2019 Subjective: Patient has been seen and examined as a follow up for multiple medical issues. Chart, labs, diagnostics reviewed. She remains very SOB, moderately severe, at rest although she denies any cough. Persistent tachycardia. No fever or chills. Weak Objective: 
 
Vital Signs: 
 
Visit Vitals /59 (BP 1 Location: Left arm, BP Patient Position: At rest) Pulse (!) 125 Temp 98.7 °F (37.1 °C) Resp 25 Ht 5' 1\" (1.549 m) Wt 70.5 kg (155 lb 6.8 oz) SpO2 100% BMI 29.37 kg/m² Intake/Output Summary (Last 24 hours) at 10/20/2019 1008 Last data filed at 10/20/2019 1001 Gross per 24 hour Intake 1893.33 ml Output 1800 ml Net 93.33 ml Physical Examination: 
 
General:   Weak and ill looking female patient, uncomfortable from dyspnea Eyes:   pink conjunctivae, PERRLA with no discharge. ENT:   no ottorrhea or rhinorrhea with dry mucous membranes Neck: no masses, thyroid non-tender and trachea central. 
Pulm:  ++ accessory muscle use, decreased breath sounds without crackles. ++ wheezes Card:  no JVD or murmurs, has sinus tachycardia, without thrills, bruits or peripheral edema Abd:  Soft, non-tender, non-distended, normoactive bowel sounds Musc:  No cyanosis, clubbing, atrophy or deformities. Skin:  No rashes, bruising or ulcers. Neuro: Awake and alert. Generally a non focal exam. Follows commands appropriately Psych:  Has a fair insight and is oriented x 3 Current Facility-Administered Medications Medication Dose Route Frequency  levalbuterol (XOPENEX) nebulizer soln 1.25 mg/3 mL  1.25 mg Nebulization Q4H RT  
  ipratropium (ATROVENT) 0.02 % nebulizer solution 0.5 mg  0.5 mg Nebulization Q4H RT  
 iopamidol (ISOVUE-370) 76 % injection 100 mL  100 mL IntraVENous RAD ONCE  
 enoxaparin (LOVENOX) injection 70 mg  1 mg/kg SubCUTAneous Q12H  
 influenza vaccine 2019-20 (6 mos+)(PF) (FLUARIX/FLULAVAL/FLUZONE QUAD) injection 0.5 mL  0.5 mL IntraMUSCular PRIOR TO DISCHARGE  insulin NPH (NOVOLIN N, HUMULIN N) injection 20 Units  20 Units SubCUTAneous ACB&D  
 guaiFENesin ER (MUCINEX) tablet 1,200 mg  1,200 mg Oral Q12H  sorbitol 70 % solution 30 mL  30 mL Oral DAILY PRN  
 furosemide (LASIX) tablet 20 mg  20 mg Oral DAILY  lisinopril (PRINIVIL, ZESTRIL) tablet 2.5 mg  2.5 mg Oral DAILY  sodium chloride (NS) flush 5-40 mL  5-40 mL IntraVENous Q8H  
 sodium chloride (NS) flush 5-40 mL  5-40 mL IntraVENous PRN  
 acetaminophen (TYLENOL) tablet 650 mg  650 mg Oral Q4H PRN  
 ondansetron (ZOFRAN) injection 4 mg  4 mg IntraVENous Q4H PRN  
 albuterol (PROVENTIL VENTOLIN) nebulizer solution 2.5 mg  2.5 mg Nebulization Q2H PRN  
 doxycycline (VIBRA-TABS) tablet 100 mg  100 mg Oral BID WITH MEALS  insulin lispro (HUMALOG) injection   SubCUTAneous QID WITH MEALS  glucose chewable tablet 16 g  4 Tab Oral PRN  
 glucagon (GLUCAGEN) injection 1 mg  1 mg IntraMUSCular PRN  
 dextrose 10% infusion 0-250 mL  0-250 mL IntraVENous PRN  
 methylPREDNISolone (PF) (SOLU-MEDROL) injection 60 mg  60 mg IntraVENous Q6H  
 arformoterol (BROVANA) neb solution 15 mcg  15 mcg Nebulization BID RT  
 budesonide (PULMICORT) 500 mcg/2 ml nebulizer suspension  500 mcg Nebulization BID RT  
 nicotine (NICODERM CQ) 14 mg/24 hr patch 1 Patch  1 Patch TransDERmal DAILY  hydrOXYzine HCl (ATARAX) tablet 25 mg  25 mg Oral QID PRN  
 rosuvastatin (CRESTOR) tablet 20 mg  20 mg Oral DAILY  theophylline ER (DAVID-24) capsule 200 mg  200 mg Oral Q6H  
 famotidine (PEPCID) tablet 20 mg  20 mg Oral BID  
  zolpidem (AMBIEN) tablet 5 mg  5 mg Oral QHS PRN Laboratory data and review: 
 
Recent Labs 10/19/19 
0340 10/18/19 
1426 WBC 3.6 8.4 HGB 10.2* 12.2 HCT 32.8* 38.6 * 170 Recent Labs 10/19/19 
0340 10/18/19 
1426 * 133* K 4.1 4.7  100 CO2 24 28 * 183* BUN 29* 24* CREA 1.25* 1.27* CA 7.7* 8.5 MG 1.8  --   
ALB  --  3.1*  
SGOT  --  35 ALT  --  29 No components found for: [facility]: 
 
Telemetry reviewed by me:  Sinus tachycardia Chest X ray - unremarkable Assessment and Plan: COPD with acute exacerbation ([facility] (10/18/2019) / Hx  Lupus disease of lung ([facility] (10/19/2019): still smokes and remains symptomatic. RVP pending. Unable to get CTA today. Start therapeutic enoxaparin pending imaging. Continue high dose IV Solumedrol, Pulmicort, Brovana, empiric Doxycycline and theophylline. Pulmonology following. Sinus tachycardia POA: still persistent. Presumed to be driven by respiratory distress. Nebs changed to Xopenex. TSH low. Check free T3 and T4. Therapeutic enoxaparin for now. Echo if persistent DM type 2 (diabetes mellitus, type 2) ([facility] (10/18/2019): uncontrolled with hyperglycemia. Due to steroids. A1c 8.4. Continue NPH. SSi per protocol. DM diet Acute on chronic respiratory failure with hypoxia ([facility] (10/18/2019): due to COPD exacerbation. Continue supplemental oxygen and wean as tolerated to baseline. She says she only uses it at night Dyslipidemia (10/19/2019): continue Crestor Essential hypertension (10/18/2019): BP stable. No listed home BP medications. Monitor Total time spent for the patient's care: 35 Minutes Care Plan discussed with: Patient and Nursing Staff Discussed:  Care Plan and D/C Planning Prophylaxis:  Lovenox Anticipated Disposition:  Home w/Family 
        
___________________________________________________ Attending Physician:   Kendrick Wright MD

## 2019-10-20 NOTE — PROGRESS NOTES
Shift Summary 1930: Bedside and Verbal shift change report given to Debby Bauer RN (oncoming nurse) by Suad Dee RN (offgoing nurse). Report included the following information SBAR, Procedure Summary, Intake/Output, MAR, Accordion, Recent Results and Cardiac Rhythm Stach. 2010: Patient highly dyspneic this evening, does not feel much better after breathing treatment. Encouraging patient to sit up in bed, relax, take slow deep breaths with pursed lips. Administered meche, atarax, and mucinex 2030: Spoke to RT about suggestions for improving patient's dyspnea, WOB, tightness. Asked about different breathing treatments- patient  Most likely getting optimal regimen with up to q2 duonebs. RT recommends something to help her relax. Spoke to patient about possible need for BIPAP (explained what this is) d/t SOB, WOB. Patient states that she did not tolerate this in the past, she felt like she was suffocating, and would like to try to avoid this 2040: Talked to Dr. Portia Guo about patient being highly SOB, HR sustaining in 130s, elevated MEWS score. Discussed BIPAP and patient previously unable to tolerate. O2 saturations well maintianed. Per MD, give 2mg morphine now to help relax, lessen WOB. May give . 25 Xanex once prn if needed later to help. If these measures do not help, may need BIPAP. 2050: Administered 1mg morphine, holding xanex for now. Scheduled for RT to bring neb at 2130 (2 hrs from last) 2136: RT administered duoneb 2230: Patient reports feeling much better. Feels like her coughs are more productive, like she is less \"tight\" and less SOB. Gave night time solumedrol and Ambien for sleep 
 
0000: Patient sleeping soundly 0130: Patient has been resting. Says that she has been feeling very good and feels she has been breathing well. 0235: Patient woke up asking for neb, RT paged for albuterol.   
 
36: Patient continues to complain of SOB, tightness, is asking about her atrovent that she normally takes after albuterol. Called Dr. Gregorio Love, had duoneb q4 while awake changed to atrovent q4 and Xopenex q4, including now. RT paged to administer. RN placed ultrasound guided IV for CT, flushing well, will take down once breathing is controlled. 36: Took patient down to CT- IV functioning before transit. While testing IV, infiltrated with high powered saline flushes. Unable to obtain CT/CTA 
 
 
0730: Bedside and Verbal shift change report given to Suad Dee RN (oncoming nurse) by Amber Carmichael RN (offgoing nurse). Report included the following information SBAR, Kardex, Procedure Summary, Intake/Output, MAR, Accordion, Recent Results and Cardiac Rhythm Stach. Care Plan Summary Problem: Activity Intolerance Goal: *Oxygen saturation during activity within specified parameters 10/20/2019 0151 by James Villagomez Outcome: Progressing Towards Goal 
Note:  
O2 maintaining well on 2L NC 
 
Goal: *Able to remain out of bed as prescribed 10/20/2019 0151 by James Villagomez Outcome: Progressing Towards Goal 
Note:  
Still gets very PORTER with minimal activity (up to bedside commode) Goal: Patient/Family Education 10/20/2019 0151 by James Villagomez Outcome: Progressing Towards Goal 
 
 
Problem: Diabetes Self-Management Goal: *Disease process and treatment process Description Define diabetes and identify own type of diabetes; list 3 options for treating diabetes. 10/20/2019 0151 by James Villagomez Outcome: Progressing Towards Goal 
Goal: *Incorporating nutritional management into lifestyle Description Describe effect of type, amount and timing of food on blood glucose; list 3 methods for planning meals. 10/20/2019 0151 by James Villagomez Outcome: Progressing Towards Goal 
Goal: *Developing strategies to promote health/change behavior Description Define the ABC's of diabetes; identify appropriate screenings, schedule and personal plan for screenings. 10/20/2019 0151 by Jairon Irizarry Outcome: Progressing Towards Goal 
Goal: *Using medications safely Description State effect of diabetes medications on diabetes; name diabetes medication taking, action and side effects. 10/20/2019 0151 by Jairon Irizarry Outcome: Progressing Towards Goal 
Goal: *Monitoring blood glucose, interpreting and using results Description Identify recommended blood glucose targets  and personal targets. 10/20/2019 0151 by Jairon Irizarry Outcome: Progressing Towards Goal 
Goal: *Prevention, detection, treatment of acute complications Description List symptoms of hyper- and hypoglycemia; describe how to treat low blood sugar and actions for lowering  high blood glucose level. 10/20/2019 0151 by Jairon Irizarry Outcome: Progressing Towards Goal 
 
 
Problem: Falls - Risk of 
Goal: *Absence of Falls Description Document Jone Knutson Fall Risk and appropriate interventions in the flowsheet. 10/20/2019 0151 by Jairon Irizarry Outcome: Progressing Towards Goal 
Note:  
Fall Risk Interventions: 
Mobility Interventions: Patient to call before getting OOB, PT Consult for mobility concerns Medication Interventions: Patient to call before getting OOB, Evaluate medications/consider consulting pharmacy, Teach patient to arise slowly Elimination Interventions: Call light in reach, Toilet paper/wipes in reach, Urinal in reach History of Falls Interventions: Door open when patient unattended, Investigate reason for fall, Room close to nurse's station Problem: Pressure Injury - Risk of 
Goal: *Prevention of pressure injury Description Document Paul Scale and appropriate interventions in the flowsheet. 10/20/2019 0151 by Jairon Irizarry Outcome: Progressing Towards Goal 
Note:  
Pressure Injury Interventions: 
Moisture Interventions: Check for incontinence Q2 hours and as needed, Absorbent underpads Activity Interventions: Increase time out of bed, PT/OT evaluation Mobility Interventions: PT/OT evaluation Nutrition Interventions: Document food/fluid/supplement intake, Offer support with meals,snacks and hydration Friction and Shear Interventions: Apply protective barrier, creams and emollients Problem: Risk for Spread of Infection Goal: Prevent transmission of infectious organism to others Description Prevent the transmission of infectious organisms to other patients, staff members, and visitors. 10/20/2019 0151 by Kenan Pryor Outcome: Progressing Towards Goal 
 Awaiting MRSA Cx

## 2019-10-20 NOTE — PROGRESS NOTES
After speaking with the nurse and reading Dr. Pineda Kim note, the plan is to gain vascular access tomorrow and then do CTA CHEST. Please reorder CTA chest when IV access has been achieved.

## 2019-10-20 NOTE — PROGRESS NOTES
[facility] Name: Andria Brooks MRN: [id number] : 1945 Hospital: [address] [facility] Date: 10/20/2019 IMPRESSION:  
· Acute on chronic respiratory failure · COPD/ Asthma · May have some element volume overload · Unable to get Spiral CT due to poor IV access. V/Q scan will not be helpful due to her lung disease PLAN:  
· IV Lasix · Ewing to follow Is and Os · Empirically treat for PE due to high risk Subjective/Interval History:  
I have reviewed the flowsheet and previous days notes. She continues to be breathless. She was unable to get a Chest CT due to no IV access. She did not sleep much. She denies chest pain or hemoptysis. HR is still up. She is not eating much. ROS:A comprehensive review of systems was negative except for that written in the HPI. Objective:  
Vital Signs:   
Visit Vitals /47 (BP 1 Location: Right leg, BP Patient Position: At rest) Pulse (!) 126 Temp 98.7 °F (37.1 °C) Resp 23 Ht 5' 1\" (1.549 m) Wt 70.5 kg (155 lb 6.8 oz) SpO2 100% BMI 29.37 kg/m² O2 Device: Nasal cannula O2 Flow Rate (L/min): 2 l/min Temp (24hrs), Av.1 °F (36.7 °C), Min:97.4 °F (36.3 °C), Max:98.7 °F (37.1 °C) Intake/Output:  
Last shift:      10/20 0701 - 10/20 1900 In: -  
Out: 759 [SPOEP:816] Last 3 shifts: 10/18 1901 - 10/20 0700 In: 3908.3 [P.O.:2300; I.V.:1608.3] Out:  [Urine:] Intake/Output Summary (Last 24 hours) at 10/20/2019 1206 Last data filed at 10/20/2019 1001 Gross per 24 hour Intake 1413.33 ml Output 1600 ml Net -186.67 ml Physical Exam: 
 
General: [x]Ill appearing [] [] []No acute distress [] [] HEENT: []Icteric []PERRL []  
 [x]Anicteric Mucosa:[]moist   []dry [] Resp: [x]Wheeze []Clear to ascultation bilaterally []Accessory muscle use [x]Rales []Chest tube:  []Air leak [] []Ronchi []Crepitus []  
[] CV: [x]Regular []Tachycardia [x]tachy  
 []Irregular []Bradycardic []  
 []Murmur []S3 [] []Edema []S4 []  
  
GI: [x]Soft  []NG Tube Bowel sounds: [x]present []absent []Firm []PEG [] []Distended  []  
  
: []Ewing []Hematuria [] []Clear urine [x]Edema [] MSK:  
 []SCDs [x]Edema [] []No deformities [] []  
  
Skin: [x]Warm []Dry  [] []Cool []Moist [] []Hot  []Diaphoretic [] []Rash  []Cyanosis [] N-psych: []Sedated  [x]Oriented []Agitated  
 [x]Alert  Follows commands:  
[x]Yes  []No [] Devices: []PICC []Trach []Chest tube: Air leak? []Y/[]N  
 []Central Line []PA Catheter [] DATA: 
Labs: 
Recent Labs 10/19/19 
0340 10/18/19 
1426 WBC 3.6 8.4 HGB 10.2* 12.2 HCT 32.8* 38.6 * 170 Recent Labs 10/19/19 
0340 10/18/19 
1426 * 133* K 4.1 4.7  100 CO2 24 28 * 183* BUN 29* 24* CREA 1.25* 1.27* CA 7.7* 8.5 MG 1.8  --   
ALB  --  3.1* TBILI  --  0.6 SGOT  --  35 ALT  --  29 Recent Labs 10/18/19 
1650 PH 7.39  
PCO2 44 PO2 104* HCO3 26 Imaging: 
[x]I have personally reviewed the patients radiographs []Radiographs reviewed with radiologist 
 []No change from prior, tubes and lines in adequate position []Improved   []Worsening Nestor Tuttle MD, [facility]

## 2019-10-21 PROBLEM — B34.8 PARAINFLUENZA INFECTION: Status: ACTIVE | Noted: 2019-01-01

## 2019-10-21 NOTE — PROGRESS NOTES
Called by the nurse regarding pt's worsening breathing condition. Pt was evaluated at bedside. Pt is in respiratory distress and became tachycardic, HR reaching up to 180s. Pt had extensive expiratory wheezes. Pt was put on BiPAP, which she tolerated well and give  Will check ABG. Increased dose of solumedrol and a dose of morphine was given. Addendum: 22:40. Pt was re assessed. Doing much better. Off BiPAP without any distress. Advised to put her back on BiPAP as needed. Kalli Cassette spent from 20:30 to 21:10 Spent 35 minutes

## 2019-10-21 NOTE — PROGRESS NOTES
Shift Summary: 
1915: Bedside and Verbal shift change report given to Marvin Fernandez RN (oncoming nurse) by Suad Dee RN (offgoing nurse). Report included the following information SBAR, Kardex, Procedure Summary, Intake/Output, MAR, Accordion, Recent Results and Cardiac Rhythm Stach 120s-130s. 1940: Patient called out, asking for breathing treatment. Patient in respiratory distress, significant work of breathing at rest, says she feels like she is suffocating and this is the worst that she has felt. Very little air movement on auscultation, almost no breath sounds. Called RT for neb now, RT on way 1955: Patient receiving neb, says she does not feel better. Consulted w/ respiratory who believes neb will help with air movmement (pulmicort, Xopenex, and atrovent) and best thing would be for patient to have something for anxiety. Patient has been at 98% on 2L NC- no drop in O2 saturation. Discussed with Dr. Omi Contreras, received order for prn 0.25mg Xanex, give one dose now, if no improvement will place patient on BIPAP. 2015: Administered Becki Merle, and Mucinex. Peripheral IV infiltrated. 2030: Patient still feeling severe distress, increased WOB, accessory muscle use, diminished lung sounds, HR elevated to 140s. Called Dr. Omi Contreras for BIPAP orders, MD to come see patient 2050: MD with patient at bedside, ordered BIPAP, ABG, 125mg solumedrol and2mg morphine now. While MD at bedside, HR jumped as high as 189, sustained in 160s-180s for several minutes, returned to 140s. Charge nurse assisting primary RN, placed ultrasound guided IV in left AC. BIPAP started, morphine given once line placed. Patient tolerating BIPAP well at this time. HR sustaining in 140s. 2125: Went to administer solumedrol, ultrasound placed IV infiltrated.  Called MD to see if anesthesia could place central line tonight (anesthesia was in CCU), per MD, unlikely to be for a while d/t other hospital needs. If anesthesia can place line tonight, will procede with CT scan. Called RN supervisor for help placing line for meds in meantime. 2200: New peripheral line in R wrist. Patient requesting to come of BIPAP to drink water. Removed of mask, patient breathing much better. No longer in distress, breathing regular and even. After patient took break of 15 minutes, placed patient back on BIPAP. Called RT about ABGs, and RT said that they needed to get while patient was on BIPAP and to call after another continuous 30 minutes. 2240: Took patient back of BIPAP. Breathing well. Dr. Quin Ayala came back to bedside to reassess patient. He was pleased that patient looked so much better, said that she could be off BIPAP for now and use prn. Resp had not come to perform ABGs yet. Per MD, can cancel order for ABGs. Said he was NOT going to have anesthesia come tonight, and that central line and CT scan would wait until tomorrow morning. 2350: Called to room that patients HR spiked to 150s. Went to room to assess patient who was sitting in bed, having just finished a breathing treatment. Her RR was good, 18-22, though she said that she could feel her heart fluttering since her neb. Stayed in room with patient until HR down to 130s. 0010: HR sustaining in 130s despite to apparent respiratory distress. Had patient put on BIPAP to see if this would take the strain off of her heart. 3754: Talked to Dr. Quin Ayala regarding patient's HR sustaining in 130s up to 140, despite breathing much improved, patient without distress, RR 18. BIPAP not seeming to affect HR at this time. Per MD, as long as patient is asymptomatic, not feeling in distress, we will not do anything about the HR tonight. I pressed about further medications/interventions for HR control and MD confirmed he is aware of HR and has no further orders at this time. MD said to not worry about making patient stay on BIPAP if her breathing is fine 0045: Administered ambien and atarax 0130: Patient sleeping well. 0310: Patient woke up feeling SOB, increased WOB, asking for neb. Called RT for scheduled breathing treatment. Asked patient if she needed BIPAP, patient declines and says its not like before, she feels like she is getting air. Administered scheduled meche and solumedrol. 0340: Patient feeling much better after neb. Collected blood work. 0430: Patient sleeping well and without distress. 0510: Woke patient with patton care/bed weight. She is feeling well this morning, no anxiety, no trouble breathing. Saturating well on room air.  
 
1793: Notified by charge nurse that d/t sustained elevated HR, patient is being moved to CCU under stepdown for closer management. TRANSFER - OUT REPORT: 
 
Verbal report given to Yecenia Celaya RN(name) on Mary Coe  being transferred to [facility], CCU(unit) for routine progression of care Report consisted of patients Situation, Background, Assessment and  
Recommendations(SBAR). Information from the following report(s) SBAR, Kardex, Intake/Output, MAR, Accordion, Recent Results and Cardiac Rhythm Josefa Taylor was reviewed with the receiving nurse. Lines:  
Peripheral IV 10/20/19 Right Wrist (Active) Site Assessment Clean, dry, & intact 10/21/2019  3:43 AM  
Phlebitis Assessment 0 10/21/2019  3:43 AM  
Infiltration Assessment 0 10/21/2019  3:43 AM  
Dressing Status Clean, dry, & intact 10/21/2019  3:43 AM  
Dressing Type Transparent;Tape 10/21/2019  3:43 AM  
Hub Color/Line Status Pink;Patent; Flushed 10/21/2019  3:43 AM  
Alcohol Cap Used Yes 10/21/2019  3:43 AM  
  
 
Opportunity for questions and clarification was provided. Patient transported with: 
 Monitor O2 @ 2 liters Registered Nurse Tech 
 
 
9842: Adi Kirby, daughter in law, and notified of patient being moved to CCU for closer monitoring.

## 2019-10-21 NOTE — PROGRESS NOTES
0700- Bedside and Verbal shift change report given to Fritz Carty RN (oncoming nurse) by Mireya Rahman RN (offgoing nurse). Report included the following information SBAR, Kardex, MAR and Recent Results. Primary Nurse Braulio Jenkins and Lester Guillermo RN performed a dual skin assessment on this patient No impairment noted Vasc access at bedside to obtain an IV access 
 
0900- pt in CT. IV infiltrated per radiology tech. 1000- pt hypotensive, NAD noted. Pt sitting up in bed, watching TV.  
 
1028- attempted to call PICC team, radiology states they are making rounds on the units, left a message to have them call me, and that we need a midline in room 10. Attempted to call on [employer] with no answer. 80- spoke to tamiko with vascular access team who states he is going to talk to physician to see if a midline is still needed. Advised him that intensivist still wants a midline before CT order changed. Reassessment completed on this patient. 1200- pt does not want to make a decision on picc line until daughter is here. 1250- attempted to call vascular access with no answer. Daughter at bedside, wants to discuss with PICC team.  
 
1330- PICC team at bedside. 1400- PICC team remains at bedside. 1420- family at bedside. PICC line obtained. 1500- pt tachycardic andrew 190s, averaging 170s-180s. Pt having a BM states it is hard to have a BM, pt straining. 1530- spoke with Dr. Masoud Damon who ordered 2.5 metoprolol, and obtain EKG afterwards. EKG placed on chart. 1630- pt back from CT. Pt tolerated procedure well.  
 
1700- pt BG is 71, pt is symptomatic, pt eating dinner tray. Will recheck BG and monitor. 1730- BG recheck is 95, pt eating dinner. 1830- pt sleeping, NAD noted. 1900- Bedside and Verbal shift change report given to TITUS Daniel RN (oncoming nurse) by Fritz Carty RN (offgoing nurse).  Report included the following information SBAR, Kardex, Intake/Output, Recent Results and Cardiac Rhythm Sinus Tach.

## 2019-10-21 NOTE — PROGRESS NOTES
PICC Placement Note   5 FR Dual Lumen POWER PICC PRE-PROCEDURE VERIFICATION Correct Procedure: yes Correct Site:  yes Temperature: Temp: 97.5 °F (36.4 °C), Temperature Source: Temp Source: Oral 
Recent Labs 10/21/19 
4190 BUN 29* CREA 1.28*  WBC 7.9 Allergies: Chocolate [cocoa]; Demerol [meperidine]; Other plant, animal, environmental; and Tape [adhesive] Education materials for PICC Care given: yes. See Patient Education activity for further details. PICC Booklet placed at bedside: yes Closed Ended PICC Catheters: 
Flush Lumens as Follows: 
Intermittent Medication:   Flush before and after each medication with 10 ml NS. Unused Ports:  Flush every 8 hours with 10 ml NS. 
TPN Ports:  Flush every 24 hours with 20 ml NS prior to hanging new bag. Blood Draws: Stop infusion, draw off and waste 10 ml of blood. Draw sample with 10cc syringe or greater. DO NOT USE VACUTAINER . Transfer with appropriate device to lab  tubes. Flush with 20 ml NS. Dressing Change:  Every 7 days, and PRN using sterile technique if integrity of dressing is compromised. Initial dressing change for central line 24-48 hours post insertion if gauze is used. Apply new dressing per policy. PROCEDURE DETAIL Consent was obtained and all questions were answered related to risks and benefits. A double lumen PICC line was inserted, as a sterile procedure using ultrasound and modified Seldinger technique for vascular access. The following documentation is in addition to the PICC properties in the lines/airways flowsheet : 
Lot #: [id number] Lidocaine 1% administered intradermally :yes Internal Catheter Total Length: 44 (cm) Vein Selection for PICC:left basilic Central Line Bundle followed yes Complication Related to Insertion:no The placement was verified by EKG, MAX P WAVE @ 44 (cm). PER EKG PICC TIP @ C/A junction. X-ray: no.  
Line is okay to use: yes Poncho Herrera RN

## 2019-10-21 NOTE — PROGRESS NOTES
PICC Placement Note    5 FR Dual lumen POWER PICC PRE-PROCEDURE VERIFICATION Correct Procedure: yes Correct Site:  yes Temperature: Temp: 97.5 °F (36.4 °C), Temperature Source: Temp Source: Oral 
Recent Labs 10/21/19 
8105 BUN 29* CREA 1.28*  WBC 7.9 Allergies: Chocolate [cocoa]; Demerol [meperidine]; Other plant, animal, environmental; and Tape [adhesive] Education materials for PICC Care given to family: yes. See Patient Education activity for further details. PICC Booklet placed on chart: yes PROCEDURE DETAIL A double lumen PICC line was started for vascular access. The following documentation is in addition to the PICC properties in the lines/airways flowsheet : 
Lot #: [id number] 
xylocaine used: yes Mid-Arm Circumference: 28 (cm) Internal Catheter Length: 44 (cm) Internal Catheter Total Length: 0 (cm) Vein Selection for PICC:left basilic Central Line Bundle followed yes Complication Related to Insertion: none The placement was verified by X-ray: no.  
 
Line is okay to use: yes Poncho Herrera RN

## 2019-10-21 NOTE — PROGRESS NOTES
Case Management continues to follow pt for discharge needs. Pt is on droplet precautions for +parainfluenza. Pt is having a PICC line placed. I will follow up with pt in the morning regarding discharge needs.  
 
Marcia Bowman

## 2019-10-21 NOTE — PROGRESS NOTES
Myke Red [facility] [place] [address] 
[address] [facility], [address] [place] [address] 
[phone] Medical Progress Note NAME:         Keyana Coe :        1945 MRM:        [id number] Date:          10/21/2019 Subjective: Patient has been seen and examined as a follow up for multiple medical issues. Chart, labs, diagnostics reviewed. She remains very SOB, moderately severe and tachycardic. No fever or chills. Weak Objective: 
 
Vital Signs: 
 
Visit Vitals /44 Pulse (!) 132 Temp 97.7 °F (36.5 °C) Resp 18 Ht 5' 1\" (1.549 m) Wt 69.1 kg (152 lb 6.4 oz) SpO2 97% BMI 28.80 kg/m² Intake/Output Summary (Last 24 hours) at 10/21/2019 1137 Last data filed at 10/21/2019 1100 Gross per 24 hour Intake 960 ml Output 3250 ml Net -2290 ml Physical Examination: 
 
General:   Weak and ill looking female patient, dyspneic Eyes:   pink conjunctivae, PERRLA with no discharge. ENT:   no ottorrhea or rhinorrhea with dry mucous membranes Neck: no masses, thyroid non-tender and trachea central. 
Pulm:  ++ accessory muscle use, decreased breath sounds without crackles. ++ wheezes Card:  no JVD or murmurs, has sinus tachycardia, without thrills, bruits Abd:  Soft, non-tender, non-distended, normoactive bowel sounds Musc:  No cyanosis, clubbing, atrophy or deformities. Skin:  No rashes, bruising or ulcers. Neuro: Awake and alert. Generally a non focal exam.  
Psych:  Has a fair insight and is oriented x 3 Current Facility-Administered Medications Medication Dose Route Frequency  iopamidol (ISOVUE-370) 76 % injection  [START ON 10/22/2019] famotidine (PEPCID) tablet 20 mg  20 mg Oral DAILY  levalbuterol (XOPENEX) nebulizer soln 1.25 mg/3 mL  1.25 mg Nebulization Q4H RT  
 ipratropium (ATROVENT) 0.02 % nebulizer solution 0.5 mg  0.5 mg Nebulization Q4H RT  
  enoxaparin (LOVENOX) injection 70 mg  1 mg/kg SubCUTAneous Q12H  
 insulin NPH (NOVOLIN N, HUMULIN N) injection 25 Units  25 Units SubCUTAneous ACB&D  ALPRAZolam (XANAX) tablet 0.25 mg  0.25 mg Oral Q8H PRN  
 influenza vaccine 2019-20 (6 mos+)(PF) (FLUARIX/FLULAVAL/FLUZONE QUAD) injection 0.5 mL  0.5 mL IntraMUSCular PRIOR TO DISCHARGE  guaiFENesin ER (MUCINEX) tablet 1,200 mg  1,200 mg Oral Q12H  sorbitol 70 % solution 30 mL  30 mL Oral DAILY PRN  
 lisinopril (PRINIVIL, ZESTRIL) tablet 2.5 mg  2.5 mg Oral DAILY  sodium chloride (NS) flush 5-40 mL  5-40 mL IntraVENous Q8H  
 sodium chloride (NS) flush 5-40 mL  5-40 mL IntraVENous PRN  
 acetaminophen (TYLENOL) tablet 650 mg  650 mg Oral Q4H PRN  
 ondansetron (ZOFRAN) injection 4 mg  4 mg IntraVENous Q4H PRN  
 albuterol (PROVENTIL VENTOLIN) nebulizer solution 2.5 mg  2.5 mg Nebulization Q2H PRN  
 doxycycline (VIBRA-TABS) tablet 100 mg  100 mg Oral BID WITH MEALS  insulin lispro (HUMALOG) injection   SubCUTAneous QID WITH MEALS  glucose chewable tablet 16 g  4 Tab Oral PRN  
 glucagon (GLUCAGEN) injection 1 mg  1 mg IntraMUSCular PRN  
 dextrose 10% infusion 0-250 mL  0-250 mL IntraVENous PRN  
 methylPREDNISolone (PF) (SOLU-MEDROL) injection 60 mg  60 mg IntraVENous Q6H  
 arformoterol (BROVANA) neb solution 15 mcg  15 mcg Nebulization BID RT  
 budesonide (PULMICORT) 500 mcg/2 ml nebulizer suspension  500 mcg Nebulization BID RT  
 nicotine (NICODERM CQ) 14 mg/24 hr patch 1 Patch  1 Patch TransDERmal DAILY  hydrOXYzine HCl (ATARAX) tablet 25 mg  25 mg Oral QID PRN  
 rosuvastatin (CRESTOR) tablet 20 mg  20 mg Oral DAILY  theophylline ER (DAVID-24) capsule 200 mg  200 mg Oral Q6H  
 zolpidem (AMBIEN) tablet 5 mg  5 mg Oral QHS PRN Laboratory data and review: 
 
Recent Labs 10/21/19 
0328 10/19/19 
0340 10/18/19 
1426 WBC 7.9 3.6 8.4 HGB 11.0* 10.2* 12.2 HCT 33.6* 32.8* 38.6  138* 170  
 
 Recent Labs 10/21/19 
0328 10/19/19 
0340 10/18/19 
1426 * 135* 133* K 3.8 4.1 4.7  102 100 CO2 25 24 28 * 259* 183* BUN 29* 29* 24* CREA 1.28* 1.25* 1.27* CA 7.6* 7.7* 8.5 MG 1.8 1.8  --   
PHOS 2.2*  --   --   
ALB  --   --  3.1*  
SGOT  --   --  35 ALT  --   --  29 No components found for: [facility]: 
 
Telemetry reviewed by me:  Sinus tachycardia Chest X ray - unremarkable Assessment and Plan: COPD with acute exacerbation ([facility] (10/18/2019) / Hx  Lupus disease of lung ([facility] (10/19/2019): trigger likely parainfluenza infection. She still smokes. Unable to get CTA due to poor IV access. Continue enoxaparin pending imaging. Continue high dose IV Solumedrol, Pulmicort, Brovana, Doxycycline and theophylline. Pulmonology following. Sinus tachycardia POA: still persistent. Presumed to be driven by respiratory illness. On Xopenex. TSH low. Normal Free T4. Echo showed a normal Lv function with no RV strain. Mild Pulm hypertension. CTA once IV access obtained DM type 2 (diabetes mellitus, type 2) ([facility] (10/18/2019): uncontrolled with hyperglycemia. Due to steroids. A1c 8.4. Continue NPH. SSi per protocol. DM diet Acute on chronic respiratory failure with hypoxia ([facility] (10/18/2019): due to COPD exacerbation. Continue supplemental oxygen and wean as tolerated to baseline. She says she only uses it at night Dyslipidemia (10/19/2019): continue Crestor Essential hypertension (10/18/2019): BP stable. No listed home BP medications. Monitor Total time spent for the patient's care: 35 Minutes Care Plan discussed with: Patient and Nursing Staff Discussed:  Care Plan and D/C Planning Prophylaxis:  Lovenox Anticipated Disposition:  Home w/Family 
        
___________________________________________________ Attending Physician:   Eloisa Griggs MD

## 2019-10-21 NOTE — PROGRESS NOTES
Pharmacy Dosing Note Current order: Famotidine 20 mg PO BID for symptomatic GERD New order: Change to famotidine 20 mg PO daily for CrCl less than 50 mL/min per [facility] Renal Dosing Protocol. Estimated Creatinine Clearance: 34.8 mL/min (A) (based on SCr of 1.28 mg/dL (H)).  
 
Thank you, 
Obi Knox, PharmD, BCPS

## 2019-10-21 NOTE — PROGRESS NOTES
Nutrition Assessment: 
 
RECOMMENDATIONS/INTERVENTION(S):  
1. Consider puree consistency as pt reports difficulty chewing mechanical soft. Continue cardiac and CHO consistency. 2. Recommend Gelatein BID (180 kcal, 40 gm protein) to promote PO intake. 3. Monitor BG/FS and adjust insulin regimen as needed to maintain BG <180 mg/dL. 4. Monitor PO intake, GI function, labs, and weight trends. ASSESSMENT:  
10/21: 70 y/o F admitted with COPD exacerbation. Pt seen for LOS. PMH - asthma, COPD, DM, HTN, lupus. Pt reports reports good appetite but limited PO intake 2/2 difficulty chewing r/t missing teeth. Pt currently on mechanical soft diet, reports foods are still too tough. Many snacks at bedside. Meal intake per EMR shows 25-75% meal intake. Consider switching to puree consistency. Pt reports good appetite/ intake PTA.  lb. BMI 28.8 c/w overweight. No weight Hx per EMR. Pt reports food allergy to cinnamon and chocolate. Hgb A1c 8.4 H. BG/, 190, 235, 115; on insulin regimen. Pt denies n/v/c/d. LBM PTA. Skin without PI. Labs -  Na 135 L, BUN 29 H, Cr 1.28 H, Ca 7.6 H, Phos 2.2 L. Meds - budesonide, insulin lispro, insulin NPH, lisinopril, methylprednisolone. Diet Order: Cardiac, Consistent carb, Mechanical soft 
% Eaten:   
Patient Vitals for the past 72 hrs: 
 % Diet Eaten 10/20/19 1640 25 % 10/20/19 1200 25 % 10/20/19 0751 75 % 10/19/19 1759 25 % 10/19/19 1246 25 % 10/19/19 1009 25 % Pertinent Medications: [x] Reviewed Labs: [x] Reviewed Anthropometrics: Height: 5' 1\" (154.9 cm) Weight: 69.1 kg (152 lb 6.4 oz) IBW (%IBW):   ( ) UBW (%UBW):   (  %) BMI: Body mass index is 28.8 kg/m². This BMI is indicative of: 
 [] Underweight    [] Normal    [x] Overweight    []  Obesity    []  Extreme Obesity (BMI>40) Estimated Nutrition Needs (Based on): 1567 Kcals/day(REE 1133 x 1.3) , 75 g(69 - 83 (1.0 - 1.2 gm/kg)) Protein Carbohydrate: At Least 130 g/day  Fluids: 1473 mL/day (1 ml/kcal) Last BM:  PTA  [x]Active     []Hyperactive  []Hypoactive       [] Absent   BS Skin:  No PI  [] Intact   [] Incision  [] Breakdown   [] DTI   [] Tears/Excoriation/Abrasion  []Edema [] Other: Wt Readings from Last 30 Encounters:  
10/21/19 69.1 kg (152 lb 6.4 oz) NUTRITION DIAGNOSES:  
Problem:  Chewing/masticatory difficulty Etiology: related to partial/ complete edentulism Signs/Symptoms: as evidenced by pt reports difficulty chewing 2/2 missing teeth NUTRITION INTERVENTIONS: 
Meals/Snacks: General/healthful diet   Supplements: Commercial supplement GOAL:  
PO intake >50% + ONS within 3-5 days Cultural, Mandaen, or Ethnic Dietary Needs: None EDUCATION & DISCHARGE NEEDS:  
 [x] None Identified 
 [] Identified and Education Provided/Documented 
 [] Identified and Pt declined/was not appropriate [x] Interdisciplinary Care Plan Reviewed/Documented  
 [x] Discharge Needs:    CHO consistent/ cardiac/ dental soft 
 [] No Nutrition Related Discharge Needs NUTRITION RISK:  
Pt Is At Nutrition Risk  [x] No Nutrition Risk Identified  [] PT SEEN FOR:  
 []  MD Consult: []Calorie Count []Diabetic Diet Education []Diet Education []Electrolyte Management []General Nutrition Management and Supplements []Management of Tube Feeding []TPN Recommendations []  RN Referral:  []MST score >=2 
   []Enteral/Parenteral Nutrition PTA []Pregnant: Gestational DM or Multigestation  
              [] Pressure Ulcer 
 
[]  Low BMI      [x]  Length of Stay       [] Dysphagia Diet         [] Ventilator 
[]  Follow-up Previous Recommendations: 
 [] Implemented          [] Not Implemented          [x] Not Applicable Previous Goal: 
 [] Met              [] Progressing Towards Goal              [] Not Progressing Towards Goal   [x] Not Applicable George Salmeron RDN 
 Pager [phone] Phone [phone]

## 2019-10-21 NOTE — PROGRESS NOTES
[facility] Name: Sherman Lynne MRN: [id number] : 1945 Hospital: [address] [facility] Date: 10/21/2019 IMPRESSION:  
· Acute on chronic respiratory failure · COPD/ Asthma exacerbation due to parainfluenza · Parainfluenza + PLAN:  
· IV steroids · Pulmicort/Brovana · xopenex · atrovent · Theophylline level wnl · Doxy · Mucinex · BG control · External patton · Empirically treat for PE - pending CTA once picc line established. Discussed coordination of care with vascular access team.   
 
Subjective/Interval History:  
I have reviewed the flowsheet and previous days notes. Overnight events: 
Continous to feel unwell and breathless. Parainfluenza + HR in the 140s PIV continous to blow and CTA attempted this AM but IV infiltrated. Objective:  
Vital Signs:   
Visit Vitals /44 Pulse (!) 132 Temp 97.7 °F (36.5 °C) Resp 18 Ht 5' 1\" (1.549 m) Wt 69.1 kg (152 lb 6.4 oz) SpO2 97% BMI 28.80 kg/m² O2 Device: Room air O2 Flow Rate (L/min): 2 l/min Temp (24hrs), Av °F (36.7 °C), Min:97.6 °F (36.4 °C), Max:98.8 °F (37.1 °C) Intake/Output:  
Last shift:      10/21 07 - 10/21 1900 In: -  
Out: 200 [Urine:200] Last 3 shifts: 10/19 1901 - 10/21 07 In: 1640 [P.O.:1640] Out: 3002 [EOUYQ:0642] Intake/Output Summary (Last 24 hours) at 10/21/2019 1209 Last data filed at 10/21/2019 1100 Gross per 24 hour Intake 480 ml Output 3250 ml Net -2770 ml Physical Exam: 
 
General: [x]Ill appearing [] [] []No acute distress [] [] HEENT: []Icteric []PERRL []  
 [x]Anicteric Mucosa:[]moist   []dry [] Resp: [x]Wheeze []Clear to ascultation bilaterally []Accessory muscle use [x]Rales []Chest tube:  []Air leak [] []Ronchi []Crepitus []  
[]  
  
CV: [x]Regular []Tachycardia [x]tachy  
 []Irregular []Bradycardic []  
 []Murmur []S3 [] []Edema []S4 []  
  
GI: [x]Soft  []NG Tube Bowel sounds: [x]present []absent []Firm []PEG [] []Distended  []  
  
: []Ewing []Hematuria [] []Clear urine [x]Edema [] MSK:  
 []SCDs [x]Edema [] []No deformities [] []  
  
Skin: [x]Warm []Dry  [] []Cool []Moist [] []Hot  []Diaphoretic [] []Rash  []Cyanosis [] N-psych: []Sedated  [x]Oriented []Agitated  
 [x]Alert  Follows commands:  
[x]Yes  []No [] Devices: []PICC []Trach []Chest tube: Air leak? []Y/[]N  
 []Central Line []PA Catheter [] DATA: 
Labs: 
Recent Labs 10/21/19 
0328 10/19/19 
0340 10/18/19 
1426 WBC 7.9 3.6 8.4 HGB 11.0* 10.2* 12.2 HCT 33.6* 32.8* 38.6  138* 170 Recent Labs 10/21/19 
0328 10/19/19 
0340 10/18/19 
1426 * 135* 133* K 3.8 4.1 4.7  102 100 CO2 25 24 28 * 259* 183* BUN 29* 29* 24* CREA 1.28* 1.25* 1.27* CA 7.6* 7.7* 8.5 MG 1.8 1.8  --   
PHOS 2.2*  --   --   
ALB  --   --  3.1* TBILI  --   --  0.6 SGOT  --   --  35 ALT  --   --  29 Recent Labs 10/18/19 
1650 PH 7.39  
PCO2 44 PO2 104* HCO3 26 Imaging: 
[x]I have personally reviewed the patients radiographs []Radiographs reviewed with radiologist 
 []No change from prior, tubes and lines in adequate position []Improved   []Worsening All Robles MD 
[facility]

## 2019-10-21 NOTE — PROGRESS NOTES
Assisting primary RN, new PIV inserted right lateral wrist with one attempt. Blood return verified and flush confirmed without resistance.

## 2019-10-22 NOTE — DIABETES MGMT
[facility] Progress Note Recommendations/ Comments:  
Chart reviewed for variable BG, noting recurrent hypoglycemia from 6/21/19 1700 until this morning, with  mg/dL. Noted patient received NPH 25 units at 1723; BG recheck at 1732 was 95 mg/dL. Recurrent hypoglycemia likely due to duration of NPH. Patient remains on IV steroids. Noted NPH dose has been decreased, and will resume at 1600 today. If appropriate, please consider: 1. Stop D10 as BG now above 200 mg/dL, and NPH was last administered 17 hours ago. 2. Consider placing hold parameters on NPH based on BG, as patient with poor appetite. Current hospital DM medication:  
Lispro normal sensitivity correction scale NPH 10 units BID meals, to start at 1600 Chart reviewed on [facility]. Patient is a 69 y.o. female with known Type 2 Diabetes on oral agent (monotherapy): Januvia 100 mg daily 
insulin injections: Lantus : 14-16 units daily at home. A1c:  
Lab Results Component Value Date/Time Hemoglobin A1c 8.4 (H) 10/18/2019 08:56 PM  
 
 
Recent Glucose Results:  
Lab Results Component Value Date/Time  (H) 10/22/2019 09:25 AM  
 GLUCPOC 238 (H) 10/22/2019 07:52 AM  
 GLUCPOC 48 (LL) 10/22/2019 04:01 AM  
 GLUCPOC 54 (L) 10/22/2019 01:52 AM  
  
 
Lab Results Component Value Date/Time Creatinine 1.65 (H) 10/22/2019 09:25 AM  
 
Estimated Creatinine Clearance: 27 mL/min (A) (based on SCr of 1.65 mg/dL (H)). Active Orders Diet DIET CARDIAC Mechanical Soft; Consistent Carb 1800kcal  
  
 
PO intake:  
Patient Vitals for the past 72 hrs: 
 % Diet Eaten 10/20/19 1640 25 % 10/20/19 1200 25 % 10/20/19 0751 75 % 10/19/19 1759 25 % 10/19/19 1246 25 % 10/19/19 1009 25 % Will continue to follow as needed. Thank you Fredonia Dance, RN Office [phone] Pager [phone] Time spent: 7 min

## 2019-10-22 NOTE — PROGRESS NOTES
Spoke to Chacho who confirmed retroperitoneal bleed. Discussed case with Dr. eLila Kovacs who will bring her down for angio. Due to the friable and calcified nature of her vessels embolization may be difficult and would prefer BP be somewhat stabilized before bringing down. Pt currently on Srini 100, Levophed added, stat uncrossed blood ordered and a liter of fluids. Family updated at bedside. cctime 36 min outside procedures.

## 2019-10-22 NOTE — PROGRESS NOTES
Bedside and Verbal shift change report given to Sesar Marshall, RN (oncoming nurse) by Zahra Livingston RN (offgoing nurse). Report included the following information SBAR, Kardex, Intake/Output, MAR, Recent Results and Med Rec Status. 1945- Pt c/o SOB. Expiratory wheezing noted bilaterally. Respiratory notified, requested breathing treatment. Pt O2 sats remain >95%. Pt becoming increasingly anxious. PRN Xanax administered. Will continue to monitor. 2017- Pt states that she is feeling as if he BS is low. BS 57. Given IM glucagon and glucose tablets. 2040- Pt BS 80, pt states that she is \"feeling a little bit better\" Will continue to monitor. 2100- BS currently 120. Pt resting quietly in bed. CHG bath given. Will continue to monitor. 2230- Pt HR in 170s, asymptomatic, watching tv quietly in bed. PRN Metoprolol given, Dr. Thu Adler notified, instructed to continue to monitor. 2300- Returned to patient room to reassess blood sugar. Currently 42, pt skin warm and clammy, however, pt states she feels fine 250 ml D10 adminitered. 2330- Pt has 4 repeat runs of v-tach. Aymptomatic A&Ox 4. Spoke with Dr. Thu Adler, Mg and K with am labs. 2350- Repeat .  
 
0150- Pt repeat BS 54. Dr. Thu Adler notified. Updated on pt labs and hypoglycemia. Received new orders, see MAR. 
 
0230-  Pt having multiple runs of V-Tach. 12 lead EKG taken, however, shows Sinus tachycardia at this time. A&Ox 4, asymptomatic, spoke with Dr. Thu Adler, received new orders, see MAR.  
 
0345- Pt HR in 170s, PRN Metoprolol given, asymptomatic, resting quietly in bed. Will continue to monitor. 0410- Pt BS 42. Pt is warm and clammy and states she does \"not feel well\", A&Ox 4. Spoke with Dr. Thu Adler extensively about the events of tonight including tachycardia, repetitive runs of v-tach, low phosphorus and hypoglycemia. Received new orders, will continue to monitor. 2315- Pt c/o 10/10 pain to her back and right side.  States that she usually takes Oxycodone for it. Spoke with , received new orders, see MAR.  
 
0- Spoke with Dr. Bates Plan about events of last night and recent pain and hypotension, received new orders for 500ml bolus. Report given to Marcio Anderson RN.

## 2019-10-22 NOTE — PROGRESS NOTES
Bedside and Verbal shift change report given to Cait Bocanegra RN (oncoming nurse) by Pat Larios RN (offgoing nurse). Report included the following information SBAR, Kardex, MAR and Cardiac Rhythm Snius Tach.  
 
0700: Patient c/o pain to right lower back radiating throughout her abdomen. Patient stated that she has been in pain for \"hours and hours. \"  Heart rate in the 120s - 130s. SBP <90.  
0730: Dr. Darling Hsu informed and in to see patient. Cardiology consulted. 500cc NS bolus administered. 2079: Hydrocodone-acetaminophen 7.5-325 mg administered. Alprazolam 0.25 mg administered. 0930: Dr. Jerri Hopkins in to see patient. Ecchymosis to abdomen noted. Phenylephrine started at 30 mcg/min. 500 cc NS bolus administered. Labs down for processing. 1000: Patient down to CT with this nurse. 1032: Increased Phenylephrine to 50 mcg/min per Dr. Jerri Hopkins orders. 1050: Phenylephrine increased to 70 mcg/min. 1100: Bedside and Verbal shift change report given to Rosa M Leo RN (oncoming nurse) by Cait Bocanegra RN (offgoing nurse). Report included the following information SBAR, Kardex, MAR and Cardiac Rhythm Sinus Tach.

## 2019-10-22 NOTE — PROGRESS NOTES
[facility] 79 
380 [facility], [address] [place] [address] 
[phone] Medical Progress Note NAME:         Mika Lopez :        1945 MRM:        [id number] Date:          10/22/2019 Subjective: Patient has been seen and examined as a follow up for multiple medical issues. Chart, labs, diagnostics reviewed. She remains SOB, moderately severe with persistent tachycardic. Has right flank discomfort but no fever or chills. No nausea or vomiting. Objective: 
 
Vital Signs: 
 
Visit Vitals BP (!) 86/66 (BP 1 Location: Right arm, BP Patient Position: At rest) Pulse (!) 125 Temp 97.6 °F (36.4 °C) Resp 22 Ht 5' 1\" (1.549 m) Wt 69.1 kg (152 lb 6.4 oz) SpO2 100% BMI 28.80 kg/m² Intake/Output Summary (Last 24 hours) at 10/22/2019 5362 Last data filed at 10/22/2019 2060 Gross per 24 hour Intake 4016.67 ml Output 1200 ml Net 2816.67 ml Physical Examination: 
 
General:   Weak and ill looking female patient, dyspneic and uncomfortable Eyes:   pink conjunctivae, PERRLA with no discharge. ENT:   no ottorrhea or rhinorrhea with dry mucous membranes Neck: no masses, thyroid non-tender and trachea central. 
Pulm:  ++ accessory muscle use, decreased breath sounds without crackles. ++ wheezes Card:  no JVD or murmurs, has sinus tachycardia, without thrills, bruits Abd:  Soft, non-distended, normoactive bowel sounds. No organomegally Musc:  No cyanosis, clubbing, atrophy or deformities. Skin:  No rashes, bruising or ulcers. Neuro: Awake and alert. Generally a non focal exam.  
Psych:  Has a fair insight and is oriented x 3 Current Facility-Administered Medications Medication Dose Route Frequency  dextrose 5% infusion  75 mL/hr IntraVENous CONTINUOUS  
 [START ON 10/23/2019] enoxaparin (LOVENOX) injection 40 mg  40 mg SubCUTAneous DAILY  [START ON 10/23/2019] theophylline ER (DAVID-24) capsule 200 mg  200 mg Oral Q24H  
 famotidine (PEPCID) tablet 20 mg  20 mg Oral DAILY  alteplase (CATHFLO) 1 mg in sterile water (preservative free) 1 mL injection  1 mg InterCATHeter PRN  
 metoprolol (LOPRESSOR) injection 2.5 mg  2.5 mg IntraVENous Q6H PRN  
 levalbuterol (XOPENEX) nebulizer soln 1.25 mg/3 mL  1.25 mg Nebulization Q4H RT  
 ipratropium (ATROVENT) 0.02 % nebulizer solution 0.5 mg  0.5 mg Nebulization Q4H RT  
 insulin NPH (NOVOLIN N, HUMULIN N) injection 25 Units  25 Units SubCUTAneous ACB&D  ALPRAZolam (XANAX) tablet 0.25 mg  0.25 mg Oral Q8H PRN  
 influenza vaccine 2019-20 (6 mos+)(PF) (FLUARIX/FLULAVAL/FLUZONE QUAD) injection 0.5 mL  0.5 mL IntraMUSCular PRIOR TO DISCHARGE  guaiFENesin ER (MUCINEX) tablet 1,200 mg  1,200 mg Oral Q12H  sorbitol 70 % solution 30 mL  30 mL Oral DAILY PRN  
 sodium chloride (NS) flush 5-40 mL  5-40 mL IntraVENous Q8H  
 sodium chloride (NS) flush 5-40 mL  5-40 mL IntraVENous PRN  
 acetaminophen (TYLENOL) tablet 650 mg  650 mg Oral Q4H PRN  
 ondansetron (ZOFRAN) injection 4 mg  4 mg IntraVENous Q4H PRN  
 albuterol (PROVENTIL VENTOLIN) nebulizer solution 2.5 mg  2.5 mg Nebulization Q2H PRN  
 doxycycline (VIBRA-TABS) tablet 100 mg  100 mg Oral BID WITH MEALS  insulin lispro (HUMALOG) injection   SubCUTAneous QID WITH MEALS  glucose chewable tablet 16 g  4 Tab Oral PRN  
 glucagon (GLUCAGEN) injection 1 mg  1 mg IntraMUSCular PRN  
 dextrose 10% infusion 0-250 mL  0-250 mL IntraVENous PRN  
 methylPREDNISolone (PF) (SOLU-MEDROL) injection 60 mg  60 mg IntraVENous Q6H  
 arformoterol (BROVANA) neb solution 15 mcg  15 mcg Nebulization BID RT  
 budesonide (PULMICORT) 500 mcg/2 ml nebulizer suspension  500 mcg Nebulization BID RT  
 nicotine (NICODERM CQ) 14 mg/24 hr patch 1 Patch  1 Patch TransDERmal DAILY  hydrOXYzine HCl (ATARAX) tablet 25 mg  25 mg Oral QID PRN  
  rosuvastatin (CRESTOR) tablet 20 mg  20 mg Oral DAILY  zolpidem (AMBIEN) tablet 5 mg  5 mg Oral QHS PRN Laboratory data and review: 
 
Recent Labs 10/21/19 
1835 WBC 7.9 HGB 11.0*  
HCT 33.6*  
 Recent Labs 10/21/19 
2345 10/21/19 
8047 NA  --  135* K 3.5 3.8 CL  --  101 CO2  --  25  
GLU  --  190* BUN  --  29* CREA  --  1.28* CA  --  7.6*  
MG 1.8 1.8 PHOS 2.4* 2.2* No components found for: [facility]: 
 
Telemetry reviewed by me:  Sinus tachycardia Chest X ray - unremarkable Assessment and Plan: COPD with acute exacerbation ([facility] (10/18/2019) / Hx  Lupus disease of lung ([facility] 75.) (10/19/2019): trigger likely parainfluenza infection + continued tobacco use. CTA chest neg for PE. RVP + for parainfluenza 1. Continue high dose IV Solumedrol, Pulmicort, Brovana, Doxycycline, Mucinex and theophylline. Pulmonology following. Sinus tachycardia POA: still persistent with episodic SVT + now hypotension. Presumed to be driven by respiratory illness. On Xopenex. TSH low. Normal Free T4. Echo showed a normal LV function with no RV strain. Mild Pulm hypertension. CTA neg for PE. Theophylline levels therapeutic. IV fluid challenge. Consult cardiology DM type 2 (diabetes mellitus, type 2) ([facility] 75.) (10/18/2019): uncontrolled but now with hypoglycemia. A1c 8.4. Decrease NPH dose, SSi per protocol. DM diet Acute on chronic respiratory failure with hypoxia ([facility] (10/18/2019): due to COPD exacerbation + viral URI. Continue supplemental oxygen and wean as tolerated to baseline. She says she only uses it at night Dyslipidemia (10/19/2019): continue Crestor Essential hypertension (10/18/2019): BP now low. IV fluid bolus for now. Cardiology to eval for tachycardia. Monitor Total time spent for the patient's care: 35 Minutes Care Plan discussed with: Patient and Nursing Staff, cardiology and intensivist 
 
 Discussed:  Care Plan and D/C Planning Prophylaxis:  Lovenox Anticipated Disposition:  Home w/Family 
        
___________________________________________________ Attending Physician:   Wilian Cabrera MD

## 2019-10-22 NOTE — PROGRESS NOTES
1630- Taking over care while Primary RN Lenin Ledesma takes break. Received patient AAOx4, able to make needs known laying flat in bed, left groin site C/D/I from cath. Respirations even easy unlabored. 3L PM via nasal cannula noted and tolerating well. Call bell in reach, bed locked in lowest position for safety. Multiple vasopressors running for blood pressure stability, and IV bolus running as ordered. Adjusting medications as needed. Family at bedside 1645- Cath site assessed- no bleeding/hematoma noted. 1650- [facility] and made aware of adjustments made to vasopressors.

## 2019-10-22 NOTE — PROGRESS NOTES
1030- Bedside and Verbal shift change report given to [facility]  (oncoming nurse) by Lizeth Mueller (offgoing nurse). Report included the following information SBAR, Kardex, Procedure Summary, Intake/Output, Recent Results and Cardiac Rhythm Sinustach. Primary Nurse Robel Brady and Los mark, RN performed a dual skin assessment on this patient Impairment noted- see wound doc flow sheet Paul score is see flow sheet. Assessment completed, see flow sheet for full assessment. Patient is alert and oriented x 4 with periodic confusion.  equal, weak. Pupils sluggish, but equal and reactive to light. Heart rate regular, sinus tach on monitor. BP low, on Srini at. Dr. Debi Bence updated. Lungs diminshed, on 4 L NC. Bowel sounds hypoactive. Abd is tender to tough. Pt complains of nausea. Large bruise on abd. Skin otherwise intact with decreased blanchable site on sacrum. Extremities cool, pulses palpable. Patton in place. 1118- Emergent blood transfusion of 1 unit PRBC via pressure bag. Dual Verification with LINUS Colin. 
 
1128- Updated Dr. Debi Bence for increase dosage for levophed and srini. Orders to increase srini to 300 and levophed to 200. 
 
1130- Orders per Dr. Debi Bence for 2 more units PRBC to be given emergently via pressure bag and to recheck CBC after second unit has completed. 1200- rectal temperature probe inserted after difficulty obtaining oral or axillary temperatures. No temp probe on patton. 1212- 1st unit PRBC completed. 1220- 2nd unit started via pressure bag. Dual verification with Maryam Joyce. 1310- IR team present at bedside to bring patient to cath lab. 1318- 2nd unit completed. 1319- 3rd unit started emergently via pressure bag on transport to IR. Dual verification with Bina BARRIGA and Los mark, RN.  
 
6844- Patient arrived in cath lab. 1410- Levophed and Srini synephrine titrated and bag changed per Yvette ARGUELLES RN. 
 
3477- Sheath pulled, angioseal used at L groin site. 1438- Patient returned to unit. VS obtained. Pt complaining of 8/10 abd/back pain. Dr. Ponce Signs updated, stated will order meds when BP more stable. 200- Dr. Ponce Signs present at bedside assessing patient. Orders to increase Levophed to 24 mcg.  
 
1500- BP labile, titrating vasopressors as needed. 1520- Reassessment completed, see flow sheet for changes. 1540- Labs drawn and sent. 1620- Call from lab with critical result, calcium 5.1, see flow sheet. 1645- Dr. Ponce Signs updated on critical lab, orders for 1 g calcium gluconate now. Updated on decreasing urine output, no new orders at this time. 1700- Orders from Dr. Ponce Signs, pt to remain NPO at this time, change route of doxycycline from PO to IV.  
 
1745- Dr. Ponce Signs updated on patient increased tachycardia and increased pressor supoort requirements. Stated to continue to monitor BP a this time. Orders for ABG. 1755- Joelle RT present at bedside for ABG. 1800- orders from Dr. Ponce Signs for vasopressin 0.04, continuous, non titratable. 1807- Dr. Ponce Signs updated with ABG results. One amp of bicarb ordered and orders to change continuous IV fluids to bicarb drip. 1842- Fluids changed, meds given per MAR. 
 
1900- Bedside and Verbal shift change report given to LINUS Brown (oncoming nurse) by Eagle Westfall (offgoing nurse). Report included the following information SBAR, Kardex, Intake/Output, MAR and Recent Results.

## 2019-10-22 NOTE — PROGRESS NOTES
Pt remains critically ill now with a retroperitoneal bleed. Per cardiologist,pt has had persistent ST with runs of SVT/NSVT. I am following pt for future disposition needs. Family has been in and out visiting today with support provided to pt.'s family.  
 
Carmine Valerio

## 2019-10-22 NOTE — PROGRESS NOTES
[facility] Name: Andria Brooks MRN: [id number] : 1945 Hospital: [address] [facility] Date: 10/22/2019 IMPRESSION:  
· Acute on chronic respiratory failure · COPD/ Asthma exacerbation due to parainfluenza · Parainfluenza + PLAN:  
· Fluid boluses · Srini · Stop enoxaparin · Stat CT abdomen w contrast 
· Continue IV steroids · Pulmicort/Brovana · xopenex · atrovent · Theophylline level wnl- On Extended release, but taking every 6 hrs at home. Will change it to daily at this point. · Continue Doxy · Mucinex · BG control · External patton · Updated daughter Subjective/Interval History:  
I have reviewed the flowsheet and previous days notes. Overnight events: This morning complaining of excrutiating back/flank pain and BP dropped to the 50s. Given 500cc with only mild improvement Had atrial tachycardia yesterday afternoon and overnight with HR up to the 180s. PICC placed successfully yesterday CTA chest negative for PE 
 
Objective:  
Vital Signs:   
Visit Vitals BP (!) 86/66 (BP 1 Location: Right arm, BP Patient Position: At rest) Pulse (!) 125 Temp 97.6 °F (36.4 °C) Resp 22 Ht 5' 1\" (1.549 m) Wt 69.1 kg (152 lb 6.4 oz) SpO2 100% BMI 28.80 kg/m² O2 Device: Nasal cannula O2 Flow Rate (L/min): 3 l/min Temp (24hrs), Av.8 °F (36.6 °C), Min:97.3 °F (36.3 °C), Max:98.5 °F (36.9 °C) Intake/Output:  
Last shift:      No intake/output data recorded. Last 3 shifts: 10/20 1901 - 10/22 0700 In: 4016.7 [I.V.:4016.7] Out: 2300 [[id number]] Intake/Output Summary (Last 24 hours) at 10/22/2019 3500 Last data filed at 10/22/2019 3311 Gross per 24 hour Intake 4016.67 ml Output 1200 ml Net 2816.67 ml Physical Exam: 
 
General: [x]Ill appearing [] [] []No acute distress [] [] HEENT: []Icteric []PERRL []  
 [x]Anicteric Mucosa:[]moist   []dry [] Resp: [x]Wheeze []Clear to ascultation bilaterally []Accessory muscle use [x]Rales []Chest tube:  []Air leak [] []Ronchi []Crepitus []  
[]  
  
CV: [x]Regular []Tachycardia [x]tachy  
 []Irregular []Bradycardic []  
 []Murmur []S3 [] []Edema []S4 []  
  
GI: [x]Soft  []NG Tube Bowel sounds: [x]present []absent []Firm []PEG [] []Distended  []  
  
: []Ewing []Hematuria [] []Clear urine [x]Edema [] MSK:  
 []SCDs [x]Edema [] []No deformities [] []  
  
Skin: [x]Warm []Dry  [] []Cool []Moist [] []Hot  []Diaphoretic [] []Rash  []Cyanosis [] N-psych: []Sedated  [x]Oriented []Agitated  
 [x]Alert  Follows commands:  
[x]Yes  []No [] Devices: []PICC []Trach []Chest tube: Air leak? []Y/[]N  
 []Central Line []PA Catheter [] DATA: 
Labs: 
Recent Labs 10/21/19 
6908 WBC 7.9 HGB 11.0*  
HCT 33.6*  
 Recent Labs 10/21/19 
2345 10/21/19 
3251 NA  --  135* K 3.5 3.8 CL  --  101 CO2  --  25  
GLU  --  190* BUN  --  29* CREA  --  1.28* CA  --  7.6*  
MG 1.8 1.8 PHOS 2.4* 2.2* No results for input(s): PH, PCO2, PO2, HCO3, FIO2 in the last 72 hours. Imaging: 
[x]I have personally reviewed the patients radiographs []Radiographs reviewed with radiologist 
 []No change from prior, tubes and lines in adequate position []Improved   []Worsening Tristian Davenport MD 
[facility]

## 2019-10-22 NOTE — CONSULTS
Antonio Rodriguez, [place] [address] Office [phone] Date of  Admission: 10/18/2019  1:48 PM 
  
 
Assessment/Plan: · Persistent sinus tachycardia with runs of SVT/ NSVT: No PE by CT 10/21/19. EF nml by ECHO 10/20/19. HR exacerbated by pain, anxiety and underlying resp status. Replete electrolytes to keep K > 4, Mg+ > 2. Would use prn BB · Acute resp failure: + parainfluenza virus: per pulmonary · COPD exacerbation : per pulmonary . Theophylline dose has been reduced to every day. Nebs changed to Xopanex. · HTN: hypotensive this am , requiring Srini. · New onset R side abd pain, R flank pain: ? Bleed given acute onset pain and hypotension. CT abdomen pending. Stat CBC, BMP · Dyslipidemia: on statin · T 2 DM: per attending · DVT prophylaxis: stopping lovenox as concern for bleeding. Thank you for allowing us to participate in Nancy Ann care. We will be happy to follow along. Please call for any questions. Consult requested by Althea Jesus MD for COPD exacerbation [facility]) [J44.1] Subjective: 
Nancy Ann is a 69 y.o.   female  with PMH of  COPD, lupus of the lung, HTN, T 2 DM, dyslipidemia who was admitted with SOB. Patient reports SOB since earlier this month. Was admitted to [facility] on 10/4 for SOB due to COPD exacerbation. Was discharged home after stay and felt improved. Was home for a week and developed SOB again 2-3 days ago. Worse with minimal exertion. Has oxygen at home to use nightly, but started using during the day as well. Minimal improvement with nebs and inhaler at home. Still smokes tobacco.  Was seen at [facility] and admitted but left AMA today and went home. At home felt worse, so brought to ED. Had been taking theophylline extended release 200 mg Q 6 hours at home. Isauro level on admission 18.8 Cardiology was consulted for elevated HR. Noted  on admission EKG. IV BB initiated yesterday for tachycardia with improvement in HR. No previous cardiology testing per pt. This am pt c/o severe R flank and abdominal pain.  
  
 
The patient denies chest pain, diaphoresis, PORTER, orthopnea, palpitations, PND, shortness of breath, claudication or syncope. No bleeding. Cardiac risk factors   
HTN  
DM   
Smoking Dyslipidemia Post menopausal female LABS:   Troponin:   0.05     ProBNP:   367 Cardiographics:   Telemetry: ST    
EKG Results Procedure 720 Value Units Date/Time EKG 12 LEAD INITIAL [[id number]] Collected:  10/18/19 1404 Order Status:  Completed Updated:  10/18/19 2022 Ventricular Rate 140 BPM   
  Atrial Rate 140 BPM   
  P-R Interval 142 ms QRS Duration 72 ms Q-T Interval 300 ms QTC Calculation (Bezet) 458 ms Calculated P Axis 81 degrees Calculated R Axis -46 degrees Calculated T Axis 76 degrees Diagnosis --  
  Sinus tachycardia with premature supraventricular complexes Left axis deviation Septal infarct , age undetermined Abnormal ECG No previous ECGs available Confirmed by Aidan Wagner MD., Bhavik ([id number]) on 10/18/2019 8:22:34 PM 
  
  
 
 
 
Cardiac Testing/ Procedures: A. Cardiac Cath/PCI:  
B.ECHO/AMPARO:  
10/18/19 ECHO ADULT COMPLETE 10/20/2019 10/20/2019 Narrative · Left Ventricle: Small Underfilled LV with Hyperdynamic systolic  
dysfunction. End-Cavity Obliteration is present without significant LV  
gradient. Calculated left ventricular ejection fraction is 80%. Visually  
measured ejection fraction. No regional wall motion abnormality noted. Left ventricular diastolic dysfunction. · Pulmonary Artery: Mild pulmonary hypertension. Pulmonary arterial  
systolic pressure is 40 mmHg. Signed by: Cedric Rausch MD  
 
 
C. StressNuclear/Stress ECHO/Stress test: D.Vascular:  
E. EP:  
Yesi Tellez SOCIAL HX: smoker Patient Active Problem List  
 Diagnosis Date Noted  Parainfluenza infection 10/21/2019  Dyslipidemia 10/19/2019  Lupus disease of lung ([facility] 10/19/2019  COPD with acute exacerbation ([facility] 10/18/2019  DM type 2 (diabetes mellitus, type 2) ([facility] 10/18/2019  Essential hypertension 10/18/2019  Acute on chronic respiratory failure with hypoxia ([facility] 10/18/2019 Past Medical History:  
Diagnosis Date  Asthma  Chronic obstructive pulmonary disease ([facility]  Diabetes ([facility]  Hypertension  Lupus ([facility] Past Surgical History:  
Procedure Laterality Date  HX HERNIA REPAIR Allergies Allergen Reactions  Chocolate [Cocoa] Unknown (comments)  Demerol [Meperidine] Hives  Other Plant, Animal, Environmental Other (comments) Numerous environmental allergies  Tape [Adhesive] Contact Dermatitis Current Facility-Administered Medications Medication Dose Route Frequency  dextrose 5% infusion  75 mL/hr IntraVENous CONTINUOUS  
 [START ON 10/23/2019] enoxaparin (LOVENOX) injection 40 mg  40 mg SubCUTAneous DAILY  [START ON 10/23/2019] theophylline ER (DAVID-24) capsule 200 mg  200 mg Oral Q24H  
 HYDROcodone-acetaminophen (NORCO) 7.5-325 mg per tablet 1 Tab  1 Tab Oral QID PRN  
 famotidine (PEPCID) tablet 20 mg  20 mg Oral DAILY  alteplase (CATHFLO) 1 mg in sterile water (preservative free) 1 mL injection  1 mg InterCATHeter PRN  
 metoprolol (LOPRESSOR) injection 2.5 mg  2.5 mg IntraVENous Q6H PRN  
 levalbuterol (XOPENEX) nebulizer soln 1.25 mg/3 mL  1.25 mg Nebulization Q4H RT  
 ipratropium (ATROVENT) 0.02 % nebulizer solution 0.5 mg  0.5 mg Nebulization Q4H RT  
 insulin NPH (NOVOLIN N, HUMULIN N) injection 25 Units  25 Units SubCUTAneous ACB&D  ALPRAZolam (XANAX) tablet 0.25 mg  0.25 mg Oral Q8H PRN  
 influenza vaccine 2019-20 (6 mos+)(PF) (FLUARIX/FLULAVAL/FLUZONE QUAD) injection 0.5 mL  0.5 mL IntraMUSCular PRIOR TO DISCHARGE  
  guaiFENesin ER (MUCINEX) tablet 1,200 mg  1,200 mg Oral Q12H  sorbitol 70 % solution 30 mL  30 mL Oral DAILY PRN  
 sodium chloride (NS) flush 5-40 mL  5-40 mL IntraVENous Q8H  
 sodium chloride (NS) flush 5-40 mL  5-40 mL IntraVENous PRN  
 acetaminophen (TYLENOL) tablet 650 mg  650 mg Oral Q4H PRN  
 ondansetron (ZOFRAN) injection 4 mg  4 mg IntraVENous Q4H PRN  
 albuterol (PROVENTIL VENTOLIN) nebulizer solution 2.5 mg  2.5 mg Nebulization Q2H PRN  
 doxycycline (VIBRA-TABS) tablet 100 mg  100 mg Oral BID WITH MEALS  insulin lispro (HUMALOG) injection   SubCUTAneous QID WITH MEALS  glucose chewable tablet 16 g  4 Tab Oral PRN  
 glucagon (GLUCAGEN) injection 1 mg  1 mg IntraMUSCular PRN  
 dextrose 10% infusion 0-250 mL  0-250 mL IntraVENous PRN  
 methylPREDNISolone (PF) (SOLU-MEDROL) injection 60 mg  60 mg IntraVENous Q6H  
 arformoterol (BROVANA) neb solution 15 mcg  15 mcg Nebulization BID RT  
 budesonide (PULMICORT) 500 mcg/2 ml nebulizer suspension  500 mcg Nebulization BID RT  
 nicotine (NICODERM CQ) 14 mg/24 hr patch 1 Patch  1 Patch TransDERmal DAILY  hydrOXYzine HCl (ATARAX) tablet 25 mg  25 mg Oral QID PRN  
 rosuvastatin (CRESTOR) tablet 20 mg  20 mg Oral DAILY  zolpidem (AMBIEN) tablet 5 mg  5 mg Oral QHS PRN Review of Symptoms: 
Constitutional: positive for fatigue ENT: negative Respiratory: positive for cough, wheezing or dyspnea on exertion Gastrointestinal: negative for nausea and vomiting Genitourinary: no dysuria, hematuria, frequency Musculoskeletal:positive for back pain Neurological: negative for headaches and dizziness Other systems reviewed and negative except as above. Social History Socioeconomic History  Marital status:  Spouse name: Not on file  Number of children: Not on file  Years of education: Not on file  Highest education level: Not on file Tobacco Use  Smoking status: Current Every Day Smoker Packs/day: 2.00 Years: 57.00 Pack years: 114.00  Smokeless tobacco: Never Used Substance and Sexual Activity  Alcohol use: Not Currently  Drug use: Never Family History Problem Relation Age of Onset  Coronary Artery Disease Son  Stroke Son Physical Exam 
 
Visit Vitals BP (!) 86/66 (BP 1 Location: Right arm, BP Patient Position: At rest) Pulse (!) 125 Temp 97.6 °F (36.4 °C) Resp 22 Ht 5' 1\" (1.549 m) Wt 152 lb 6.4 oz (69.1 kg) SpO2 100% BMI 28.80 kg/m² General: awake, alert, and oriented. MAEx4. In acute distress HEENT Exam:   
 Normocephalic, atraumatic. Sclera anicteric . Neck: supple,  
Lung Exam: Not  dyspneic. Respirations unlabored. O2 @ 100 %  Nasal canula 3 liters Sat:  . Lungs:  Exp  Wheezing throughout. Heart Exam: PMI nondisplaced  Rate: Rhythm: tachycardic, regular ,  No  S3, S4 gallop, murmur, click, or rub. No JVD, brisk carotid upstroke, no carotid bruit Abdomen Exam:   
  obese, soft, nontender. + Bowel sounds. No palpable masses, :  Ewing Extremities Exam:   
  Atraumatic. No edema. Vascular Exam:   
  radial,  dorsalis pedis, posterior tibial, are equal and strong bilaterally Neuro exam:  No focal deficits Psy Exam: Normal affect, does not appear anxious or agitated Recent Results (from the past 12 hour(s)) GLUCOSE, POC Collection Time: 10/21/19  8:38 PM  
Result Value Ref Range Glucose (POC) 80 65 - 100 mg/dL Performed by Unkown  GLUCOSE, POC Collection Time: 10/21/19  9:11 PM  
Result Value Ref Range Glucose (POC) 121 (H) 65 - 100 mg/dL Performed by Unkown  GLUCOSE, POC Collection Time: 10/21/19 11:11 PM  
Result Value Ref Range Glucose (POC) 42 (LL) 65 - 100 mg/dL Performed by Unkown  MAGNESIUM Collection Time: 10/21/19 11:45 PM  
Result Value Ref Range Magnesium 1.8 1.6 - 2.4 mg/dL POTASSIUM  
 Collection Time: 10/21/19 11:45 PM  
Result Value Ref Range Potassium 3.5 3.5 - 5.1 mmol/L PHOSPHORUS Collection Time: 10/21/19 11:45 PM  
Result Value Ref Range Phosphorus 2.4 (L) 2.6 - 4.7 MG/DL  
GLUCOSE, POC Collection Time: 10/21/19 11:58 PM  
Result Value Ref Range Glucose (POC) 116 (H) 65 - 100 mg/dL Performed by Unkown  GLUCOSE, POC Collection Time: 10/22/19  1:52 AM  
Result Value Ref Range Glucose (POC) 54 (L) 65 - 100 mg/dL Performed by Unkown  GLUCOSE, POC Collection Time: 10/22/19  4:01 AM  
Result Value Ref Range Glucose (POC) 48 (LL) 65 - 100 mg/dL Performed by Unkown  GLUCOSE, POC Collection Time: 10/22/19  7:52 AM  
Result Value Ref Range Glucose (POC) 238 (H) 65 - 100 mg/dL Performed by Domonique Lomeli [facility]

## 2019-10-22 NOTE — PROGRESS NOTES
[address] Dosing Services: Antimicrobial Stewardship Daily Doc 10/22/2019 Consult for antibiotic dosing of Meropenem by Dr. Brenda Kruger Indication: Empiric abx for intra-abdominal infection in setting of retroperitoneal bleeding Day of Therapy 1 Ht Readings from Last 1 Encounters:  
10/20/19 154.9 cm (61\") Wt Readings from Last 1 Encounters:  
10/21/19 69.1 kg (152 lb 6.4 oz) Non-Kinetic Antimicrobial Dosing Regimen:  
Current Regimen:  Meropenem- pharmacy to dose Recommendation: Meropenem 500 mg Q8 hrs ordered for CrCl 25-49 ml/min Dose administration notes:   Doses given appropriately as scheduled Other Antimicrobial  
(not dosed by pharmacist) On doxycycline x 5 days for COPD exacerbation Cultures 10/19 MRSA screen: Neg 
10/19 Resp panel: Pos parainfluenza virus Serum Creatinine Lab Results Component Value Date/Time Creatinine 1.26 (H) 10/22/2019 03:39 PM  
 
  
  
Creatinine Clearance Estimated Creatinine Clearance: 35.3 mL/min (A) (based on SCr of 1.26 mg/dL (H)). Temp Temp: 97.8 °F (36.6 °C) WBC Lab Results Component Value Date/Time WBC 20.7 (H) 10/22/2019 03:39 PM  
 
 
  
H/H Lab Results Component Value Date/Time HGB 11.4 (L) 10/22/2019 03:39 PM  
 
 
  
Platelets Lab Results Component Value Date/Time PLATELET 198 (L) 98/33/7181 03:39 PM  
 
 
  
 
Thanks, Pharmacist DONNA Granados Contact information: [phone]

## 2019-10-23 NOTE — PROGRESS NOTES
2119-Pt BP steadily decreasing. Post breathing Treatment pt started guppy breathing lips were cyanotic BIPAP Applied. Unable to obtain O2 Saturations @ this time. Attempts being made to obtain ABGS but pt BP steadily dropping. Currently running 60's/20's. Titrating Pressors to generate BP. Paged on call for Pulmonary with ABG results. TORB from Dr. Debi Bence for 2 Units Emergent PRBC's & Intubation. CTA Abdomen if stable enough to go down. 2145- Pt intubated by Dr. Nichelle Wilks Anesthesia 23 @ Lips. 2255- Paged Dr. Debi Bence re: Post Intubation ABG Results. 2 Units PRBCS emergently transfused. 4444- Labs obtained post transfusion. FFP to be transfused now. BP remains dismal despite being maxed on everything. 0325- RN x 2 remain @ bedside. Pt continues to experience intermittent agitation. Worse with family present touching/talking to her. Reaching directly for ETT. Continue to titrate Fentanyl. Precedex added recently will titrate accordingly  At this point pt remains full code so had to limit pt stimulation and interactions 2 visitors at a time. Family remains appropriate for situation. BP remains low. 5108- Pt continues to have low BP & SVT. Attempted to speak with family again about pt current status and dose of medications. No evidence of learning. Dtr who [facility] states, \" we have talked about a lot of this over the years and she said always do everything. \" 5641- Paged Dr. Debi Bence to inform of Pt status and deterioration in BP. Advised to attempt to get A Line from Anesthesia. Given 1 Unit PRBCS and 1 Unit FFP. Orders entered accordingly. Nereyda March R.N., CCRN

## 2019-10-23 NOTE — CONSULTS
Palliative Medicine Consult Pedrito: [phone] ([id number]) Patient Name: Alvaro Groves YOB: 1945 Date of Initial Consult: 10/23/2019 Reason for Consult: Care decisions Requesting Provider: Sepideh Delgado Primary Care Physician: Shonda Ceballos MD 
 
 SUMMARY:  
Alvaro Groves is a 69 y.o. with a past history of COPD, asthma, DM, HTN, lupus, and tobacco abuse, who was admitted on 10/18/2019 from home with a diagnosis of COPD exacerbation. Patient presented to the ED with complaints of shortness of breath. ED eval revealed increased work of breathing on exam tachycardia, and chest CT with no acute process. Stable right upper lobe pulmonary nodules. Patient admitted for IV steroids, antibiotics, and supplemental oxygen/bipap. RVP positive for parainfluenza. Patient continued to be tachycardic and plan for CTA to rule out PE delayed due to poor IV access. Patient was empirically started on therapeutic Lovenox while awaiting CTA. CTA performed 10/21 and showed No acute process or evidence of pulmonary embolism. Yesterday (10/22), patient developed acute onset of back and abdominal pain with associated hy[otension. Vasopressor support was initiated and cbc with drop in hemoglobin (11.0-->7. 8). CT abdomen revealed large right-sided retroperitoneal hematoma with evidence of active contrast extravasation. IR consulted and arteriogram showed vascular irregularity in the right retroperitoneum fed by branches of the right L3 and L4 lumbar arteries. These arteries were embolized with microcoils. Continued hemodynamic monitoring, fluid and packed RBC treatment and serial H&H is recommended. Overnight, patient condition continued to deteriorate, was intubated, oliguric, and currently on max dose of vasopressor support. Has received a total of 7 PRBCs, 3 FFP, and 1 dose of platelets. 2 of cryo ordered by transfused yet. Plan for central line and HD cath placement today. Current medical issues leading to Palliative Medicine involvement include: care decisions. SH: , has three daughters and son Aarti Larios, Jennie Jesus Alberto, and Clarkeesa Bertrand). One son is . Patient resides with her elderly mother and her daughter with disability. PALLIATIVE DIAGNOSES:  
1. Physical debility 2. Goals of care 3. ACP 4. Severe septic shock 5. Respiratory failure s/p intubation 10/22 6. Retroperitoneal hematoma- s/p embolization 10/22 7. Acute COPD exacerbation- likely triggered by parainfluenza virus PLAN:  
1. Met with patient initially with [place] and then with large family outside the ICU (including Erenest Dustin -children Radha Lucio and Foster Chepe- sister Fabrizio Muller at home caring for patient's elderly mother) with Marysol Campos. 2. Patient intubated and sedated. Remains profoundly hypotensive despite multiple blood transfusions and max pressor support. 3. Goals of care--> Discussed current hospitalization and patient's prior level of functioning. Patient resided with her elderly mother and the two of them \"took care of each other\". She did not leave the house often and family would get groceries and medications for her. Was frequently interacting with her family on a daily basis, spent her days watching [employer] and [employer]. Family has been kept well informed of patient's critical condition, understand that she continues to require blood products and blood pressure support, and may need dialysis in the future. Family members tearful at times, describing patient has a \"fighter\" and hopeful that bleeding will be able to be controlled and patient will recover. 4. Code status--> FULL CODE. Has been addressed by multiple providers in the last 24 hours, family clear on wishes for full restorative measures in an effort to recover. 5. ACP--> No AMD on file. Legal next of kin is surrogate decision (three daughters and son).   
6. Initial consult note routed to primary continuity provider and/or primary health care team members 7. Communicated plan of care with: Palliative IDTKameron 192 Team 
 
 GOALS OF CARE / TREATMENT PREFERENCES:  
 
GOALS OF CARE: 
Patient/Health Care Proxy Stated Goals: Prolong life TREATMENT PREFERENCES:  
Code Status: Full Code Advance Care Planning: 
[x] The [facility] Interdisciplinary Team has updated the ACP Navigator with [place] and Patient Capacity Primary Decision Maker: Santiago MEDINA) - Daughter - [phone] Primary Decision Maker: Bernardo MEDINA) - Daughter - [phone] Primary Decision Maker: Cyrus MEDINA) - Son - [phone] Primary Decision Maker: Miracle MEDINA) - Daughter - [phone] Advance Care Planning 10/23/2019 Patient's Healthcare Decision Maker is: Legal Next of Kin Confirm Advance Directive None Patient Would Like to Complete Advance Directive Unable Medical Interventions: Full interventions Other Instructions: Other: As far as possible, the palliative care team has discussed with patient / health care proxy about goals of care / treatment preferences for patient. HISTORY:  
 
History obtained from: family, chart CHIEF COMPLAINT: shortness of breath HPI/SUBJECTIVE: The patient is:  
[] Verbal and participatory [x] Non-participatory due to: intubated and sedated. Patient intubated and sedated. Remains profoundly hypotensive despite multiple blood transfusions and max pressor support. Clinical Pain Assessment (nonverbal scale for severity on nonverbal patients):  
Clinical Pain Assessment Severity: 0 Activity (Movement): Lying quietly, normal position Duration: for how long has pt been experiencing pain (e.g., 2 days, 1 month, years) Frequency: how often pain is an issue (e.g., several times per day, once every few days, constant) FUNCTIONAL ASSESSMENT:  
 
Palliative Performance Scale (PPS): PPS: 10 
 
 
 PSYCHOSOCIAL/SPIRITUAL SCREENING:  
 
Palliative IDT has assessed this patient for cultural preferences / practices and a referral made as appropriate to needs (Cultural Services, Patient Advocacy, Ethics, etc.) Any spiritual / Yazidi concerns: 
[] Yes /  [x] No 
 
Caregiver Burnout: 
[] Yes /  [x] No /  [] No Caregiver Present Anticipatory grief assessment:  
[x] Normal  / [] Maladaptive ESAS Anxiety: ESAS Depression:    
 
 
 REVIEW OF SYSTEMS:  
 
Positive and pertinent negative findings in ROS are noted above in HPI. The following systems were [x] reviewed / [] unable to be reviewed as noted in HPI Other findings are noted below. Systems: constitutional, ears/nose/mouth/throat, respiratory, gastrointestinal, genitourinary, musculoskeletal, integumentary, neurologic, psychiatric, endocrine. Positive findings noted below. Modified ESAS Completed by: provider Drowsiness: 10 Pain: 0 Dyspnea: 0 Stool Occurrence(s): 1 PHYSICAL EXAM:  
 
From RN flowsheet: 
Wt Readings from Last 3 Encounters:  
10/21/19 152 lb 6.4 oz (69.1 kg) Blood pressure 109/73, pulse (!) 159, temperature 98.7 °F (37.1 °C), resp. rate 23, height 5' 1\" (1.549 m), weight 152 lb 6.4 oz (69.1 kg), SpO2 96 %. Pain Scale 1: Adult Nonverbal Pain Scale Pain Intensity 1: 7 Pain Location 1: Abdomen Pain Orientation 1: Anterior, Posterior Pain Description 1: Constant Pain Intervention(s) 1: Repositioned Last bowel movement, if known:  
 
Constitutional: intubated and sedated Eyes: pupils equal, anicteric ENMT: no nasal discharge, dry mucous membranes Cardiovascular: regular rhythm, distal pulses faint Respiratory: breathing not labored, symmetric Gastrointestinal: soft non-tender, +bowel sounds Musculoskeletal: no deformity, no tenderness to palpation, extremities cool to touch Skin: warm, dry Neurologic: sedated Psychiatric: unable to assess Other: 
 
 
 HISTORY:  
 
 Principal Problem: COPD with acute exacerbation ([facility] (10/18/2019) Active Problems: 
  DM type 2 (diabetes mellitus, type 2) ([facility] (10/18/2019) Essential hypertension (10/18/2019) Acute on chronic respiratory failure with hypoxia ([facility] (10/18/2019) Dyslipidemia (10/19/2019) Lupus disease of lung ([facility] (10/19/2019) Parainfluenza infection (10/21/2019) Past Medical History:  
Diagnosis Date  Asthma  Chronic obstructive pulmonary disease ([facility]  Diabetes ([facility]  History of vascular access device 10/21/2019 [facility] VAT 5 FR dual PICC for access L basilic  Hypertension  Lupus ([facility] Past Surgical History:  
Procedure Laterality Date  HX HERNIA REPAIR    
 IR INSERT NON TUNL CVC OVER 5 YRS  10/23/2019  IR INSERT NON TUNL CVC OVER 5 YRS  10/23/2019  IR OCCL TXCATH HEMMORAGE W SI  10/22/2019 Family History Problem Relation Age of Onset  Coronary Artery Disease Son  Stroke Son History reviewed, no pertinent family history. Social History Tobacco Use  Smoking status: Current Every Day Smoker Packs/day: 2.00 Years: 57.00 Pack years: 114.00  Smokeless tobacco: Never Used Substance Use Topics  Alcohol use: Not Currently Allergies Allergen Reactions  Chocolate [Cocoa] Unknown (comments)  Demerol [Meperidine] Hives  Other Plant, Animal, Environmental Other (comments) Numerous environmental allergies  Tape [Adhesive] Contact Dermatitis Current Facility-Administered Medications Medication Dose Route Frequency  fentaNYL (PF) 1,500 mcg/30 mL (50 mcg/mL) infusion  0-200 mcg/hr IntraVENous CONTINUOUS  
 fentaNYL citrate (PF) injection 25 mcg  25 mcg IntraVENous Q2H PRN  
 dexmedeTOMidine in 0.9 % NaCl (PRECEDEX) 400 mcg/100 mL (4 mcg/mL) infusion soln  0.2-0.7 mcg/kg/hr IntraVENous TITRATE  
 0.9% sodium chloride infusion 250 mL  250 mL IntraVENous PRN  
  famotidine (PF) (PEPCID) 20 mg in sodium chloride 0.9% 10 mL injection  20 mg IntraVENous Q24H  
 0.9% sodium chloride infusion 250 mL  250 mL IntraVENous PRN  
 insulin lispro (HUMALOG) injection   SubCUTAneous Q6H  
 insulin NPH (NOVOLIN N, HUMULIN N) injection 15 Units  15 Units SubCUTAneous ACB&D  
 0.9% sodium chloride infusion 250 mL  250 mL IntraVENous PRN  
 0.9% sodium chloride infusion 250 mL  250 mL IntraVENous PRN  theophylline ER (DAVID-24) capsule 200 mg  200 mg Oral Q24H  
 HYDROcodone-acetaminophen (NORCO) 7.5-325 mg per tablet 1 Tab  1 Tab Oral QID PRN  
 HYDROmorphone (DILAUDID) syringe 0.5 mg  0.5 mg IntraVENous Q4H PRN  
 PHENYLephrine (HUNTER-SYNEPHRINE) 100 mg in 0.9% sodium chloride 250 mL infusion   mcg/min IntraVENous TITRATE  
 NOREPINephrine (LEVOPHED) 32 mg in dextrose 5% 250 mL infusion  2-200 mcg/min IntraVENous TITRATE  doxycycline (VIBRAMYCIN) 100 mg in 0.9% sodium chloride (MBP/ADV) 100 mL  100 mg IntraVENous Q12H  
 meropenem (MERREM) 500 mg in 0.9% sodium chloride (MBP/ADV) 50 mL  0.5 g IntraVENous Q8H  
 vasopressin (VASOSTRICT) 20 Units in 0.9% sodium chloride 100 mL infusion  0.04 Units/min IntraVENous CONTINUOUS  
 sodium bicarbonate (8.4%) 150 mEq in dextrose 5% 1,000 mL infusion   IntraVENous CONTINUOUS  
 0.9% sodium chloride infusion 250 mL  250 mL IntraVENous PRN  
 alteplase (CATHFLO) 1 mg in sterile water (preservative free) 1 mL injection  1 mg InterCATHeter PRN  
 levalbuterol (XOPENEX) nebulizer soln 1.25 mg/3 mL  1.25 mg Nebulization Q4H RT  
 ipratropium (ATROVENT) 0.02 % nebulizer solution 0.5 mg  0.5 mg Nebulization Q4H RT  
 ALPRAZolam (XANAX) tablet 0.25 mg  0.25 mg Oral Q8H PRN  
 influenza vaccine 2019-20 (6 mos+)(PF) (FLUARIX/FLULAVAL/FLUZONE QUAD) injection 0.5 mL  0.5 mL IntraMUSCular PRIOR TO DISCHARGE  guaiFENesin ER (MUCINEX) tablet 1,200 mg  1,200 mg Oral Q12H  sorbitol 70 % solution 30 mL  30 mL Oral DAILY PRN  
 sodium chloride (NS) flush 5-40 mL  5-40 mL IntraVENous Q8H  
 sodium chloride (NS) flush 5-40 mL  5-40 mL IntraVENous PRN  
 acetaminophen (TYLENOL) tablet 650 mg  650 mg Oral Q4H PRN  
 ondansetron (ZOFRAN) injection 4 mg  4 mg IntraVENous Q4H PRN  
 albuterol (PROVENTIL VENTOLIN) nebulizer solution 2.5 mg  2.5 mg Nebulization Q2H PRN  
 glucose chewable tablet 16 g  4 Tab Oral PRN  
 glucagon (GLUCAGEN) injection 1 mg  1 mg IntraMUSCular PRN  
 dextrose 10% infusion 0-250 mL  0-250 mL IntraVENous PRN  
 methylPREDNISolone (PF) (SOLU-MEDROL) injection 60 mg  60 mg IntraVENous Q6H  
 arformoterol (BROVANA) neb solution 15 mcg  15 mcg Nebulization BID RT  
 budesonide (PULMICORT) 500 mcg/2 ml nebulizer suspension  500 mcg Nebulization BID RT  
 nicotine (NICODERM CQ) 14 mg/24 hr patch 1 Patch  1 Patch TransDERmal DAILY  hydrOXYzine HCl (ATARAX) tablet 25 mg  25 mg Oral QID PRN  
 rosuvastatin (CRESTOR) tablet 20 mg  20 mg Oral DAILY  zolpidem (AMBIEN) tablet 5 mg  5 mg Oral QHS PRN  
 
 
 
 LAB AND IMAGING FINDINGS:  
 
Lab Results Component Value Date/Time WBC 13.3 (H) 10/23/2019 04:37 AM  
 HGB 7.1 (L) 10/23/2019 11:22 AM  
 PLATELET 148 (L) 46/51/4896 11:22 AM  
 
Lab Results Component Value Date/Time Sodium 129 (L) 10/23/2019 04:37 AM  
 Potassium 3.5 10/23/2019 04:37 AM  
 Chloride 98 10/23/2019 04:37 AM  
 CO2 23 10/23/2019 04:37 AM  
 BUN 44 (H) 10/23/2019 04:37 AM  
 Creatinine 1.68 (H) 10/23/2019 04:37 AM  
 Calcium 5.8 (LL) 10/23/2019 04:37 AM  
 Magnesium 2.1 10/23/2019 04:37 AM  
 Phosphorus 4.5 10/23/2019 04:37 AM  
  
Lab Results Component Value Date/Time AST (SGOT) 209 (H) 10/23/2019 04:37 AM  
 Alk. phosphatase 57 10/23/2019 04:37 AM  
 Protein, total 4.2 (L) 10/23/2019 04:37 AM  
 Albumin 2.1 (L) 10/23/2019 04:37 AM  
 Globulin 2.1 10/23/2019 04:37 AM  
 
Lab Results Component Value Date/Time INR 1.3 (H) 10/23/2019 12:39 AM  
 Prothrombin time 13.0 (H) 10/23/2019 12:39 AM  
  
No results found for: IRON, FE, TIBC, IBCT, PSAT, FERR Lab Results Component Value Date/Time pH 7.28 (L) 10/23/2019 05:05 AM  
 PCO2 45 10/23/2019 05:05 AM  
 PO2 118 (H) 10/23/2019 05:05 AM  
 
No components found for: [place] Lab Results Component Value Date/Time  (H) 10/23/2019 12:39 AM  
 CK - MB 10.5 (H) 10/23/2019 12:39 AM  
  
 
 
   
 
Total time:  
Counseling / coordination time, spent as noted above:  
> 50% counseling / coordination?:  
 
Prolonged service was provided for  []30 min   []75 min in face to face time in the presence of the patient, spent as noted above. Time Start:  
Time End:  
Note: this can only be billed with 04960 (initial) or 40810 (follow up). If multiple start / stop times, list each separately.

## 2019-10-23 NOTE — ACP (ADVANCE CARE PLANNING)
Palliative Medicine Social Work Patient does not have AMD. Medical decision making falls to Legal Next of Kin, which are patient's 4 adult children making decisions collectively: 
  Primary Decision Maker: Starla MEDINA) - Daughter - [phone] Primary Decision Maker: Gideon MEDINA) - Daughter - [phone] Primary Decision Maker: Saira MEDINA) - Son - [phone] Primary Decision Maker: Eugenia MEDINA) - Daughter - [phone] Patient family has had several discussions with providers re: code status. Patient is full code. Thank you for the opportunity to be involved in the care of Ms. Theresa Logan and her family. Viviana Herrera LMSW, Supervisee in Social Work Palliative Medicine  [phone]

## 2019-10-23 NOTE — ANESTHESIA PREPROCEDURE EVALUATION
Relevant Problems No relevant active problems Anesthetic History No history of anesthetic complications Review of Systems / Medical History Patient summary reviewed, nursing notes reviewed and pertinent labs reviewed Pulmonary Within defined limits COPD Asthma Neuro/Psych Within defined limits Cardiovascular Within defined limits Hypertension GI/Hepatic/Renal 
Within defined limits Endo/Other Within defined limits Diabetes Other Findings Comments: Altered mental status, hypotensive H/o retroperitoneal bleed Physical Exam 
 
Airway Cardiovascular Dental 
 
Dentition: Edentulous Pulmonary Abdominal 
 
 
 
 Other Findings Anesthetic Plan ASA: 4, emergent Anesthesia type: general

## 2019-10-23 NOTE — ACP (ADVANCE CARE PLANNING)
Advance Care Planning (ACP) Provider Note - Comprehensive Date of ACP Conversation:  10/23/19 Persons included in Conversation:  patient's family Length of ACP Conversation in minutes:  16-20 minutes Authorized Decision Maker (if patient is incapable of making informed decisions): This person is: pt's children General ACP for ALL Patients with Decision Making Capacity: 
Importance of advance care planning, including choosing a healthcare agent to communicate patient's healthcare decisions if patient lost the ability to make decisions. Review of Existing Advance Directive: 
Patient and family do not have a current advance directive Active Diagnoses:  
Patient Active Problem List  
Diagnosis Code  COPD with acute exacerbation ([facility] J44.1  DM type 2 (diabetes mellitus, type 2) ([facility]) E11.9  
 Essential hypertension I10  
 Acute on chronic respiratory failure with hypoxia ([facility]) J96.21  
 Dyslipidemia E78.5  Lupus disease of lung ([facility]) M32.13  
 Parainfluenza infection B34.8 SHOCK Acute blood loss anemia from retroperitoneal bleed Acute respiratory failure with hypoxia These active diagnoses are of sufficient risk that focused discussion on advance care planning is indicated in order to allow the patient to thoughtfully consider personal goals of care; and, if situations arise that prevent the ability to personally give input, to ensure appropriate representation of their personal desires for different levels and aggressiveness of care. For Serious or Chronic Illness: 
Understanding of medical condition Reviewed pt's clinical status. Jake Ruiz has multiple medical problems including shock, acute blood loss anemia, acute respiratory with hypoxia. We reviewed current treatment plan.  Further, we discussed pt's wishes on how she would like to proceed (aggressive/heroic resuscitation vs not intervening and allowing nature takes its course) if she were to suffer cardiopulmonary arrest. 
 
I was paged by nursing re: clinical status as an update. Chart reviewed, case discussed with ICU RN staff. Due to pt's grim prognosis, I met with pt's family to discuss goals of care. I met with family in ICU waiting area concerning pt's dire situation. Present were pt's daughter, granddaughter, and son. They are aware of the pt's grave condition, as pt is maxed out on triple pressors and receiving blood products and mechanically ventilated. She is hemodynamically unstable. I expressed my concerns that if pt were to suffer cardiac arrest tonight (high likelihood), her odds of survival would be slim to nil. We also discussed about quality of life and avoiding prolonged suffering. It was unclear who was pt's actual MPOA however pt's son did say they will meet to discuss about possibly making the patient DNR. I have asked them to update ICU nursing team as they make their decision Interventions Provided: 
Referral made for ACP follow-up assistance to: Palliative care FULL CODE

## 2019-10-23 NOTE — PROGRESS NOTES
Responded to request for  to the ICU. Staff was working with Rolo Trivedi, her daughter, grandchildren and one great grandchild were in the waiting area. The daughter shared she is the youngest of 5 children. She shared she and her mother have had conversations and she is the primary decision maker on the AMD Miss Georges Dewitt filled out. Her brother is secondary decision maker. They are a close family and communicate well with each other. The daughter shared the sister in law is more aware of some of the medical and has a gift to be able to share information with the family so the daughter asked her to also be included when possible. The conversations the daughter has had with her mother included that she wanted everything done in medical situations. The daughter and grandchildren shared they are very receptive to prayer and are praying for a miracle. Present as Doctor Melanie Orellana spoke with the daughter. Provided gentle listening presence and prayer with family. Consulted with Palliative Care RN practitioner Estevan Brownlee. Chaplains will follow as able and/or needed. Visited by: Lee Ervin MS., [address] ([id number])

## 2019-10-23 NOTE — PROGRESS NOTES
No further burst of SVT, continues with significant sinus tachycardia related to her critical illness. Cardiology will sign-off at this time, do not hesitate to call with questions.

## 2019-10-23 NOTE — PROGRESS NOTES
Follow up visit with Miss Jer Manley family in the waiting area. Several of the family members have stepped out of the hospital to go to their cars and are resting their. Grand daughters indicate things are about the same as this morning. They indicated Miss Jer Manley youngest daughter Quinton Garcia would not leave. Met Sandi a bit later coming from the cafe area, walked with her back up to the ICU waiting area. Encourage her to also get some rest, perhaps taking turns with her siblings. She indicated she did not think she would do that until her mother was better. Provided supportive presence and assurance of continued prayers. Visited by: Darek Soliz., MS., [address] ([id number])

## 2019-10-23 NOTE — PROGRESS NOTES
Joined Palliative Care RN Practioner Sal Max and  Shandra as the spoke with family of Miss Lashawn Valerio in the waiting area. More family had gathered, two of her daughters and her only living son and his wife along with several grand and great grand children. They shared stories about their mom and how she has kept them all together. They are still hopeful for a miracle though appear to be aware of the seriousness of their mom's illness. Provided supportive spiritual presence and assurance of prayers. Visited by: Zuri Lennon., MS., [address] [place] [address] ([id number])

## 2019-10-23 NOTE — PROGRESS NOTES
Pharmacy Dosing Note Current Regimen: Famotidine 20 mg PO daily New Regimen: Change to famotidine 20 mg IV daily - NPO - dosing appropriate for renal function.  
 
Thank you, 
Linnea Lai, PHARMD

## 2019-10-23 NOTE — ANESTHESIA PROCEDURE NOTES
Emergent Intubation Performed by: Destiney Wolf MD 
Authorized by: Destiney Wolf MD  
 
Emergent Intubation: Location:  ICU Date/Time:  10/22/2019 9:57 PM 
Indications:  Respiratory failure Spontaneous Ventilation: present Level of Consciousness: sedated Preoxygenated: Yes Airway Documentation: Airway:  ETT - Cuffed Technique:  Direct laryngoscopy Blade Type:  Antonina Fields Blade Size:  3 ETT size (mm):  7.5 ETT Line Andrey:  Lips ETT Insertion depth (cm):  19 Placement verified by: auscultation, EtCO2 and BBS Attempts:  1 Difficult airway: No

## 2019-10-23 NOTE — CONSULTS
[address] Name:  Luh Clifton 
MR#:  [id number] :  1945 ACCOUNT #:  [de-identified] DATE OF SERVICE:  10/23/2019 NEPHROLOGY CONSULTATION 
 
REQUESTING PHYSICIAN:  Dr. Sridevi Le:  Elevated creatinine. HISTORY OF PRESENT ILLNESS:  The patient is a 71-year-old white female with multiple medical problems including hypertension, diabetes, and COPD. She reportedly has a history of lupus as well, although it is not clear that that is clinically active. She has history of tobacco abuse and is a current smoker. She was admitted to the hospital with COPD exacerbation, but has deteriorated and developed septic shock and retroperitoneal hematoma possibly due to DIC. She is in ICU and is intubated and sedated and on multiple pressors. Urine output has declined and she is now oliguric. Her serum creatinine is 1.7, up from 1.3 on admission. Potassium is normal.  Bicarbonate is normal. 
 
REVIEW OF SYSTEMS:  Unable to obtain due to the patient's condition. PAST MEDICAL HISTORY:  Tobacco abuse, COPD, hypertension, type 2 diabetes mellitus, history of lupus followed at [facility]. FAMILY HISTORY:  No relevant family history of renal disease. SOCIAL HISTORY:  She smokes two packs per day. PHYSICAL EXAMINATION: 
VITAL SIGNS:  Temperature 98.8, pulse 160. Blood pressure has been difficult to determine due to calcified vessels, gradients are currently in the 990 systolic with a good femoral pulse. GENERAL:  Critically ill white female, who is intubated and sedated. EYES:  Anicteric. Pupils are reactive. ENMT:  There is an ET tube in place. There is no epistaxis. NECK:  Nontender with no mass. HEART:  Tachycardic. There is no lower extremity edema. RESPIRATORY:  There are ventilated breath sounds bilaterally with symmetrical expansion. GI:  Abdomen is soft and nontender. There is no guarding or rebound. Bowel sounds are active. SKIN:  Skin is cool with some bruising. There is no rash. MUSCULOSKELETAL:  There is no cyanosis or clubbing. There is no synovitis in fingers or wrists bilaterally. GENITOURINARY:  There is a Ewing catheter in place with scant bloody urine output. LABORATORY DATA:  Sodium 129, potassium 3.5, chloride 98, bicarb 23, BUN 44, creatinine 1.68, calcium 5.8, albumin 2.1, lactic acid 2.2. White count 13.3, hemoglobin 9.3, platelets 77, INR 1.3. RADIOGRAPHIC STUDIES:  CT scan of the abdomen and pelvis with IV contrast done yesterday showed large right-sided retroperitoneal hematoma with no hydronephrosis. ASSESSMENT:  Oliguric acute renal failure in the setting of septic and hemorrhagic shock and retroperitoneal hematoma. There is currently no urgent indication for dialysis, but she is very unstable with oliguria. There is a high likelihood that she may require renal replacement therapy. She is currently receiving some platelets in anticipation of placement of additional access. PLAN: 
1. Proceed with placement of dialysis line while placing other access this morning. 2.  There is no acute indication for dialysis. We will continue to observe closely, but be prepared to initiate dialysis if needed, which could be necessary at any time today. 3.  I spoke with the daughter, who is her power of . Discussed the current situation and explained the possible need for dialysis. We discussed the risks with emphasis on bleeding, infection, and hypotension. Daughter gives consent for hemodialysis or continuous renal replacement therapy if needed on behalf of the patient. Consent forms were signed and placed on the chart. 4.  Continue with supportive care with blood products, empiric antibiotics, and blood pressure support. 5.  We will monitor serial labs and follow closely with you to assist in her management.  
 
 
MD LISA [id number] 
D:  10/23/2019 9:52 
 T:  10/23/2019 14:09 
JOB #:  [id number]

## 2019-10-23 NOTE — PROGRESS NOTES
1900 Bedside and Verbal shift change report given to Theresa RN (oncoming nurse) by Eileen Esquivel RN (offgoing nurse). Report included the following information SBAR, Kardex, ED Summary, Procedure Summary, Intake/Output, MAR, Recent Results and Cardiac Rhythm ST.  
2000 assessment complete. See flowsheet. Lungs sound wheezing. Pt is alert and oriented x4. UO 30.  
2145 pt intubated. 2350 paged Dr. Paula Ratliff regarding pt agitation on vent. [address] Paula Ratliff returned call, ordered fent drip started at 25mcg with a fent 25mcg bolus q2hrs PRN.  
0142 fentanyl bolus 25mcg given due to agitation. 0207 Dr. Paula Ratliff paged regarding pts agitation. 3726 Dr. Paula Ratliff returned call, ordered 1g calcium cholride, and orders to start precedex. 0304 precedex rate increased to 0.3mcg due to agitation. 0322 precedex rate increased to 0.4mcg due to agitation. 0325 fentanyl bolus 25mcg given due to agitation. 0400 reassessment complete. Changes documented in flowsheet. Family at bedside. 0430 labs drawn and sent. 0500 pt appears to have calmed down. Resting quietly in bed.  
0645 dr Bessy Hogan to bedside to attempt arterial line. Consent obtained from Bruno, daughter, Sandi.  
0700 Bedside and Verbal shift change report given to [address] (oncoming nurse) by Sonam/Nereyda RN (offgoing nurse). Report included the following information SBAR, Kardex, ED Summary, Procedure Summary, MAR, Recent Results and Cardiac Rhythm SVT. 46 primary RN lucy at bedside assisting dr Bessy Hogan to assume primary care.

## 2019-10-23 NOTE — PROGRESS NOTES
1900 Bedside and Verbal shift change report given to Sonam/Nereyda RN (oncoming nurse) by Yajaira Rodriguez RN (offgoing nurse). Report included the following information SBAR, Kardex, Procedure Summary, Intake/Output, MAR, Recent Results and Cardiac Rhythm SVT/ST. 1930 labs drawn and sent. 2000 assessment complete. See flowsheet. Pt follows commands, responds to voice. Lung sounds coarse upper and wheezing lower. Femoral pulses are palpable and radial pulses are present with doppler. Family at bedside. 2030 paged Dr. Radha Bland regarding pts recent lab results. 2035 Dr. Radha Bland returned call and ordered 1 unit PRBCs. 2112 started PRBCs. Family remains at bedside. 2140 PRBCs completed. 2200 pt resting quietly in bed, family present. Pt turned and repositioned. Oral care done. 2330 labs drawn and sent. 0000 reassessment done. No change to previous. See flowsheet. Family remains at bedside. 0200 pt resting in bed, turned and repositioned. Oral care and suctioning performed. 0400 labs drawn and sent. Reassessment complete. No change to previous, see flowsheet. Pt turned and repositioned and oral care done. 0510 paged Dr. Radha Bland regarding pts labs. 1880 Dr. Radha Bland returned call and ordered 2 units PRBCs.  
0700 Bedside and Verbal shift change report given to Isael Deleon (oncoming nurse) by Sonam/ Ilda Saleh RN (offgoing nurse). Report included the following information SBAR, Kardex, ED Summary, Procedure Summary, Intake/Output, MAR, Recent Results and Cardiac Rhythm SVT/ST.

## 2019-10-23 NOTE — PROGRESS NOTES
[facility] 
[address] [place] [address], [address] [place] [address] 
[phone] Medical Progress Note NAME: Lord Patel :  1945 MRM:  [id number] Date/Time: 10/23/2019 Subjective: Chief Complaint:  Patient was seen and examined by me. Chart reviewed. Continued to have severe shock, bleeding. Has had multiple RBCs and FFP transfusions. On triple pressors Objective:  
 
 
Vitals:  
 
 
Last 24hrs VS reviewed since prior progress note. Most recent are: 
 
Visit Vitals BP (!) 46/20 Pulse (!) 160 Temp 99.1 °F (37.3 °C) Resp 20 Ht 5' 1\" (1.549 m) Wt 69.1 kg (152 lb 6.4 oz) SpO2 (!) 65% BMI 28.80 kg/m² SpO2 Readings from Last 6 Encounters:  
10/23/19 (!) 65% O2 Flow Rate (L/min): 3 l/min Intake/Output Summary (Last 24 hours) at 10/23/2019 4976 Last data filed at 10/23/2019 1143 Gross per 24 hour Intake 5502.21 ml Output 342 ml Net 5160.21 ml Exam:  
 
Physical Exam: 
 
Gen:  Disheveled, ill-appearing, intubated HEENT:  Pink conjunctivae, sluggish pupils Neck:  Supple, without masses Resp:  No accessory muscle use, CTAB anteriorly Card:  No murmurs, normal S1, S2 without thrills, anasarca Abd:  Soft, non-tender, non-distended, normoactive bowel sounds are present Musc:  Cool extremities Skin:  Diffused bruising Neuro:  Intubated, sedated Psych: Intubated, sedated Medications Reviewed: (see below) Lab Data Reviewed: (see below) 
 
______________________________________________________________________ Medications:  
 
Current Facility-Administered Medications Medication Dose Route Frequency  fentaNYL (PF) 1,500 mcg/30 mL (50 mcg/mL) infusion  0-200 mcg/hr IntraVENous CONTINUOUS  
 fentaNYL citrate (PF) injection 25 mcg  25 mcg IntraVENous Q2H PRN  
 dexmedeTOMidine in 0.9 % NaCl (PRECEDEX) 400 mcg/100 mL (4 mcg/mL) infusion soln  0.2-0.7 mcg/kg/hr IntraVENous TITRATE  0.9% sodium chloride infusion 250 mL  250 mL IntraVENous PRN  
 famotidine (PF) (PEPCID) 20 mg in sodium chloride 0.9% 10 mL injection  20 mg IntraVENous Q24H  
 0.9% sodium chloride infusion 250 mL  250 mL IntraVENous PRN  
 calcium chloride 1 g in 0.9% sodium chloride 250 mL IVPB  1 g IntraVENous ONCE  
 desmopressin (DDAVP) 20 mcg in 0.9% sodium chloride 50 mL IVPB  20 mcg IntraVENous ONCE  theophylline ER (DAVID-24) capsule 200 mg  200 mg Oral Q24H  
 HYDROcodone-acetaminophen (NORCO) 7.5-325 mg per tablet 1 Tab  1 Tab Oral QID PRN  
 insulin NPH (NOVOLIN N, HUMULIN N) injection 10 Units  10 Units SubCUTAneous ACB&D  
 HYDROmorphone (DILAUDID) syringe 0.5 mg  0.5 mg IntraVENous Q4H PRN  
 PHENYLephrine (HUNTER-SYNEPHRINE) 100 mg in 0.9% sodium chloride 250 mL infusion   mcg/min IntraVENous TITRATE  
 NOREPINephrine (LEVOPHED) 32 mg in dextrose 5% 250 mL infusion  2-200 mcg/min IntraVENous TITRATE  doxycycline (VIBRAMYCIN) 100 mg in 0.9% sodium chloride (MBP/ADV) 100 mL  100 mg IntraVENous Q12H  
 meropenem (MERREM) 500 mg in 0.9% sodium chloride (MBP/ADV) 50 mL  0.5 g IntraVENous Q8H  
 vasopressin (VASOSTRICT) 20 Units in 0.9% sodium chloride 100 mL infusion  0.04 Units/min IntraVENous CONTINUOUS  
 sodium bicarbonate (8.4%) 150 mEq in dextrose 5% 1,000 mL infusion   IntraVENous CONTINUOUS  propofol (DIPRIVAN) 10 mg/mL injection  0.9% sodium chloride infusion 250 mL  250 mL IntraVENous PRN  
 0.9% sodium chloride infusion 250 mL  250 mL IntraVENous PRN  
 0.9% sodium chloride infusion 250 mL  250 mL IntraVENous PRN  
 alteplase (CATHFLO) 1 mg in sterile water (preservative free) 1 mL injection  1 mg InterCATHeter PRN  
 levalbuterol (XOPENEX) nebulizer soln 1.25 mg/3 mL  1.25 mg Nebulization Q4H RT  
 ipratropium (ATROVENT) 0.02 % nebulizer solution 0.5 mg  0.5 mg Nebulization Q4H RT  
 ALPRAZolam (XANAX) tablet 0.25 mg  0.25 mg Oral Q8H PRN  
  influenza vaccine 2019-20 (6 mos+)(PF) (FLUARIX/FLULAVAL/FLUZONE QUAD) injection 0.5 mL  0.5 mL IntraMUSCular PRIOR TO DISCHARGE  guaiFENesin ER (MUCINEX) tablet 1,200 mg  1,200 mg Oral Q12H  sorbitol 70 % solution 30 mL  30 mL Oral DAILY PRN  
 sodium chloride (NS) flush 5-40 mL  5-40 mL IntraVENous Q8H  
 sodium chloride (NS) flush 5-40 mL  5-40 mL IntraVENous PRN  
 acetaminophen (TYLENOL) tablet 650 mg  650 mg Oral Q4H PRN  
 ondansetron (ZOFRAN) injection 4 mg  4 mg IntraVENous Q4H PRN  
 albuterol (PROVENTIL VENTOLIN) nebulizer solution 2.5 mg  2.5 mg Nebulization Q2H PRN  
 insulin lispro (HUMALOG) injection   SubCUTAneous QID WITH MEALS  glucose chewable tablet 16 g  4 Tab Oral PRN  
 glucagon (GLUCAGEN) injection 1 mg  1 mg IntraMUSCular PRN  
 dextrose 10% infusion 0-250 mL  0-250 mL IntraVENous PRN  
 methylPREDNISolone (PF) (SOLU-MEDROL) injection 60 mg  60 mg IntraVENous Q6H  
 arformoterol (BROVANA) neb solution 15 mcg  15 mcg Nebulization BID RT  
 budesonide (PULMICORT) 500 mcg/2 ml nebulizer suspension  500 mcg Nebulization BID RT  
 nicotine (NICODERM CQ) 14 mg/24 hr patch 1 Patch  1 Patch TransDERmal DAILY  hydrOXYzine HCl (ATARAX) tablet 25 mg  25 mg Oral QID PRN  
 rosuvastatin (CRESTOR) tablet 20 mg  20 mg Oral DAILY  zolpidem (AMBIEN) tablet 5 mg  5 mg Oral QHS PRN Lab Review:  
 
Recent Labs 10/23/19 
3899 10/23/19 
0039 10/22/19 
2022 10/22/19 
1539 WBC 13.3* 19.8*  --  20.7* HGB 9.3* 13.9 9.9* 11.4* HCT 27.3* 41.7 29.4* 34.5*  
PLT 77* 93*  --  131* Recent Labs 10/23/19 
2052 10/23/19 
1566 10/22/19 
1539  10/21/19 
2345 10/21/19 
4596 * 137 131*   < >  --  135* K 3.5 4.3 3.5   < > 3.5 3.8 CL 98 105 104   < >  --  101 CO2 23 24 19*   < >  --  25  
* 124* 277*   < >  --  190* BUN 44* 50* 44*   < >  --  29* CREA 1.68* 1.78* 1.26*   < >  --  1.28* CA 5.8* 5.2* 5.1*   < >  --  7.6*  
 MG 2.1  --   --   --  1.8 1.8 PHOS 4.5  --   --   --  2.4* 2.2* ALB  --  1.8* 1.9*  --   --   --   
TBILI  --  0.5 0.4  --   --   --   
SGOT  --  181* 65*  --   --   --   
ALT  --  71 25  --   --   --   
INR  --  1.3*  --   --   --   --   
 < > = values in this interval not displayed. Lab Results Component Value Date/Time Glucose (POC) 98 10/22/2019 11:47 PM  
 Glucose (POC) 65 10/22/2019 11:23 PM  
 Glucose (POC) >600 (HH) 10/22/2019 11:22 PM  
 Glucose (POC) 278 (H) 10/22/2019 03:41 PM  
 Glucose (POC) 238 (H) 10/22/2019 07:52 AM  
 
 
  
Assessment / Plan:  
 
Principal Problem: 
 
70 yo hx of HTN, DM, COPD on 2L O2, presented w/ resp failure, hypoxia, COPD, parainfluenza. Complicated by severe shock, anemia, retroperitoneal bleed 1) Hypovolemic shock: due to retroperitoneal bleed. Poor prognosis. Currently on Levophed, Srini gtt, and Vasopressin. Cont IVF, blood products. Pulm following 2) Retroperitoneal bleeding: concern for development of DIC (as a complication of parainfluenza?). S/p IR embolization of lumbar arteries on 10/22. Cont supportive care 3) Acute blood loss anemia/thrombocytopenia: concerning for DIC. S/p 5u RBCs, multiple dose of FFP. Will transfuse plts, give DDAVP. Consider Cryo. Will check coags, fibrinogen, LDH, haptoglobin. 4) Acute resp failure/hypoxia: due to COPD, parainfluenza. Intubated on 10/22. Chest CT neg for PE. Management per [facility] 5) Anuric ARF: due to shock. Will need dialysis. Consult Renal 
 
6) COPD exacerbation/parainfluenza: cont IV steroids, nebs, LABA/ICS, Isauro, Doxy. Was on 2L O2 at home 7) DM type 2: A1C 8.4%. Cont NPH, SSI. Consider insulin gtt if BS worse Total time spent with patient: 45 Minutes (critical care) Care Plan discussed with: Patient, nursing, pulm Discussed:  Care Plan Prophylaxis:  SCD's Disposition:  SNF/LTC 
        
___________________________________________________ Attending Physician: Loretta Koo MD

## 2019-10-23 NOTE — PROGRESS NOTES
[facility] Name: Corby Roues MRN: [id number] : 1945 Hospital: [address] [facility] Date: 10/23/2019 IMPRESSION:  
· Acute on chronic respiratory failure · COPD/ Asthma exacerbation due to parainfluenza · Parainfluenza + · Retroperitoneal bleed · Hypotension PLAN:  
· Full vent support · Srini/Levophed/vasopress · Plts/FFP/blood products given/ordered · Fibrinogen level pending · Protamine given yesterday · Calcium repleated again · Appears to be a systemic issue of lack of coagulation at this point. Parainfluenza triggered DIC? · Wean  IV steroids · Pulmicort/Brovana · xopenex · atrovent · Cardiology following for atrial tach- can't give metoprolol due to hypotension · Continue Doxy · Mucinex · BG control · External patton · Updated daughter/extended family- they understand how ill she is and wants to keep going with current aggressive measures · Discussed with Dr. Nazanin Barrios 
  
 
Pt critically ill, on multiple pressors and at great risk for cardiac arrest. cctime 55 min outside procedures Subjective/Interval History:  
I have reviewed the flowsheet and previous days notes. Overnight events: 
Acute increased pressor requirement overnight, very quickly requiring Levophed 200, Srini 300 and vasopressin 0.04. Intubated Hgb initially stable, but now decreasing again. Abdomen more distented A-line attempted with good arterial flash, but unable to thread and maintain flash and waveform. Patient still with purposeful movements overnight. Sitting up bed and requiring fentanyl and precedex Clear lungs on CXR Objective:  
Vital Signs:   
Visit Vitals BP (!) 46/20 Pulse (!) 160 Temp (P) 99.2 °F (37.3 °C) Resp 20 Ht 5' 1\" (1.549 m) Wt 69.1 kg (152 lb 6.4 oz) SpO2 (!) 65% BMI 28.80 kg/m² O2 Device: Endotracheal tube, Ventilator O2 Flow Rate (L/min): 3 l/min Temp (24hrs), Av.8 °F (36.6 °C), Min:96 °F (35.6 °C), Max:99.2 °F (37.3 °C) Intake/Output:  
Last shift:      No intake/output data recorded. Last 3 shifts: 10/21 190 - 10/23 0700 In: 9328.9 [P.O.:120; I.V.:7938.9] Out: 1242 [CZEOB:7261] Intake/Output Summary (Last 24 hours) at 10/23/2019 5478 Last data filed at 10/23/2019 0400 Gross per 24 hour Intake 5192.21 ml Output 342 ml Net 4850.21 ml Physical Exam: 
 
General: []Ill appearing [x] INtubated [] []No acute distress [] [] HEENT: []Icteric [x]PERRL []  
 [x]Anicteric Mucosa:[]moist   []dry [] Resp: []Wheeze [x]Clear to ascultation bilaterally []Accessory muscle use  
 []Rales []Chest tube:  []Air leak [] []Ronchi []Crepitus []  
[]  
  
CV: [x]Regular [x]Tachycardia []tachy  
 []Irregular []Bradycardic []  
 []Murmur []S3 [] []Edema []S4 []  
  
GI: []Soft  []NG Tube Bowel sounds: []present [x]absent []Firm []PEG []  
 [x]Distended  []  
  
: []Ewing []Hematuria [] []Clear urine [x]Edema [] MSK:  
 []SCDs [x]Edema [] []No deformities [] []  
  
Skin: [x]Warm []Dry  [] []Cool []Moist [] []Hot  []Diaphoretic [] []Rash  []Cyanosis [] N-psych: [x]Sedated  [x]Oriented []Agitated []Alert  Follows commands:  
[x]Yes  []No [] Devices: [x]PICC []Trach []Chest tube: Air leak? []Y/[]N  
 []Central Line []PA Catheter [] DATA: 
Labs: 
Recent Labs 10/23/19 
6565 10/23/19 
0039 10/22/19 
2022 10/22/19 
1539 WBC 13.3* 19.8*  --  20.7* HGB 9.3* 13.9 9.9* 11.4* HCT 27.3* 41.7 29.4* 34.5*  
PLT 77* 93*  --  131* Recent Labs 10/23/19 
6653 10/23/19 
3350 10/22/19 
1539  10/21/19 
2345 10/21/19 
0921 * 137 131*   < >  --  135* K 3.5 4.3 3.5   < > 3.5 3.8 CL 98 105 104   < >  --  101 CO2 23 24 19*   < >  --  25  
* 124* 277*   < >  --  190* BUN 44* 50* 44*   < >  --  29* CREA 1.68* 1.78* 1.26*   < >  --  1.28* CA 5.8* 5.2* 5.1*   < >  --  7.6*  
MG 2.1  --   --   --  1.8 1.8 PHOS 4.5  --   --   --  2.4* 2.2* ALB  --  1.8* 1.9*  --   --   --   
TBILI  --  0.5 0.4  --   --   --   
SGOT  --  181* 65*  --   --   --   
ALT  --  71 25  --   --   --   
INR  --  1.3*  --   --   --   --   
 < > = values in this interval not displayed. Recent Labs 10/23/19 
0505 10/22/19 
2245 10/22/19 
2125 PH 7.28* 7.13* 7.23* PCO2 45 53* 40  
PO2 118* 157* 158* HCO3 21* 17* 17* FIO2 40 40 35 Imaging: 
[x]I have personally reviewed the patients radiographs 
[x]Radiographs reviewed with radiologist 
 []No change from prior, tubes and lines in adequate position []Improved   []Worsening Josue Aquino MD 
[facility]

## 2019-10-23 NOTE — PROGRESS NOTES
Spiritual Care Assessment/Progress Note [address] 
 
 
NAME: Lord Patel      MRN: [id number] AGE: 69 y.o. SEX: female Mormonism Affiliation: Jainism  
Language: English  
 
10/22/2019     Total Time (in minutes): 23 Spiritual Assessment begun in [facility] 3 INTENSIVE CARE through conversation with: 
  
    []Patient        [x] Family    [] Friend(s) Reason for Consult: Request by staff Spiritual beliefs: (Please include comment if needed) [x] Identifies with a wang tradition:  Jessica Box    
   [] Supported by a wang community:        
   [] Claims no spiritual orientation:       
   [] Seeking spiritual identity:            
   [] Adheres to an individual form of spirituality:       
   [] Not able to assess:                   
 
    
Identified resources for coping:  
   [x] Prayer                           
   [] Music                  [] Guided Imagery [x] Family/friends                 [] Pet visits [] Devotional reading                         [] Unknown 
   [] Other:                                      
 
 
Interventions offered during this visit: (See comments for more details) Patient Interventions: Prayer (actual), Initial/Spiritual assessment, Critical care Family/Friend(s): Affirmation of wang, Affirmation of emotions/emotional suffering, Iconic (affirming the presence of God/Higher Power), Initial Assessment, Prayer (actual), Normalization of emotional/spiritual concerns, Coping skills reviewed/reinforced, Catharsis/review of pertinent events in supportive environment Plan of Care: 
 
 [x] Support spiritual and/or cultural needs  
 [] Support AMD and/or advance care planning process    
 [] Support grieving process 
 [] Coordinate Rites and/or Rituals  
 [] Coordination with community clergy [] No spiritual needs identified at this time 
 [] Detailed Plan of Care below (See Comments)  [] Make referral to Music Therapy [] Make referral to Pet Therapy    
[] Make referral to Addiction services 
[] Make referral to [facility] 
[] Make referral to Spiritual Care Partner 
[] No future visits requested       
[] Follow up visits as needed Comments: Spiritual Care was called in by ICU staff to provide support to Ms. Bailee Zepeda' family as Ms. Bailee Solorio medical condition is critical and unstable. Several family members were present including her daughters, son, daughter-in-law, son-in-law, and granddaughter. They were tearful as they shared about her situation. They are holding onto hope for her recovery, noting that she is a fighter. They are not regular [employer] attenders, but wang is a coping resource for them. I provided active listening and an empathic presence and prayed with them. I also encouraged them to reach out to Spiritual Care at any time if needed. They were appreciative of the support. Spiritual Care remains available as needed. . MA[facility], [address]

## 2019-10-23 NOTE — CONSULTS
Consult dictated Oliguria Severe septic shock RP hematoma ? DIC Rec: 
No acute indication HD Get access as she is high risk to require RRT CVVH if needed

## 2019-10-23 NOTE — DIABETES MGMT
[facility] Progress Note Recommendations/ Comments: Chart reviewed for variable BG. Pt is on steroids, methylprednisolone 60 mg every 6 hours. Noted NPH increased today to 15 units BID. If appropriate, please consider changing correction scale to high sensitivity due to renal status. Current hospital DM medication:  
Lispro normal sensitivity correction scale NPH 15 units BID Chart reviewed on Berenice Fabian. Patient is a 69 y.o. female with known Type 2 Diabetes on oral agent (monotherapy): Januvia 100 mg daily 
insulin injections: Lantus : 14-16 units daily at home. A1c:  
Lab Results Component Value Date/Time Hemoglobin A1c 8.4 (H) 10/18/2019 08:56 PM  
 
 
Recent Glucose Results:  
Lab Results Component Value Date/Time  (H) 10/23/2019 04:37 AM  
  (H) 10/23/2019 12:39 AM  
  (H) 10/22/2019 03:39 PM  
 GLUCPOC 349 (H) 10/23/2019 09:44 AM  
 GLUCPOC 98 10/22/2019 11:47 PM  
 GLUCPOC 65 10/22/2019 11:23 PM  
  
 
Lab Results Component Value Date/Time Creatinine 1.68 (H) 10/23/2019 04:37 AM  
 
Estimated Creatinine Clearance: 26.5 mL/min (A) (based on SCr of 1.68 mg/dL (H)). Active Orders Diet DIET NPO  
  
 
PO intake:  
Patient Vitals for the past 72 hrs: 
 % Diet Eaten 10/22/19 0830 0 % 10/20/19 1640 25 % Will continue to follow as needed. Thank you Lupe Nixon RD, CDE Time spent: 7 min

## 2019-10-23 NOTE — PROGRESS NOTES
Palliative Medicine [facility]: [phone] ([phone]) [facility]: [phone] ([phone]) Code Status: Full Code Advance Care Planning: 
Advance Care Planning 10/23/2019 Patient's Healthcare Decision Maker is: Legal Next of Kin Confirm Advance Directive None Patient Would Like to Complete Advance Directive Unable Primary Decision Maker: Chad MEDINA) - Daughter - [phone] Primary Decision Maker: Rosanna MEDINA) - Daughter - [phone] Primary Decision Maker: Kamryn MEDINA) - Son - [phone] Primary Decision Maker: Gurinder MEDINA) - Daughter - [phone] Patient / Family Encounter Documentation Participants (names): Patient family including 3 adults children/LNOK Sandi Glaser Hair Segun/LILLIE Decker and Boyd Raissa ([place]). Daughter/LNMALI Reeder is at home caring for patient mother. Patient grandchildren and children also present; Palliative Medicine (Archana Giordano NP, Trudy Lombard, RN) Narrative:  
 
Lizz Skaggs NP and I met with patient family in seating area outside ICU. Patient on full vent support and multiple pressors in ICU. Family affect was engaged, trusting, sometimes rambunctious talking over each other at times, but respectful and loving with deep pride in their family unit. They used humor and connection to each other and team to cope. 1. Introduced Palliative Medicine service 2. Got to know family who is very loving, tight knit and vocal. Patient has 4 adult children who are LNOK and they rely heavily on daughter in law Caryle Leeds to translate complex medical terms. However, patient children should be main point of contact and primary decision makers for patient care 3. Family doesn't have questions or concerns right now. They understand patient is critically ill and feel they need to watch and wait - perhaps patient can build up enough strength to make progress. 4. We talked about who patient is as a person. They say patient is a \"fighter\" and \"family oriented. \"  She seems to be the nucleus of this very extended family. 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 She always has presents for all the kids, grandkids, great grandkids family friends - whoever was visiting her. Very generous and loving. She likes Odis Litten, [employer], boxing and [employer]. Family joked that president should not be brought up around her but then again it might make her blood boil enough to get her out of bed. 5. See Kt Smith NPs note. Psychosocial Issues Identified:  
 
Patient has extensive/loving family support. 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 Patient seems to be matriarch/nucleous/binder of family -- if she continue to decline, family will need a lot of support in facing difficult decisions that could involve letting go Goals of Care / Plan:  
 
Family aware of how critical patient is. They feel she needs more time to build strength and fight. Full restorative measures/full code. Will continue to follow for support. Thank you for the opportunity to be involved in the care of Ms. Parker Reynoso and her family. Lennox Restrepo, SW, Supervisee in Social Work Palliative Medicine  [phone]

## 2019-10-23 NOTE — PROGRESS NOTES
Pharmacy Dosing Note Consult received from Dr. Heaven Prabhakar: please dose DDAVP once to help with bleeding Will place order for desmopressin 20 mcg in NS 50 mL IVPB over 30 minutes (0.3 mcg/kg = 20.72 mcg with dose typically capped at 20 mcg and within 10%) Thank you, 
Dilan Murphy, PHARMD

## 2019-10-23 NOTE — PROGRESS NOTES
0700- Bedside and Verbal shift change report given to [facility] (oncoming nurse) by Chris Arechiga (offgoing nurse). Report included the following information SBAR, Kardex, Intake/Output, MAR, Recent Results and Cardiac Rhythm SInus tach. Primary Nurse Sierra Marmolejo and LINUS Hernandez performed a dual skin assessment on this patient Impairment noted- see wound doc flow sheet Paul score is see flow sheet. 9197- Consent obtained from patient daughter for femoral aterial line, consent placed in chart. 26- Dr. Bea Cordero present at bedside attempting A Line. Orders for one unit platelets Dr. Bea Cordero. 0800- Attempt for a line by Dr. Bea Cordero, unsuccessful at this time. Very large hematoma present at L groin site from a line attempt. No signs of any active bleeding. Patient assessment completed, see flow sheet. Patient is intubated and sedated. Eyes open to pain, no command following. Pupils equal and reactive to light. Heart rate regular, tachycardia on monitor, HR ranging from 140-150s. Lungs with wheezing. On vent, 19 at gums, FiO2 40% PEEP 5, RR 20, TV mid to upper 300s. SpO2 100% at this time with poor pleth. Forehead probe changed and MD updated. Bowel sounds hypoactive and distant. ABd is distended with large bruise present. Patton present and draining blood tinged urine, minimal output, Dr. Bea Cordero updated. RUE 4+ pitting edema, weeping, large bruise spreading from upper to lower arm. LUE with 1 non pitting edema, PICC line present. BLE with trace edema, pulses absent, feet cold with mottling of toes. Patient maxed on multiple pressors. PRBC running. Will continue to monitor closely. 8863- Dr. Henna Najera present at bedside. 0820- attempted peripheral stick for IV and labs by Wu Kennedy RN. unable to obtain. Labs drawn from PICC line. 0829- PRBC unit completed. 3030- FFP started, dual verifications with Lyla BARRIGA.  
 
3127- Dr. Bea Cordero and Dr. Henna Najera updated regarding carito red blood from patton catheter. 0901- FFP completed. 5662- Peripheral IV successful by LINUS Meyer. Bicarb drip restarted in peripheral line. 7649- Patient alert, eyes open, grabbing for tube and lines. precedex drip increased to 0.5. Pt able to follow commands, nodding head appropriately at this time. 0480- Platelets started, dual verification with Lyla BARRIGA.  
 
4749- Dr. Zaina Vasques present at bedside assessing patient. Stated patient not needing dialysis today but possible in future. 0945- Platelets completed. 2647- Labs drawn and sent. 1030- IR called and updated on patient orders for central line. 1043- peripheral IV infiltrated, bicarb drip stopped at this time. Will restart when access is available. 1- Dr. Kris Mcneil updated on need to increase vasopressors again. Call from lab regarding platelet order and need to recheck platelet count after first transfusion, will update Dr. Ponce Signs. 1100- call from blood bank, need to order next dose of platelets and would take approximant 1 hour to arrive to hospital.  
 
1105- Orders to recheck platelet count per Dr. Ponce Signs. 1305 [facility] NP present at bedside assessing patient. 1110- call from IR, stated would be up shortly to place lines. 1120- Dr. Kris Mcneil updated on BP, maxed on 3 pressors, femoral pulse palpable. Orders to recheck H&H with platelet count and recheck lactic. 1130- reassessment completed see flow sheet. 1140- IR team present at bedside, clarified with Dr. Kris Mcneil, wants both central line and HD cath placed. 1150- Dr. Kris Mcneil updated with Hgb recheck orders for 1 unit PRBC given via pressure bag.  
 
1204- Call from lab with critical lactic of 2.2. Will update Dr. Ponce Signs. See flow sheet. 56- Dr. Kris Mcneil updated on critical lab. Clarification on order of blood products. Dr. Kris Mcneil stated would like RBC, cryo, platelets. Will call blood bank and update. 1230- IR team stated lines are in, awaiting for cxray for confirmation of line. 1235- unit of PRBC started. Used with pressure bag. Dual verification with Dee Merida. 1245- xray tech present at bedside. 1300- Family present at bedside, updated on patient. All questions answered to satisfaction, no needs at this time. 1312- Xray confirmation of placement of CVC and HD cath. 1350- PRBC completed. 1400- mouth and oral care provided. Patient repositioned and turned in bed. 1435- one unit of cryo started, hang to gravity per Dr. Manny Kraus, dual verification with Mara Parry RN.  
 
0174- Per Bina BARRIGA, pt with upper extremity jerking like movements. Will updated Dr. Manny Kraus. 1- Dr. Manny Kraus present at bedside assessing patient. Updated on difficulty obtaining accurate SpO2 level and jerking movements, anticipate new orders. Will continue to monitor closely. 1520- Cryo transfusion completed. [address] drawn and sent. 1550- RT present at bedside to obtain ABG. 1555- 2nd Platelets started, dual verfication with LINUS Jones. 
 
1600- Reassessment completed, see flowsheet. ABG reviewed with . 1620- Call from lab with critical lactic level of 2.9. Will update Dr. Manny Kraus. Platelets completed. 36- Dr. Manny Kraus updated on lactic level and scheduled 15 units of NPH and recent blood glucose. Orders to hold this dose of NPH and reassess at 6pm for sliding scale. 65- Dr. Manny Kraus updated on patient SBP maintaining below, present femoral pulse, but weaker than previous hour and increased softness and distention in abd. Orders for albumin at this time. Will continue to monitor closely. 56- Dr. Manny Kraus present at bedside, updated on SBP less than 70, not able to obtain pulse ox. And sustained SVT. HOB flat, pt placed in trendelenburg positioning. New orders for PRBC.  
 
1820- PRBC unit started via pressure bag, dual verification of transfusion with LINUS Masters. 1830- Family present at bedside, updated on patient. 1850- PRBC unit completed. 1900- Bedside and Verbal shift change report given to LINUS Brown (oncoming nurse) by Lindsay Kan (offgoing nurse). Report included the following information SBAR, Kardex, Intake/Output, MAR and Recent Results.

## 2019-10-24 NOTE — PROGRESS NOTES
Palliative Medicine Consult Pedrito: [id number] ([id number]) Patient Name: Jaylen Ramesh YOB: 1945 Date of Initial Consult: 10/23/2019 Reason for Consult: Care decisions Requesting Provider: Fatuma Muro Primary Care Physician: Shonda Ceballos MD 
 
 SUMMARY:  
Jaylen Ramesh is a 69 y.o. with a past history of COPD, asthma, DM, HTN, lupus, and tobacco abuse, who was admitted on 10/18/2019 from home with a diagnosis of COPD exacerbation. Patient presented to the ED with complaints of shortness of breath. ED eval revealed increased work of breathing on exam tachycardia, and chest CT with no acute process. Stable right upper lobe pulmonary nodules. Patient admitted for IV steroids, antibiotics, and supplemental oxygen/bipap. RVP positive for parainfluenza. Patient continued to be tachycardic and plan for CTA to rule out PE delayed due to poor IV access. Patient was empirically started on therapeutic Lovenox while awaiting CTA. CTA performed 10/21 and showed No acute process or evidence of pulmonary embolism. Yesterday (10/22), patient developed acute onset of back and abdominal pain with associated hy[otension. Vasopressor support was initiated and cbc with drop in hemoglobin (11.0-->7. 8). CT abdomen revealed large right-sided retroperitoneal hematoma with evidence of active contrast extravasation. IR consulted and arteriogram showed vascular irregularity in the right retroperitoneum fed by branches of the right L3 and L4 lumbar arteries. These arteries were embolized with microcoils. Continued hemodynamic monitoring, fluid and packed RBC treatment and serial H&H is recommended. Overnight, patient condition continued to deteriorate, was intubated, oliguric, and currently on max dose of vasopressor support. Has received a total of 7 PRBCs, 3 FFP, and 1 dose of platelets. 2 of cryo ordered by transfused yet. Plan for central line and HD cath placement today. Current medical issues leading to Palliative Medicine involvement include: care decisions. SH: , has three daughters and son Krystina Meyer, Susan Gutierrez, and Raul Older). One son is . Patient resides with her elderly mother and her daughter with disability. Interim history: 
10/24--> Continues on triple pressor support, intubated and sedated. Lactate continues to rise. Labs am labs revealed transaminitis consistent with shock liver. Started on insulin drip for hyperglycemia. Oliguric but per renal no RRT yet. Per bedside nurse, is alert at times and follows commands. Received three additional units of PRBCs, and I dose of platelets overnight (total transfusions: 10 units PRBC, 3 FFP, 2 plts, 2 cryo). Attempting to wean levophed first.  
 PALLIATIVE DIAGNOSES:  
1. Physical debility 2. Goals of care 3. ACP 4. Severe shock 5. Respiratory failure s/p intubation 10/22 6. Retroperitoneal hematoma- s/p embolization 10/22 7. Acute COPD exacerbation- likely triggered by parainfluenza virus PLAN:  
1. Met with patient and then with daughters Lian Cochran and Julian Boland, and other family members in waiting room. 2. Patient intubated and sedated on fentanyl. Slowly weaning levophed (down from 200 mcg/min to 165 mcg/min currently). Remains on max dose of neosynephrine and on vasopressin. 3. Goals of care--> Discussed events from last 24 hours. Family understands that continued hypotension causing liver and kidney injury, that we are making small gains in attempting at weaning pressors off but still have serious concerns with ongoing bleeding and need for multiple transfusion. Family relieved that patient does not need dialysis yet and remain encouraged by the fact that patient has been more alert and interactive at times with them overnight. While recognizing her critical condition and that they were told that they will \"need a miracle\" for patient to survive, they report that that is what they are hoping for. Will continue to follow. 4. Code status--> FULL CODE. Has been addressed by multiple providers, family clear on wishes for full restorative measures in an effort to recover. 5. ACP--> No AMD on file. Legal next of kin is surrogate decision (three daughters and son). 6. Initial consult note routed to primary continuity provider and/or primary health care team members 7. Communicated plan of care with: Palliative IDT, [address] Team 
 
 GOALS OF CARE / TREATMENT PREFERENCES:  
 
GOALS OF CARE: 
Patient/Health Care Proxy Stated Goals: Prolong life TREATMENT PREFERENCES:  
Code Status: Full Code Advance Care Planning: 
[x] The [facility] Interdisciplinary Team has updated the ACP Navigator with [place] and Patient Capacity Primary Decision Maker: Shukri MEDINA) - Daughter - [phone] Primary Decision Maker: Aliza Reddcarolyn MEDINA) - Daughter - [phone] Primary Decision Maker: Lonell Smoker  CASSIDY MEDINA) - Son - [phone] Primary Decision Maker: Ace Bennie BENJAMINPRISCA MEDINA) - Daughter - [phone] Advance Care Planning 10/23/2019 Patient's Healthcare Decision Maker is: Legal Next of Kin Confirm Advance Directive None Patient Would Like to Complete Advance Directive Unable Medical Interventions: Full interventions Other Instructions: Other: As far as possible, the palliative care team has discussed with patient / health care proxy about goals of care / treatment preferences for patient. HISTORY:  
 
History obtained from: family, chart CHIEF COMPLAINT: shortness of breath HPI/SUBJECTIVE: The patient is:  
[] Verbal and participatory [x] Non-participatory due to: intubated and sedated. Patient intubated and sedated. Remains profoundly hypotensive despite multiple blood transfusions and max pressor support. 10/24--> Patient intubated and sedated on fentanyl.  Slowly weaning levophed (down from 200 mcg/min to 165 mcg/min currently). Remains on max dose of neosynephrine and on vasopressin. Clinical Pain Assessment (nonverbal scale for severity on nonverbal patients):  
Clinical Pain Assessment Severity: 0 Activity (Movement): Lying quietly, normal position Duration: for how long has pt been experiencing pain (e.g., 2 days, 1 month, years) Frequency: how often pain is an issue (e.g., several times per day, once every few days, constant) FUNCTIONAL ASSESSMENT:  
 
Palliative Performance Scale (PPS): PPS: 10 PSYCHOSOCIAL/SPIRITUAL SCREENING:  
 
Palliative IDT has assessed this patient for cultural preferences / practices and a referral made as appropriate to needs (Cultural Services, Patient Advocacy, Ethics, etc.) Any spiritual / Zoroastrian concerns: 
[] Yes /  [x] No 
 
Caregiver Burnout: 
[] Yes /  [x] No /  [] No Caregiver Present Anticipatory grief assessment:  
[x] Normal  / [] Maladaptive ESAS Anxiety: ESAS Depression:    
 
 
 REVIEW OF SYSTEMS:  
 
Positive and pertinent negative findings in ROS are noted above in HPI. The following systems were [x] reviewed / [] unable to be reviewed as noted in HPI Other findings are noted below. Systems: constitutional, ears/nose/mouth/throat, respiratory, gastrointestinal, genitourinary, musculoskeletal, integumentary, neurologic, psychiatric, endocrine. Positive findings noted below. Modified ESAS Completed by: provider Drowsiness: 10 Pain: 0 Dyspnea: 0 Stool Occurrence(s): 1 PHYSICAL EXAM:  
 
From RN flowsheet: 
Wt Readings from Last 3 Encounters:  
10/21/19 152 lb 6.4 oz (69.1 kg) Blood pressure 130/64, pulse (!) 154, temperature 99.6 °F (37.6 °C), resp. rate 16, height 5' 1\" (1.549 m), weight 152 lb 6.4 oz (69.1 kg), SpO2 100 %. Pain Scale 1: Adult Nonverbal Pain Scale Pain Intensity 1: 0 Pain Location 1: Abdomen Pain Orientation 1: Anterior, Posterior Pain Description 1: Constant Pain Intervention(s) 1: Repositioned Last bowel movement, if known:  
 
Constitutional: intubated and sedated Eyes: pupils equal, anicteric ENMT: no nasal discharge, dry mucous membranes Cardiovascular: regular rhythm, distal pulses faint Respiratory: breathing not labored, symmetric Gastrointestinal: soft non-tender, +bowel sounds Musculoskeletal: no deformity, no tenderness to palpation, extremities cool to touch Skin: warm, dry Neurologic: sedated Psychiatric: unable to assess Other: 
 
 
 HISTORY:  
 
Principal Problem: COPD with acute exacerbation ([address] (10/18/2019) Active Problems: 
  DM type 2 (diabetes mellitus, type 2) ([address] (10/18/2019) Essential hypertension (10/18/2019) Acute on chronic respiratory failure with hypoxia ([address] (10/18/2019) Dyslipidemia (10/19/2019) Lupus disease of lung ([address] (10/19/2019) Parainfluenza infection (10/21/2019) Physical debility () Goals of care, counseling/discussion () 
 
  ACP (advance care planning) () Past Medical History:  
Diagnosis Date  Asthma  Chronic obstructive pulmonary disease ([address]  Diabetes ([address]  History of vascular access device 10/21/2019 [facility] VAT 5 FR dual PICC for access L basilic  Hypertension  Lupus ([address] Past Surgical History:  
Procedure Laterality Date  HX HERNIA REPAIR    
 IR INSERT NON TUNL CVC OVER 5 YRS  10/23/2019  IR INSERT NON TUNL CVC OVER 5 YRS  10/23/2019  IR OCCL TXCATH HEMMORAGE W SI  10/22/2019 Family History Problem Relation Age of Onset  Coronary Artery Disease Son  Stroke Son History reviewed, no pertinent family history. Social History Tobacco Use  Smoking status: Current Every Day Smoker Packs/day: 2.00 Years: 57.00 Pack years: 114.00  Smokeless tobacco: Never Used Substance Use Topics  Alcohol use: Not Currently Allergies Allergen Reactions  Chocolate [Cocoa] Unknown (comments)  Demerol [Meperidine] Hives  Other Plant, Animal, Environmental Other (comments) Numerous environmental allergies  Tape [Adhesive] Contact Dermatitis Current Facility-Administered Medications Medication Dose Route Frequency  0.9% sodium chloride infusion 250 mL  250 mL IntraVENous PRN  
 0.9% sodium chloride infusion 250 mL  250 mL IntraVENous PRN  
 insulin regular (NOVOLIN R, HUMULIN R) 100 Units in 0.9% sodium chloride 100 mL infusion  0-50 Units/hr IntraVENous TITRATE  insulin lispro (HUMALOG) injection   SubCUTAneous TIDAC  glucose chewable tablet 16 g  4 Tab Oral PRN  
 glucagon (GLUCAGEN) injection 1 mg  1 mg IntraMUSCular PRN  
 meropenem (MERREM) 500 mg in 0.9% sodium chloride (MBP/ADV) 50 mL  0.5 g IntraVENous Q12H  
 0.9% sodium chloride infusion 250 mL  250 mL IntraVENous PRN  
 0.9% sodium chloride infusion 250 mL  250 mL IntraVENous PRN  
 ipratropium (ATROVENT) 0.02 % nebulizer solution 0.5 mg  0.5 mg Nebulization Q8H RT  
 levalbuterol (XOPENEX) nebulizer soln 1.25 mg/3 mL  1.25 mg Nebulization Q8H RT  
 0.9% sodium chloride infusion 250 mL  250 mL IntraVENous PRN  
 fentaNYL (PF) 1,500 mcg/30 mL (50 mcg/mL) infusion  0-200 mcg/hr IntraVENous CONTINUOUS  
 fentaNYL citrate (PF) injection 25 mcg  25 mcg IntraVENous Q2H PRN  
 dexmedeTOMidine in 0.9 % NaCl (PRECEDEX) 400 mcg/100 mL (4 mcg/mL) infusion soln  0.2-0.7 mcg/kg/hr IntraVENous TITRATE  
 0.9% sodium chloride infusion 250 mL  250 mL IntraVENous PRN  
 famotidine (PF) (PEPCID) 20 mg in sodium chloride 0.9% 10 mL injection  20 mg IntraVENous Q24H  
 0.9% sodium chloride infusion 250 mL  250 mL IntraVENous PRN  
 insulin lispro (HUMALOG) injection   SubCUTAneous Q6H  
 0.9% sodium chloride infusion 250 mL  250 mL IntraVENous PRN  
 0.9% sodium chloride infusion 250 mL  250 mL IntraVENous PRN  theophylline ER (DAVID-24) capsule 200 mg  200 mg Oral Q24H  
 HYDROcodone-acetaminophen (NORCO) 7.5-325 mg per tablet 1 Tab  1 Tab Oral QID PRN  
 HYDROmorphone (DILAUDID) syringe 0.5 mg  0.5 mg IntraVENous Q4H PRN  
 PHENYLephrine (HUNTER-SYNEPHRINE) 100 mg in 0.9% sodium chloride 250 mL infusion   mcg/min IntraVENous TITRATE  
 NOREPINephrine (LEVOPHED) 32 mg in dextrose 5% 250 mL infusion  2-200 mcg/min IntraVENous TITRATE  vasopressin (VASOSTRICT) 20 Units in 0.9% sodium chloride 100 mL infusion  0.04 Units/min IntraVENous CONTINUOUS  
 sodium bicarbonate (8.4%) 150 mEq in dextrose 5% 1,000 mL infusion   IntraVENous CONTINUOUS  
 0.9% sodium chloride infusion 250 mL  250 mL IntraVENous PRN  
 alteplase (CATHFLO) 1 mg in sterile water (preservative free) 1 mL injection  1 mg InterCATHeter PRN  
 ALPRAZolam (XANAX) tablet 0.25 mg  0.25 mg Oral Q8H PRN  
 influenza vaccine 2019-20 (6 mos+)(PF) (FLUARIX/FLULAVAL/FLUZONE QUAD) injection 0.5 mL  0.5 mL IntraMUSCular PRIOR TO DISCHARGE  guaiFENesin ER (MUCINEX) tablet 1,200 mg  1,200 mg Oral Q12H  sorbitol 70 % solution 30 mL  30 mL Oral DAILY PRN  
 sodium chloride (NS) flush 5-40 mL  5-40 mL IntraVENous Q8H  
 sodium chloride (NS) flush 5-40 mL  5-40 mL IntraVENous PRN  
 acetaminophen (TYLENOL) tablet 650 mg  650 mg Oral Q4H PRN  
 ondansetron (ZOFRAN) injection 4 mg  4 mg IntraVENous Q4H PRN  
 albuterol (PROVENTIL VENTOLIN) nebulizer solution 2.5 mg  2.5 mg Nebulization Q2H PRN  
  glucose chewable tablet 16 g  4 Tab Oral PRN  
 glucagon (GLUCAGEN) injection 1 mg  1 mg IntraMUSCular PRN  
 dextrose 10% infusion 0-250 mL  0-250 mL IntraVENous PRN  
 methylPREDNISolone (PF) (SOLU-MEDROL) injection 60 mg  60 mg IntraVENous Q6H  
 arformoterol (BROVANA) neb solution 15 mcg  15 mcg Nebulization BID RT  
 budesonide (PULMICORT) 500 mcg/2 ml nebulizer suspension  500 mcg Nebulization BID RT  
 nicotine (NICODERM CQ) 14 mg/24 hr patch 1 Patch  1 Patch TransDERmal DAILY  hydrOXYzine HCl (ATARAX) tablet 25 mg  25 mg Oral QID PRN  
 rosuvastatin (CRESTOR) tablet 20 mg  20 mg Oral DAILY  zolpidem (AMBIEN) tablet 5 mg  5 mg Oral QHS PRN  
 
 
 
 LAB AND IMAGING FINDINGS:  
 
Lab Results Component Value Date/Time WBC 8.2 10/24/2019 04:00 AM  
 HGB 6.2 (L) 10/24/2019 02:40 PM  
 PLATELET 335 (L) 75/12/0976 04:00 AM  
 
Lab Results Component Value Date/Time Sodium 132 (L) 10/24/2019 11:54 AM  
 Potassium 4.7 10/24/2019 11:54 AM  
 Chloride 98 10/24/2019 11:54 AM  
 CO2 25 10/24/2019 11:54 AM  
 BUN 53 (H) 10/24/2019 11:54 AM  
 Creatinine 2.64 (H) 10/24/2019 11:54 AM  
 Calcium 7.2 (L) 10/24/2019 11:54 AM  
 Magnesium 1.9 10/24/2019 11:54 AM  
 Phosphorus 7.4 (H) 10/24/2019 08:52 AM  
  
Lab Results Component Value Date/Time AST (SGOT) >2,000 (H) 10/24/2019 04:00 AM  
 Alk. phosphatase 46 10/24/2019 04:00 AM  
 Protein, total 3.6 (L) 10/24/2019 04:00 AM  
 Albumin 2.3 (L) 10/24/2019 04:00 AM  
 Globulin 1.3 (L) 10/24/2019 04:00 AM  
 
Lab Results Component Value Date/Time INR 1.7 (H) 10/24/2019 04:00 AM  
 Prothrombin time 17.2 (H) 10/24/2019 04:00 AM  
 aPTT 64.0 (H) 10/24/2019 04:00 AM  
  
No results found for: IRON, FE, TIBC, IBCT, PSAT, FERR Lab Results Component Value Date/Time pH 7.21 (LL) 10/24/2019 06:35 AM  
 PCO2 53 (H) 10/24/2019 06:35 AM  
 PO2 91 10/24/2019 06:35 AM  
 
No components found for: [place] Lab Results Component Value Date/Time  (H) 10/23/2019 12:39 AM  
 CK - MB 10.5 (H) 10/23/2019 12:39 AM  
  
 
 
   
 
Total time: 25 
Counseling / coordination time, spent as noted above: 20 
> 50% counseling / coordination?: yes Prolonged service was provided for  []30 min   []75 min in face to face time in the presence of the patient, spent as noted above. Time Start:  
Time End:  
Note: this can only be billed with 59364 (initial) or 04314 (follow up). If multiple start / stop times, list each separately.

## 2019-10-24 NOTE — DIABETES MGMT
[facility] Progress Note Recommendations/ Comments: Review for hyperglycemia on IV steroids. Pt has been started on an insulin drip at 0900 and currently running at 5.3 units per hour. Will continue to follow. Current hospital DM medication: insulin drip Chart reviewed on [facility]. Patient is a 69 y.o. female with known Type 2 Diabetes on oral agent (monotherapy): Januvia 100 mg daily 
insulin injections: Lantus : 14-16 units daily at home. A1c:  
Lab Results Component Value Date/Time Hemoglobin A1c 8.4 (H) 10/18/2019 08:56 PM  
 
 
Recent Glucose Results:  
Lab Results Component Value Date/Time  (H) 10/24/2019 08:52 AM  
  (H) 10/24/2019 04:00 AM  
  (H) 10/23/2019 03:35 PM  
 GLUCPOC 238 (H) 10/24/2019 10:40 AM  
 GLUCPOC 210 (H) 10/24/2019 09:41 AM  
 GLUCPOC 211 (H) 10/24/2019 06:00 AM  
  
 
Lab Results Component Value Date/Time Creatinine 2.55 (H) 10/24/2019 08:52 AM  
 
Estimated Creatinine Clearance: 17.5 mL/min (A) (based on SCr of 2.55 mg/dL (H)). Active Orders Diet DIET NPO  
  
 
PO intake:  
Patient Vitals for the past 72 hrs: 
 % Diet Eaten 10/22/19 0830 0 % Will continue to follow as needed. Thank you Time spent: 3 min

## 2019-10-24 NOTE — PROGRESS NOTES
[facility] [address] Lopez YOB: 1945 Assessment & Plan: 1. Severe Oliguric STEPHANIE 
- Poor urine 
- cr,k worse - 7 lit+ve but sats ok 2. Hyponatremia 
- hypervolemic, STEPHANIE related 3. Shock 
- multiple pressures 4. Anemia - sp PRBC 5. Resp failure 
- vent PLAN 1. No HD/CRRRT yet 2. Lyell Alexia 3. Monitor K 
4. DW  Subjective:  
CC:arf HPI: Patient seen STEPHANIE is severe,oliguric, k,cr worse On multiple pressures Vent ROS:not available due to intubation Current Facility-Administered Medications Medication Dose Route Frequency  0.9% sodium chloride infusion 250 mL  250 mL IntraVENous PRN  
 calcium chloride 1 g in 0.9% sodium chloride 250 mL IVPB  1 g IntraVENous ONCE  
 0.9% sodium chloride infusion 250 mL  250 mL IntraVENous PRN  
 insulin regular (NOVOLIN R, HUMULIN R) 100 Units in 0.9% sodium chloride 100 mL infusion  0-50 Units/hr IntraVENous TITRATE  insulin lispro (HUMALOG) injection   SubCUTAneous TIDAC  glucose chewable tablet 16 g  4 Tab Oral PRN  
 glucagon (GLUCAGEN) injection 1 mg  1 mg IntraMUSCular PRN  
 meropenem (MERREM) 500 mg in 0.9% sodium chloride (MBP/ADV) 50 mL  0.5 g IntraVENous Q12H  
 fentaNYL (PF) 1,500 mcg/30 mL (50 mcg/mL) infusion  0-200 mcg/hr IntraVENous CONTINUOUS  
 fentaNYL citrate (PF) injection 25 mcg  25 mcg IntraVENous Q2H PRN  
 dexmedeTOMidine in 0.9 % NaCl (PRECEDEX) 400 mcg/100 mL (4 mcg/mL) infusion soln  0.2-0.7 mcg/kg/hr IntraVENous TITRATE  
 0.9% sodium chloride infusion 250 mL  250 mL IntraVENous PRN  
 famotidine (PF) (PEPCID) 20 mg in sodium chloride 0.9% 10 mL injection  20 mg IntraVENous Q24H  
 0.9% sodium chloride infusion 250 mL  250 mL IntraVENous PRN  
 insulin lispro (HUMALOG) injection   SubCUTAneous Q6H  
 0.9% sodium chloride infusion 250 mL  250 mL IntraVENous PRN  
 albumin human 25% (BUMINATE) solution 25 g  25 g IntraVENous Q6H  
 0.9% sodium chloride infusion 250 mL  250 mL IntraVENous PRN  
 0.9% sodium chloride infusion 250 mL  250 mL IntraVENous PRN  
 budesonide (PULMICORT) 0.5 mg/2 mL nebulizer suspension  theophylline ER (DAVID-24) capsule 200 mg  200 mg Oral Q24H  
 HYDROcodone-acetaminophen (NORCO) 7.5-325 mg per tablet 1 Tab  1 Tab Oral QID PRN  
 HYDROmorphone (DILAUDID) syringe 0.5 mg  0.5 mg IntraVENous Q4H PRN  
 PHENYLephrine (HUNTER-SYNEPHRINE) 100 mg in 0.9% sodium chloride 250 mL infusion   mcg/min IntraVENous TITRATE  
 NOREPINephrine (LEVOPHED) 32 mg in dextrose 5% 250 mL infusion  2-200 mcg/min IntraVENous TITRATE  vasopressin (VASOSTRICT) 20 Units in 0.9% sodium chloride 100 mL infusion  0.04 Units/min IntraVENous CONTINUOUS  
 sodium bicarbonate (8.4%) 150 mEq in dextrose 5% 1,000 mL infusion   IntraVENous CONTINUOUS  
 0.9% sodium chloride infusion 250 mL  250 mL IntraVENous PRN  
 alteplase (CATHFLO) 1 mg in sterile water (preservative free) 1 mL injection  1 mg InterCATHeter PRN  
 levalbuterol (XOPENEX) nebulizer soln 1.25 mg/3 mL  1.25 mg Nebulization Q4H RT  
 ipratropium (ATROVENT) 0.02 % nebulizer solution 0.5 mg  0.5 mg Nebulization Q4H RT  
 ALPRAZolam (XANAX) tablet 0.25 mg  0.25 mg Oral Q8H PRN  
 influenza vaccine 2019-20 (6 mos+)(PF) (FLUARIX/FLULAVAL/FLUZONE QUAD) injection 0.5 mL  0.5 mL IntraMUSCular PRIOR TO DISCHARGE  guaiFENesin ER (MUCINEX) tablet 1,200 mg  1,200 mg Oral Q12H  sorbitol 70 % solution 30 mL  30 mL Oral DAILY PRN  
 sodium chloride (NS) flush 5-40 mL  5-40 mL IntraVENous Q8H  
 sodium chloride (NS) flush 5-40 mL  5-40 mL IntraVENous PRN  
 acetaminophen (TYLENOL) tablet 650 mg  650 mg Oral Q4H PRN  
 ondansetron (ZOFRAN) injection 4 mg  4 mg IntraVENous Q4H PRN  
 albuterol (PROVENTIL VENTOLIN) nebulizer solution 2.5 mg  2.5 mg Nebulization Q2H PRN  
 glucose chewable tablet 16 g  4 Tab Oral PRN  
 glucagon (GLUCAGEN) injection 1 mg  1 mg IntraMUSCular PRN  
 dextrose 10% infusion 0-250 mL  0-250 mL IntraVENous PRN  
 methylPREDNISolone (PF) (SOLU-MEDROL) injection 60 mg  60 mg IntraVENous Q6H  
 arformoterol (BROVANA) neb solution 15 mcg  15 mcg Nebulization BID RT  
 budesonide (PULMICORT) 500 mcg/2 ml nebulizer suspension  500 mcg Nebulization BID RT  
 nicotine (NICODERM CQ) 14 mg/24 hr patch 1 Patch  1 Patch TransDERmal DAILY  hydrOXYzine HCl (ATARAX) tablet 25 mg  25 mg Oral QID PRN  
 rosuvastatin (CRESTOR) tablet 20 mg  20 mg Oral DAILY  zolpidem (AMBIEN) tablet 5 mg  5 mg Oral QHS PRN Objective:  
 
Vitals: 
Blood pressure 116/61, pulse (!) 154, temperature 99.7 °F (37.6 °C), resp. rate 21, height 5' 1\" (1.549 m), weight 69.1 kg (152 lb 6.4 oz), SpO2 100 %. Temp (24hrs), Av.9 °F (37.2 °C), Min:98.2 °F (36.8 °C), Max:99.9 °F (37.7 °C) Intake and Output: 
10/24 0701 - 10/24 1900 In: -  
Out: 36 [Urine:36] 
10/22 1901 - 10/24 0700 In: 60909.3 [I.V.:5970.1] Out: 431 [JDOCH:321] Physical Exam:              
 Patient is intubated:  yes Physical Examination:  
GENERAL ASSESSMENT: NAD HEENT:Nontraumatic CHEST: CTA HEART: S1S2 ABDOMEN: Soft,NT, 
:[place]:  
EXTREMITY: EDEMA 
NEURO:Grossly non focal 
 
 
   
ECG/rhythm: 
 
Data Review No results for input(s): TNIPOC in the last 72 hours. No lab exists for component: ITNL Recent Labs 10/23/19 
8457 * CKMB 10.5* TROIQ 0.16* Recent Labs 10/24/19 
0400 10/23/19 
2323 10/23/19 
1933 10/23/19 
1535  10/23/19 
1122  10/23/19 
0437 10/23/19 
0039  10/21/19 
2345 *  --   --  135*  --   --   --  129* 137   < >  --   
K 5.0  --   --  4.8  --   --   --  3.5 4.3   < > 3.5 CL 98  --   --  102  --   --   --  98 105   < >  --   
CO2 25  --   --  25  --   --   --  23 24   < >  --   
BUN 54*  --   --  50*  --   --   --  44* 50*   < >  --   
CREA 2.36*  --   --  2.01*  --   --   --  1.68* 1.78*   < >  --   
*  --   --  154*  --   --   --  380* 124*   < >  --   
PHOS 7.0*  --   --   --   --   --   --  4.5  --   --  2.4* MG 2.1  --   --   --   --   --   --  2.1  --   --  1.8  
CA 6.2*  --   --  6.6*  --   --   --  5.8* 5.2*   < >  --   
ALB 2.3*  --   --  1.8*  --   --   --  2.1* 1.8*   < >  --   
WBC 8.2  --   --   --   --   --   --  13.3* 19.8*   < >  --   
HGB 6.4* 8.7* 7.0*  --    < > 7.1*   < > 9.3* 13.9   < >  --   
HCT 18.4* 25.5* 20.8*  --    < > 21.0*   < > 27.3* 41.7   < >  --   
*  --   --   --   --  119*  --  77* 93*   < >  --   
 < > = values in this interval not displayed. Recent Labs 10/24/19 
0400 10/23/19 
0039 INR 1.7* 1.3* PTP 17.2* 13.0* APTT 64.0*  --   
 
Needs: urine analysis, urine sodium, protein and creatinine No results found for: Ben Barrera Discussed with:  Colleague, Nursing 
 
: Sami Harrington MD 
10/24/2019 [facility]: 
[email or web address] [place] office: 
[address], [address] [place], [address] [place] [address] Phone: [phone] Fax :     [phone] [place] office: 
[address] [facility], [facility] Phone - [phone] Fax - [phone]

## 2019-10-24 NOTE — PROGRESS NOTES
@0700 Bedside and Verbal shift change report given to Griffin Tierney RN (oncoming nurse) by Elyssa Yao RN (offgoing nurse). Report included the following information SBAR, Kardex, ED Summary, Procedure Summary, Intake/Output, MAR, Accordion, Recent Results, Med Rec Status, Cardiac Rhythm Sinus and Alarm Parameters . @6262 Dr. Paula Ratliff arrives and exams the patient. Plan of care is to give Calcium Chloride 1 GM IVPB. Start on Insulin drip. /61 (75), Levophed wean to 193 mcg/min. Patient on assessment able to follow commands, gives thumbs up, and move feet. @0815 /97 (101), Levophed wean to 190 mcg/min. @0835 /62 (70), Levophed wean to 187 mcg/min. @0855 /51 (73), Levophed wean to 184 mcg/min. 
 
@0920 /94 (102), Leovphed wean to 180 mcg/min. 
 
@0930 /71 (81), Levophed wean to 177 mcg/min. 
 
@0945 Blood glucose 210 will start Insulin drip at 3 units/H per glucomander. Also updated Dr. Paula Ratliff of Calcium level on repeat, new orders for additional Calcium Chloride 1 GM IVPB. @1015 /93 (98), Levophed wean to 174 mcg/min. 
 
@1045 /73 (80), Levophed wean to 170 mcg/min. @1145 /65 (79), Levophed wean to 168 mcg/min. @1300 /67 (76), Levophed wean to 165 mcg/min. @1500  /78 (82), Levophed wean to 162 mcg/min. @1600 Patient unresponsive to painful stimuli. Precedex wean to 0.2 mcg/kg/H. Dr. Jackie Ford updated and instructed to monitor at this time. /64 (77), Levophed wean to 155 mcg/min. @1615 /84 (92), Levophed wean to 150 mcg/min. @0053 Precedex drip stopped. /73 (93), Levophed wean to 145 mcg/min. @1700 /60 (80), Levophed wean to 140 mcg/min. 
 
@1715 Patient now responsive and following commands. /68 (83), Levophed wean to 135 mcg/min. @1900 Bedside shift change report given to Emma Wang RN (oncoming nurse) by Griffin Tierney RN (offgoing nurse).  Report included the following information SBAR, Kardex, ED Summary, Procedure Summary, Intake/Output, MAR, Accordion, Recent Results, Med Rec Status, Cardiac Rhythm Sinus and Alarm Parameters .

## 2019-10-24 NOTE — PROGRESS NOTES
Pharmacy Dosing Note Current Regimen: Meropenem 500 mg IV every 8 hours New Regimen: Change to meropenem 500 mg IV every 12 hours for CrCl between 10 and 24 mL/min per [facility] Renal Dosing Protocol. Estimated Creatinine Clearance: 18.9 mL/min (A) (based on SCr of 2.36 mg/dL (H)).  
 
Thank you, 
Judge Quiroga, PHARMD

## 2019-10-24 NOTE — PROGRESS NOTES
[facility] Care, [facility] Sleep Medicine Name: Alvaro Groves MRN: [id number] : 1945 Hospital: [address] [facility] Date: 10/24/2019 IMPRESSION:  
· Acute on chronic respiratory failure · COPD/ Asthma exacerbation due to parainfluenza · Parainfluenza + · Retroperitoneal bleed · Hypotension PLAN:  
· Full vent support · Hellen/Levophed/vasopress- wean levophed first 
· Plts/FFP/blood products- trying to maintain a 1:3 ratio at this point to make sure patient able to coagulate · Calcium chloride ordered this morning · Fibrinogen still low- cryo again today · Calcium chloride again today · Appears to be a systemic issue of lack of coagulation at this point. Parainfluenza triggered DIC? · Keep  IV steroids at current dose · Insulin gtt · Pulmicort/Brovana · xopenex- decrease to q8 
· atrovent- decrease to q8 · Cardiology following for atrial tach- can't give metoprolol due to hypotension · Doxy finishing up · Mucinex · BG control · Internal patton · Updated daughter at bedside · Discussed with Dr. Glenys Calix 
  
 
Pt critically ill, on multiple pressors and at great risk for cardiac arrest. cctime 37 min outside procedures Subjective/Interval History:  
I have reviewed the flowsheet and previous days notes. Overnight events: 
Intubated On vasopressin/hellen/levophed on 196- good maps overnight Hgb trending down overnight. Given 2 units of pRBC Currently has gotten: 10 units pRBC, 2 FFP, 2 plts, 1 cryo Clear lungs on CXR Right pupil slightly larger, following commands still this morning. Objective:  
Vital Signs:   
Visit Vitals /72 Pulse (!) 152 Temp 99.7 °F (37.6 °C) Resp 16 Ht 5' 1\" (1.549 m) Wt 69.1 kg (152 lb 6.4 oz) SpO2 100% BMI 28.80 kg/m² O2 Device: Ventilator O2 Flow Rate (L/min): 3 l/min Temp (24hrs), Av.9 °F (37.2 °C), Min:98.2 °F (36.8 °C), Max:99.9 °F (37.7 °C) Intake/Output:  
Last shift:      No intake/output data recorded. Last 3 shifts: 10/22 1901 - 10/24 0700 In: 36766.3 [I.V.:5970.1] Out: 705 [BPEPD:999] Intake/Output Summary (Last 24 hours) at 10/24/2019 0756 Last data filed at 10/24/2019 0684 Gross per 24 hour Intake 8851.15 ml Output 183 ml Net 8668.15 ml Physical Exam: 
 
General: []Ill appearing [x] INtubated [] []No acute distress [] [] HEENT: []Icteric [x]PERRL []  
 [x]Anicteric Mucosa:[]moist   []dry [] Resp: []Wheeze [x]Clear to ascultation bilaterally []Accessory muscle use  
 []Rales []Chest tube:  []Air leak [] []Ronchi []Crepitus []  
[]  
  
CV: [x]Regular [x]Tachycardia []tachy  
 []Irregular []Bradycardic []  
 []Murmur []S3 [] []Edema []S4 []  
  
GI: []Soft  []NG Tube Bowel sounds: []present [x]absent []Firm []PEG []  
 [x]Distended  []  
  
: []Ewing []Hematuria [] []Clear urine [x]Edema [] MSK:  
 []SCDs [x]Edema [] []No deformities [] []  
  
Skin: [x]Warm []Dry  [] []Cool []Moist [] []Hot  []Diaphoretic [] []Rash  []Cyanosis [x] Bruising abdominal wall N-psych: [x]Sedated  [x]Oriented []Agitated []Alert  Follows commands:  
[x]Yes  []No [] Devices: [x]PICC []Trach []Chest tube: Air leak? []Y/[]N  
 []Central Line []PA Catheter [] DATA: 
Labs: 
Recent Labs 10/24/19 
0400 10/23/19 
2323 10/23/19 
1933  10/23/19 
1122  10/23/19 
0437 10/23/19 
4141 WBC 8.2  --   --   --   --   --  13.3* 19.8* HGB 6.4* 8.7* 7.0*   < > 7.1*   < > 9.3* 13.9 HCT 18.4* 25.5* 20.8*   < > 21.0*   < > 27.3* 41.7 *  --   --   --  119*  --  77* 93*  
 < > = values in this interval not displayed. Recent Labs 10/24/19 
0400 10/23/19 
1535 10/23/19 
0437 10/23/19 
0039  10/21/19 
2345 * 135* 129* 137   < >  --   
K 5.0 4.8 3.5 4.3   < > 3.5 CL 98 102 98 105   < >  --   
CO2 25 25 23 24   < >  --   
* 154* 380* 124*   < >  --   
 BUN 54* 50* 44* 50*   < >  --   
CREA 2.36* 2.01* 1.68* 1.78*   < >  --   
CA 6.2* 6.6* 5.8* 5.2*   < >  --   
MG 2.1  --  2.1  --   --  1.8 PHOS 7.0*  --  4.5  --   --  2.4* ALB 2.3* 1.8* 2.1* 1.8*   < >  --   
TBILI 0.6 0.4 0.7 0.5   < >  --   
SGOT >2,000* 413* 209* 181*   < >  --   
* 154* 64 71   < >  --   
INR 1.7*  --   --  1.3*  --   --   
 < > = values in this interval not displayed. Recent Labs 10/24/19 
3107 10/23/19 
1530 10/23/19 
0505 PH 7.21* 7.30* 7.28* PCO2 53* 43 45 PO2 91 156* 118* HCO3 21* 21* 21* FIO2 40 40 40 Imaging: 
[x]I have personally reviewed the patients radiographs 
[x]Radiographs reviewed with radiologist 
 []No change from prior, tubes and lines in adequate position []Improved   []Worsening Yoni Fitzpatrick MD 
[facility]

## 2019-10-24 NOTE — PROGRESS NOTES
[facility]: [phone] ([phone]) [facility]: [phone] ([phone]) Code Status: Full Code Advance Care Planning: 
Advance Care Planning 10/23/2019 Patient's Healthcare Decision Maker is: Legal Next of Kin Confirm Advance Directive None Patient Would Like to Complete Advance Directive Unable Primary Decision Maker: Jocelyn PARKERIS) - Daughter - [phone] Primary Decision Maker: Juana BENJAMIN CAROL) - Daughter - [phone] Primary Decision Maker: Jcarlos GARCIAEJANNET PARKERIS) - Son - [phone] Primary Decision Maker: Buell Gowers MCLEOD CAROL) - Daughter - [phone] Patient / Family Encounter Documentation Participants (names): Patient family including 2 adult children/LNOK Sandi Pineda, Isma Hayes ([place]), LILLIE Olvera; Palliative Medicine (Lucas Lopez NP, Kin Hickman RN) Narrative:  
 
Kt Smith NP and I met briefly with patient family in seating area outside ICU. They had sat peterson all night and still in good spirits - they are hopeful with small gains like gradual decrease of pressor support, patient giving thumbs up sign. Family stated another doctor told them it would take a miracle for patient to recover and they are holding fast to that very very very very very slim possibility. Psychosocial Issues Identified:  
 
Patient has extensive/loving family support. 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 Patient seems to be matriarch/nucleous/binder of family -- if she continue to decline, family will need a lot of support in facing difficult decisions that could involve letting go Goals of Care / Plan:  
 
Family aware of how critical patient is. They feel she needs more time to build strength and fight. Full restorative measures/full code. Will continue to follow for support. Thank you for the opportunity to be involved in the care of Ms. Parker Reynoso and her family. Bill Ratliff [facility], Supervisee in Social Work Palliative Medicine  [phone]

## 2019-10-24 NOTE — PROGRESS NOTES
Myke Red [facility] 
[address] [facility], [address] [place] [address] 
[phone] Medical Progress Note NAME: Mya Charles :  1945 MRM:  [id number] Date/Time: 10/24/2019 Subjective: Chief Complaint:  Patient was seen and examined by me. Chart reviewed. Remains intubated, sedated. Followed some commands Objective:  
 
 
Vitals:  
 
 
Last 24hrs VS reviewed since prior progress note. Most recent are: 
 
Visit Vitals BP (!) 121/96 Pulse (!) 152 Temp 99.6 °F (37.6 °C) Resp 21 Ht 5' 1\" (1.549 m) Wt 69.1 kg (152 lb 6.4 oz) SpO2 100% BMI 28.80 kg/m² SpO2 Readings from Last 6 Encounters:  
10/24/19 100% O2 Flow Rate (L/min): 3 l/min Intake/Output Summary (Last 24 hours) at 10/24/2019 5085 Last data filed at 10/24/2019 0900 Gross per 24 hour Intake 8474.25 ml Output 224 ml Net 8250.25 ml Exam:  
 
Physical Exam: 
 
Gen:  Disheveled, ill-appearing, intubated HEENT:  Pink conjunctivae, sluggish pupils Neck:  Supple, without masses Resp:  No accessory muscle use, CTAB anteriorly Card:  No murmurs, normal S1, S2 without thrills, anasarca Abd:  Soft, non-tender, non-distended, normoactive bowel sounds are present Musc:  Cool extremities Skin:  Diffused bruising, hematoma around groin Neuro:  Intubated, sedated Psych: Intubated, sedated Medications Reviewed: (see below) Lab Data Reviewed: (see below) 
 
______________________________________________________________________ Medications:  
 
Current Facility-Administered Medications Medication Dose Route Frequency  0.9% sodium chloride infusion 250 mL  250 mL IntraVENous PRN  
 calcium chloride 1 g in 0.9% sodium chloride 250 mL IVPB  1 g IntraVENous ONCE  
 0.9% sodium chloride infusion 250 mL  250 mL IntraVENous PRN  
 insulin regular (NOVOLIN R, HUMULIN R) 100 Units in 0.9% sodium chloride 100 mL infusion  0-50 Units/hr IntraVENous TITRATE  insulin lispro (HUMALOG) injection   SubCUTAneous TIDAC  glucose chewable tablet 16 g  4 Tab Oral PRN  
 glucagon (GLUCAGEN) injection 1 mg  1 mg IntraMUSCular PRN  
 meropenem (MERREM) 500 mg in 0.9% sodium chloride (MBP/ADV) 50 mL  0.5 g IntraVENous Q12H  
 0.9% sodium chloride infusion 250 mL  250 mL IntraVENous PRN  
 fentaNYL (PF) 1,500 mcg/30 mL (50 mcg/mL) infusion  0-200 mcg/hr IntraVENous CONTINUOUS  
 fentaNYL citrate (PF) injection 25 mcg  25 mcg IntraVENous Q2H PRN  
 dexmedeTOMidine in 0.9 % NaCl (PRECEDEX) 400 mcg/100 mL (4 mcg/mL) infusion soln  0.2-0.7 mcg/kg/hr IntraVENous TITRATE  
 0.9% sodium chloride infusion 250 mL  250 mL IntraVENous PRN  
 famotidine (PF) (PEPCID) 20 mg in sodium chloride 0.9% 10 mL injection  20 mg IntraVENous Q24H  
 0.9% sodium chloride infusion 250 mL  250 mL IntraVENous PRN  
 insulin lispro (HUMALOG) injection   SubCUTAneous Q6H  
 0.9% sodium chloride infusion 250 mL  250 mL IntraVENous PRN  
 albumin human 25% (BUMINATE) solution 25 g  25 g IntraVENous Q6H  
 0.9% sodium chloride infusion 250 mL  250 mL IntraVENous PRN  
 0.9% sodium chloride infusion 250 mL  250 mL IntraVENous PRN  
 budesonide (PULMICORT) 0.5 mg/2 mL nebulizer suspension  theophylline ER (DAVID-24) capsule 200 mg  200 mg Oral Q24H  
 HYDROcodone-acetaminophen (NORCO) 7.5-325 mg per tablet 1 Tab  1 Tab Oral QID PRN  
 HYDROmorphone (DILAUDID) syringe 0.5 mg  0.5 mg IntraVENous Q4H PRN  
 PHENYLephrine (HUNTER-SYNEPHRINE) 100 mg in 0.9% sodium chloride 250 mL infusion   mcg/min IntraVENous TITRATE  
 NOREPINephrine (LEVOPHED) 32 mg in dextrose 5% 250 mL infusion  2-200 mcg/min IntraVENous TITRATE  vasopressin (VASOSTRICT) 20 Units in 0.9% sodium chloride 100 mL infusion  0.04 Units/min IntraVENous CONTINUOUS  
 sodium bicarbonate (8.4%) 150 mEq in dextrose 5% 1,000 mL infusion   IntraVENous CONTINUOUS  
 0.9% sodium chloride infusion 250 mL  250 mL IntraVENous PRN  
 alteplase (CATHFLO) 1 mg in sterile water (preservative free) 1 mL injection  1 mg InterCATHeter PRN  
 levalbuterol (XOPENEX) nebulizer soln 1.25 mg/3 mL  1.25 mg Nebulization Q4H RT  
 ipratropium (ATROVENT) 0.02 % nebulizer solution 0.5 mg  0.5 mg Nebulization Q4H RT  
 ALPRAZolam (XANAX) tablet 0.25 mg  0.25 mg Oral Q8H PRN  
 influenza vaccine 2019-20 (6 mos+)(PF) (FLUARIX/FLULAVAL/FLUZONE QUAD) injection 0.5 mL  0.5 mL IntraMUSCular PRIOR TO DISCHARGE  guaiFENesin ER (MUCINEX) tablet 1,200 mg  1,200 mg Oral Q12H  sorbitol 70 % solution 30 mL  30 mL Oral DAILY PRN  
 sodium chloride (NS) flush 5-40 mL  5-40 mL IntraVENous Q8H  
 sodium chloride (NS) flush 5-40 mL  5-40 mL IntraVENous PRN  
 acetaminophen (TYLENOL) tablet 650 mg  650 mg Oral Q4H PRN  
 ondansetron (ZOFRAN) injection 4 mg  4 mg IntraVENous Q4H PRN  
 albuterol (PROVENTIL VENTOLIN) nebulizer solution 2.5 mg  2.5 mg Nebulization Q2H PRN  
 glucose chewable tablet 16 g  4 Tab Oral PRN  
 glucagon (GLUCAGEN) injection 1 mg  1 mg IntraMUSCular PRN  
 dextrose 10% infusion 0-250 mL  0-250 mL IntraVENous PRN  
 methylPREDNISolone (PF) (SOLU-MEDROL) injection 60 mg  60 mg IntraVENous Q6H  
 arformoterol (BROVANA) neb solution 15 mcg  15 mcg Nebulization BID RT  
 budesonide (PULMICORT) 500 mcg/2 ml nebulizer suspension  500 mcg Nebulization BID RT  
 nicotine (NICODERM CQ) 14 mg/24 hr patch 1 Patch  1 Patch TransDERmal DAILY  hydrOXYzine HCl (ATARAX) tablet 25 mg  25 mg Oral QID PRN  
  rosuvastatin (CRESTOR) tablet 20 mg  20 mg Oral DAILY  zolpidem (AMBIEN) tablet 5 mg  5 mg Oral QHS PRN Lab Review:  
 
Recent Labs 10/24/19 
0400 10/23/19 
2323 10/23/19 
1933  10/23/19 
1122  10/23/19 
0437 10/23/19 
5083 WBC 8.2  --   --   --   --   --  13.3* 19.8* HGB 6.4* 8.7* 7.0*   < > 7.1*   < > 9.3* 13.9 HCT 18.4* 25.5* 20.8*   < > 21.0*   < > 27.3* 41.7 *  --   --   --  119*  --  77* 93*  
 < > = values in this interval not displayed. Recent Labs 10/24/19 
0400 10/23/19 
1535 10/23/19 
0437 10/23/19 
0039  10/21/19 
2345 * 135* 129* 137   < >  --   
K 5.0 4.8 3.5 4.3   < > 3.5 CL 98 102 98 105   < >  --   
CO2 25 25 23 24   < >  --   
* 154* 380* 124*   < >  --   
BUN 54* 50* 44* 50*   < >  --   
CREA 2.36* 2.01* 1.68* 1.78*   < >  --   
CA 6.2* 6.6* 5.8* 5.2*   < >  --   
MG 2.1  --  2.1  --   --  1.8 PHOS 7.0*  --  4.5  --   --  2.4* ALB 2.3* 1.8* 2.1* 1.8*   < >  --   
TBILI 0.6 0.4 0.7 0.5   < >  --   
SGOT >2,000* 413* 209* 181*   < >  --   
* 154* 64 71   < >  --   
INR 1.7*  --   --  1.3*  --   --   
 < > = values in this interval not displayed. Lab Results Component Value Date/Time Glucose (POC) 211 (H) 10/24/2019 06:00 AM  
 Glucose (POC) 155 (H) 10/23/2019 11:38 PM  
 Glucose (POC) 215 (H) 10/23/2019 05:43 PM  
 Glucose (POC) 166 (H) 10/23/2019 04:26 PM  
 Glucose (POC) 100 10/23/2019 01:18 PM  
 
 
  
Assessment / Plan:  
 
Principal Problem: 
 
68 yo hx of HTN, DM, COPD on 2L O2, presented w/ resp failure, hypoxia, COPD, parainfluenza. Complicated by severe shock, anemia, retroperitoneal bleed, DIC, ARF 
 
1) Hypovolemic shock: due to retroperitoneal bleed. Minimal improvement. Currently on Levophed, Srini gtt, and Vasopressin. Cont IVF, blood products. Pulm following 2) Retroperitoneal bleeding: likely from DIC (as a complication of parainfluenza?). S/p IR embolization of lumbar arteries on 10/22.   Cont supportive care with blood products 3) Acute blood loss anemia/thrombocytopenia/DIC: due to parainfluenza? Geofm Dowse S/p multiple units of RBCs, plts, FFP, cryo, DDAVP. Will monitor coags, fibrinogen, LDH, haptoglobin. Cont blood/plasma/cryo transfusion as needed 4) Acute resp failure/hypoxia: due to COPD, parainfluenza. Intubated on 10/22. Chest CT neg for PE. Management per [facility] 5) Oliguric ARF: due to shock. Dialysis on hold. Renal following 6) Shock liver: due to above issues. Cont supportive care 7) COPD exacerbation/parainfluenza: cont IV steroids, nebs, LABA/ICS, Isauro, Doxy. Was on 2L O2 at home 8) DM type 2: A1C 8.4%. Cont NPH, SSI. Consider insulin gtt if BS worse Total time spent with patient: 40 Minutes (critical care) Care Plan discussed with: Patient, nursing, pulm, renal 
 
Discussed:  Care Plan Prophylaxis:  SCD's Disposition:  SNF/LTC 
        
___________________________________________________ Attending Physician: Neel Saldana MD

## 2019-10-24 NOTE — PROGRESS NOTES
INTENSIVIST DAILY PROGRESS NOTE Name: Sherman Lynne : 1945 MRN: [id number] Date: 10/24/2019 9:54 AM  
 
I have reviewed the flowsheet and previous days notes. - Pt intubated and sedated but responsive to verbal stim Vital Signs:   
Visit Vitals BP (!) 129/94 Pulse (!) 152 Temp 99.6 °F (37.6 °C) Resp 11 Ht 5' 1\" (1.549 m) Wt 152 lb 6.4 oz (69.1 kg) SpO2 100% BMI 28.80 kg/m² O2 Device: Ventilator O2 Flow Rate (L/min): 3 l/min Temp (24hrs), Av °F (37.2 °C), Min:98.2 °F (36.8 °C), Max:99.9 °F (37.7 °C) Intake/Output:  
Last shift:      10/24 07 - 10/24 1900 In: 375 Out: 48 [Urine:48] Last 3 shifts: 10/22 1901 - 10/24 07 In: 02993.3 [I.V.:5970.1] Out: 458 [WLVLY:653] Intake/Output Summary (Last 24 hours) at 10/24/2019 6911 Last data filed at 10/24/2019 1493 Gross per 24 hour Intake 8849.25 ml Output 224 ml Net 8625.25 ml Hemodynamics:  
PAP:    
Wedge:    
CVP:     
CO:    
CI:    
SVR:    
PVR:     
 
Ventilator Settings: 
Ventilator Mode: PRVC Respiratory Rate Back-Up Rate: 20 Insp Time (sec): 0.9 sec I:E Ratio: 1:2.4 Ventilator Volumes Vt Set (ml): 400 ml Vt Exhaled (Machine Breath) (ml): 504 ml Vt Spont (ml): 589 ml Ve Observed (l/min): 9.6 l/min Ventilator Pressures PIP Observed (cm H2O): 29 cm H2O Plateau Pressure (cm H2O): 18 cm H2O 
MAP (cm H2O): 12 PEEP/VENT (cm H2O): 5 cm H20 Auto PEEP Observed (cm H2O): 7.6 cm H2O Physical Exam: 
General:  Intubated and sedated Head:  Normocephalic, without obvious abnormality, atraumatic. Eyes:  Conjunctivae/corneas clear. PERRL. Throat: Lips, mucosa, and tongue normal. Teeth and gums normal. ET tube in place. Neck: Supple, symmetrical, trachea midline, no adenopathy, no carotid bruit, and no JVD Lungs:   Clear to auscultation bilaterally. Heart:  Tachy with reg rhythm, S1, S2 normal, no murmur, click, rub or gallop. Abdomen:   Diffuse ecchymosis Extremities: 2+ Edema Pulses: 2+ and symmetric all extremities. Neurologic: Grossly nonfocal  
 
DATA:  
Current Facility-Administered Medications Medication Dose Route Frequency  0.9% sodium chloride infusion 250 mL  250 mL IntraVENous PRN  
 calcium chloride 1 g in 0.9% sodium chloride 250 mL IVPB  1 g IntraVENous ONCE  
 0.9% sodium chloride infusion 250 mL  250 mL IntraVENous PRN  
 insulin regular (NOVOLIN R, HUMULIN R) 100 Units in 0.9% sodium chloride 100 mL infusion  0-50 Units/hr IntraVENous TITRATE  insulin lispro (HUMALOG) injection   SubCUTAneous TIDAC  glucose chewable tablet 16 g  4 Tab Oral PRN  
 glucagon (GLUCAGEN) injection 1 mg  1 mg IntraMUSCular PRN  
 meropenem (MERREM) 500 mg in 0.9% sodium chloride (MBP/ADV) 50 mL  0.5 g IntraVENous Q12H  
 0.9% sodium chloride infusion 250 mL  250 mL IntraVENous PRN  
 0.9% sodium chloride infusion 250 mL  250 mL IntraVENous PRN  
 fentaNYL (PF) 1,500 mcg/30 mL (50 mcg/mL) infusion  0-200 mcg/hr IntraVENous CONTINUOUS  
 fentaNYL citrate (PF) injection 25 mcg  25 mcg IntraVENous Q2H PRN  
 dexmedeTOMidine in 0.9 % NaCl (PRECEDEX) 400 mcg/100 mL (4 mcg/mL) infusion soln  0.2-0.7 mcg/kg/hr IntraVENous TITRATE  
 0.9% sodium chloride infusion 250 mL  250 mL IntraVENous PRN  
 famotidine (PF) (PEPCID) 20 mg in sodium chloride 0.9% 10 mL injection  20 mg IntraVENous Q24H  
 0.9% sodium chloride infusion 250 mL  250 mL IntraVENous PRN  
 insulin lispro (HUMALOG) injection   SubCUTAneous Q6H  
 0.9% sodium chloride infusion 250 mL  250 mL IntraVENous PRN  
 albumin human 25% (BUMINATE) solution 25 g  25 g IntraVENous Q6H  
  0.9% sodium chloride infusion 250 mL  250 mL IntraVENous PRN  
 0.9% sodium chloride infusion 250 mL  250 mL IntraVENous PRN  
 budesonide (PULMICORT) 0.5 mg/2 mL nebulizer suspension  theophylline ER (DAVID-24) capsule 200 mg  200 mg Oral Q24H  
 HYDROcodone-acetaminophen (NORCO) 7.5-325 mg per tablet 1 Tab  1 Tab Oral QID PRN  
 HYDROmorphone (DILAUDID) syringe 0.5 mg  0.5 mg IntraVENous Q4H PRN  
 PHENYLephrine (HUNTER-SYNEPHRINE) 100 mg in 0.9% sodium chloride 250 mL infusion   mcg/min IntraVENous TITRATE  
 NOREPINephrine (LEVOPHED) 32 mg in dextrose 5% 250 mL infusion  2-200 mcg/min IntraVENous TITRATE  vasopressin (VASOSTRICT) 20 Units in 0.9% sodium chloride 100 mL infusion  0.04 Units/min IntraVENous CONTINUOUS  
 sodium bicarbonate (8.4%) 150 mEq in dextrose 5% 1,000 mL infusion   IntraVENous CONTINUOUS  
 0.9% sodium chloride infusion 250 mL  250 mL IntraVENous PRN  
 alteplase (CATHFLO) 1 mg in sterile water (preservative free) 1 mL injection  1 mg InterCATHeter PRN  
 levalbuterol (XOPENEX) nebulizer soln 1.25 mg/3 mL  1.25 mg Nebulization Q4H RT  
 ipratropium (ATROVENT) 0.02 % nebulizer solution 0.5 mg  0.5 mg Nebulization Q4H RT  
 ALPRAZolam (XANAX) tablet 0.25 mg  0.25 mg Oral Q8H PRN  
 influenza vaccine 2019-20 (6 mos+)(PF) (FLUARIX/FLULAVAL/FLUZONE QUAD) injection 0.5 mL  0.5 mL IntraMUSCular PRIOR TO DISCHARGE  guaiFENesin ER (MUCINEX) tablet 1,200 mg  1,200 mg Oral Q12H  sorbitol 70 % solution 30 mL  30 mL Oral DAILY PRN  
 sodium chloride (NS) flush 5-40 mL  5-40 mL IntraVENous Q8H  
 sodium chloride (NS) flush 5-40 mL  5-40 mL IntraVENous PRN  
 acetaminophen (TYLENOL) tablet 650 mg  650 mg Oral Q4H PRN  
 ondansetron (ZOFRAN) injection 4 mg  4 mg IntraVENous Q4H PRN  
  albuterol (PROVENTIL VENTOLIN) nebulizer solution 2.5 mg  2.5 mg Nebulization Q2H PRN  
 glucose chewable tablet 16 g  4 Tab Oral PRN  
 glucagon (GLUCAGEN) injection 1 mg  1 mg IntraMUSCular PRN  
 dextrose 10% infusion 0-250 mL  0-250 mL IntraVENous PRN  
 methylPREDNISolone (PF) (SOLU-MEDROL) injection 60 mg  60 mg IntraVENous Q6H  
 arformoterol (BROVANA) neb solution 15 mcg  15 mcg Nebulization BID RT  
 budesonide (PULMICORT) 500 mcg/2 ml nebulizer suspension  500 mcg Nebulization BID RT  
 nicotine (NICODERM CQ) 14 mg/24 hr patch 1 Patch  1 Patch TransDERmal DAILY  hydrOXYzine HCl (ATARAX) tablet 25 mg  25 mg Oral QID PRN  
 rosuvastatin (CRESTOR) tablet 20 mg  20 mg Oral DAILY  zolpidem (AMBIEN) tablet 5 mg  5 mg Oral QHS PRN Labs: 
Recent Labs 10/24/19 
8622 10/24/19 
0400 10/23/19 
2323  10/23/19 
1122  10/23/19 
1597 10/23/19 
0661 WBC  --  8.2  --   --   --   --  13.3* 19.8* HGB 6.4* 6.4* 8.7*   < > 7.1*   < > 9.3* 13.9 HCT 19.9* 18.4* 25.5*   < > 21.0*   < > 27.3* 41.7 PLT  --  109*  --   --  119*  --  77* 93*  
 < > = values in this interval not displayed. Recent Labs 10/24/19 
0400 10/23/19 
1535 10/23/19 
0437 10/23/19 
0039  10/21/19 
2345 * 135* 129* 137   < >  --   
K 5.0 4.8 3.5 4.3   < > 3.5 CL 98 102 98 105   < >  --   
CO2 25 25 23 24   < >  --   
* 154* 380* 124*   < >  --   
BUN 54* 50* 44* 50*   < >  --   
CREA 2.36* 2.01* 1.68* 1.78*   < >  --   
CA 6.2* 6.6* 5.8* 5.2*   < >  --   
MG 2.1  --  2.1  --   --  1.8 PHOS 7.0*  --  4.5  --   --  2.4* ALB 2.3* 1.8* 2.1* 1.8*   < >  --   
SGOT >2,000* 413* 209* 181*   < >  --   
* 154* 64 71   < >  --   
INR 1.7*  --   --  1.3*  --   --   
 < > = values in this interval not displayed. Recent Labs 10/24/19 
9585 10/23/19 
1530 10/23/19 
0505 PH 7.21* 7.30* 7.28* PCO2 53* 43 45 PO2 91 156* 118* HCO3 21* 21* 21* FIO2 40 40 40 Imaging:  I have personally reviewed the patients radiographs and reports. IMPRESSION:  
· Hypovolemic shock · Acute on chronic resp failure · Retroperitonel bleed · DIC?  
· COPD  
· + Parainfluenza · Shock liver PLAN:  
· Full vent support continue to monitor ABGs · Srini/Levophed/vasopress- wean levophed first 
· Plts/FFP/blood products- trying to maintain a 1:3 ratio · Fibrinogen still low- cryo again today · Replete w/  Calcium chloride again today · Keep  IV steroids at current dose · Insulin gtt · Pulmicort/Brovana · xopenex- decrease to q8 
· atrovent- decrease to q8 
· atrial tach- can't give metoprolol due to hypotension · Doxy for COPD exacerbation · Mucinex · BG control · Internal patton My assessment/plan was discussed with Dr. Jaret Huizar, ICU Attending Physician.  
 
Madison Paulson MD 
Family Medicine Resident, PGY-2

## 2019-10-25 NOTE — PROGRESS NOTES
Palliative Medicine Consult [place]: [phone] ([id number]) Patient Name: Migdalia Skinner YOB: 1945 Date of Initial Consult: 10/23/2019 Reason for Consult: Care decisions Requesting Provider: Juana Baugh Primary Care Physician: Shonda Ceballos MD 
 
 SUMMARY:  
Migdalia Skinner is a 69 y.o. with a past history of COPD, asthma, DM, HTN, lupus, and tobacco abuse, who was admitted on 10/18/2019 from home with a diagnosis of COPD exacerbation. Patient presented to the ED with complaints of shortness of breath. ED eval revealed increased work of breathing on exam tachycardia, and chest CT with no acute process. Stable right upper lobe pulmonary nodules. Patient admitted for IV steroids, antibiotics, and supplemental oxygen/bipap. RVP positive for parainfluenza. Patient continued to be tachycardic and plan for CTA to rule out PE delayed due to poor IV access. Patient was empirically started on therapeutic Lovenox while awaiting CTA. CTA performed 10/21 and showed No acute process or evidence of pulmonary embolism. Yesterday (10/22), patient developed acute onset of back and abdominal pain with associated hy[otension. Vasopressor support was initiated and cbc with drop in hemoglobin (11.0-->7. 8). CT abdomen revealed large right-sided retroperitoneal hematoma with evidence of active contrast extravasation. IR consulted and arteriogram showed vascular irregularity in the right retroperitoneum fed by branches of the right L3 and L4 lumbar arteries. These arteries were embolized with microcoils. Continued hemodynamic monitoring, fluid and packed RBC treatment and serial H&H is recommended. Overnight, patient condition continued to deteriorate, was intubated, oliguric, and currently on max dose of vasopressor support. Has received a total of 7 PRBCs, 3 FFP, and 1 dose of platelets. 2 of cryo ordered by transfused yet. Plan for central line and HD cath placement today. Current medical issues leading to Palliative Medicine involvement include: care decisions. SH: , has three daughters and son Gabino Kraft, Viv Ordonez, and Emilia Medellin). One son is . Patient resides with her elderly mother and her daughter with disability. Interim history: 
10/24--> Continues on triple pressor support, intubated and sedated. Lactate continues to rise. Labs am labs revealed transaminitis consistent with shock liver. Started on insulin drip for hyperglycemia. Oliguric but per renal no RRT yet. Per bedside nurse, is alert at times and follows commands. Received three additional units of PRBCs, and I dose of platelets overnight (total transfusions: 10 units PRBC, 3 FFP, 2 plts, 2 cryo). Attempting to wean levophed first.  
 
10/25--> Continues to be maxed out on three pressors and still hypotensive. Lactic acid remains elevated. Renal consulted and plan to initiate CVVH. Unable to obtain arterial line this am  
 PALLIATIVE DIAGNOSES:  
1. Physical debility 2. Goals of care 3. ACP 4. Severe shock 5. Respiratory failure s/p intubation 10/22 6. Retroperitoneal hematoma- s/p embolization 10/22 7. Acute COPD exacerbation- likely triggered by parainfluenza virus PLAN:  
1. Met with patient and then with large family in waiting room. 2. Patient intubated, unresponsive. Not currently on sedation. Hypotensive on max pressor support. Lactic acid up to 8.  
3. Goals of care--> Discussed events from last 24 hours. Family is struggling with fact that despite all our efforts, she is not going to survive. Daughter, Leisa Monique, struggled the most with thought of discontinuing aggressive measures despite futility. Family has had extensive discussion and at this point would like to focus on patient's comfort.  Explained what this process would look like- stopping all interventions not directly contributing to patient's comfort (extubation, stopping antibiotics and transfusion, and stopping vasopressors). Family agrees with comfort care but do not want the vasopressors stopped at this time, (despite the fact that she remains hypotensive with max support). Comfort care orderset placed, will follow closely with ongoing discussions related to discontinuing ineffective vasopressor support. 4. Code status discussion--> addressed code status with family (with all LNOK included) and they understand that patient is not going to survive, do not want heroic measures in the event she suffers cardiac arrest. Concerned with compassionate extubation, as patient had always expressed fear with the thought of struggling to breath and do not want to see her suffer with any shortness of breath at the end of life, despite my explaining that we would aggressively manage these symptoms with medication. Will continue current vent settings as is and revisit based on family's wishes. 5. ACP--> No AMD on file. Legal next of kin is surrogate decision (three daughters and son). 6. Initial consult note routed to primary continuity provider and/or primary health care team members 7. Communicated plan of care with: Palliative [facility] Team 
 
 GOALS OF CARE / TREATMENT PREFERENCES:  
 
GOALS OF CARE: 
Patient/Health Care Proxy Stated Goals: (focus on comfort and symptom management; do not wish to pursue compassionate extubation currently and wish to continue vasopressors for now despite futility) TREATMENT PREFERENCES:  
Code Status: DNR Advance Care Planning: 
[x] [facility] Interdisciplinary Team has updated the ACP Navigator with [place] and Patient Capacity Primary Decision Maker: Ronne Lombard MCLEOD LORIS) - Daughter - [phone] Primary Decision Maker: Beau MEDINA) - Daughter - [phone] Primary Decision Maker: Higinio MEDINA) - Son - [phone] Primary Decision Maker: Armida MEDINA) - Daughter - [phone] Advance Care Planning 10/23/2019 Patient's Healthcare Decision Maker is: Legal Next of Kin Confirm Advance Directive None Patient Would Like to Complete Advance Directive Unable Medical Interventions: Comfort measures Other Instructions: Other: As far as possible, the palliative care team has discussed with patient / health care proxy about goals of care / treatment preferences for patient. HISTORY:  
 
History obtained from: family, chart CHIEF COMPLAINT: shortness of breath HPI/SUBJECTIVE: The patient is:  
[] Verbal and participatory [x] Non-participatory due to: intubated and sedated. Patient intubated and sedated. Remains profoundly hypotensive despite multiple blood transfusions and max pressor support. 10/24--> Patient intubated and sedated on fentanyl. Slowly weaning levophed (down from 200 mcg/min to 165 mcg/min currently). Remains on max dose of neosynephrine and on vasopressin. 10/25--> Continues to be maxed out on three pressors and still hypotensive. Lactic acid remains elevated. Renal consulted and plan to initiate CVVH. Unable to obtain arterial line this am  
 
Clinical Pain Assessment (nonverbal scale for severity on nonverbal patients):  
Clinical Pain Assessment Severity: 0 Activity (Movement): Lying quietly, normal position Duration: for how long has pt been experiencing pain (e.g., 2 days, 1 month, years) Frequency: how often pain is an issue (e.g., several times per day, once every few days, constant) FUNCTIONAL ASSESSMENT:  
 
Palliative Performance Scale (PPS): PPS: 10 PSYCHOSOCIAL/SPIRITUAL SCREENING:  
 
Palliative IDT has assessed this patient for cultural preferences / practices and a referral made as appropriate to needs (Cultural Services, Patient Advocacy, Ethics, etc.) Any spiritual / Anabaptism concerns: 
[] Yes /  [x] No 
 
Caregiver Burnout: 
[] Yes /  [x] No /  [] No Caregiver Present Anticipatory grief assessment:  
[x] Normal  / [] Maladaptive ESAS Anxiety: ESAS Depression:    
 
 
 REVIEW OF SYSTEMS:  
 
Positive and pertinent negative findings in ROS are noted above in HPI. The following systems were [x] reviewed / [] unable to be reviewed as noted in HPI Other findings are noted below. Systems: constitutional, ears/nose/mouth/throat, respiratory, gastrointestinal, genitourinary, musculoskeletal, integumentary, neurologic, psychiatric, endocrine. Positive findings noted below. Modified ESAS Completed by: provider Drowsiness: 10 Pain: 0 Dyspnea: 0 Stool Occurrence(s): 1 PHYSICAL EXAM:  
 
From RN flowsheet: 
Wt Readings from Last 3 Encounters:  
10/21/19 152 lb 6.4 oz (69.1 kg) Blood pressure (!) 88/56, pulse (!) 136, temperature 98.7 °F (37.1 °C), resp. rate 19, height 5' 1\" (1.549 m), weight 152 lb 6.4 oz (69.1 kg), SpO2 (!) 86 %. Pain Scale 1: Adult Nonverbal Pain Scale Pain Intensity 1: 0 Pain Location 1: Abdomen Pain Orientation 1: Anterior, Posterior Pain Description 1: Constant Pain Intervention(s) 1: Repositioned Last bowel movement, if known:  
 
Constitutional: intubated, unresponsive Eyes: pupils equal, sluggish, anicteric ENMT: no nasal discharge, dry mucous membranes Cardiovascular: regular rhythm, distal pulses not palpable Respiratory: breathing not labored, symmetric Gastrointestinal: soft non-tender, +bowel sounds Musculoskeletal: no deformity, no tenderness to palpation, extremities cool to touch and mottles Skin: warm, dry Neurologic: unresponsive Psychiatric: unable to assess Other: 
 
 
 HISTORY:  
 
Principal Problem: COPD with acute exacerbation ([facility] [address] (10/18/2019) Active Problems: 
  DM type 2 (diabetes mellitus, type 2) ([facility] [address] (10/18/2019) Essential hypertension (10/18/2019) Acute on chronic respiratory failure with hypoxia ([facility] [address] (10/18/2019) Dyslipidemia (10/19/2019) [facility] (10/19/2019) Parainfluenza infection (10/21/2019) Physical debility () Goals of care, counseling/discussion () 
 
  ACP (advance care planning) () Past Medical History:  
Diagnosis Date  Asthma  Chronic obstructive pulmonary disease ([facility] [address]  Diabetes ([facility]  History of vascular access device 10/21/2019 [facility] VAT 5 FR dual PICC for access L basilic  Hypertension  Lupus ([facility] Past Surgical History:  
Procedure Laterality Date  HX HERNIA REPAIR    
 IR INSERT NON TUNL CVC OVER 5 YRS  10/23/2019  IR INSERT NON TUNL CVC OVER 5 YRS  10/23/2019  IR OCCL TXCATH HEMMORAGE W SI  10/22/2019 Family History Problem Relation Age of Onset  Coronary Artery Disease Son  Stroke Son History reviewed, no pertinent family history. Social History Tobacco Use  Smoking status: Current Every Day Smoker Packs/day: 2.00 Years: 57.00 Pack years: 114.00  Smokeless tobacco: Never Used Substance Use Topics  Alcohol use: Not Currently Allergies Allergen Reactions  Chocolate [Cocoa] Unknown (comments)  Demerol [Meperidine] Hives  Other Plant, Animal, Environmental Other (comments) Numerous environmental allergies  Tape [Adhesive] Contact Dermatitis Current Facility-Administered Medications Medication Dose Route Frequency  0.9% sodium chloride infusion 250 mL  250 mL IntraVENous PRN  
 fentaNYL citrate (PF) injection 25 mcg  25 mcg IntraVENous Q15MIN PRN  
 ondansetron (ZOFRAN) injection 4 mg  4 mg IntraVENous Q4H PRN  
 LORazepam (ATIVAN) injection 1 mg  1 mg IntraVENous Q15MIN PRN  
 acetaminophen (TYLENOL) tablet 650 mg  650 mg Oral Q4H PRN Or  
 acetaminophen (TYLENOL) solution 650 mg  650 mg Oral Q4H PRN  Or  
 acetaminophen (TYLENOL) suppository 650 mg  650 mg Rectal Q4H PRN  
 ketorolac (TORADOL) injection 30 mg  30 mg IntraVENous Q8H PRN  
  scopolamine (TRANSDERM-SCOP) 1 mg over 3 days 1 Patch  1 Patch TransDERmal Q72H  albuterol (PROVENTIL VENTOLIN) nebulizer solution 2.5 mg  2.5 mg Nebulization Q2H PRN  
 fentaNYL (PF) 1,500 mcg/30 mL (50 mcg/mL) infusion  0-200 mcg/hr IntraVENous CONTINUOUS  
 PHENYLephrine (HUNTER-SYNEPHRINE) 100 mg in 0.9% sodium chloride 250 mL infusion   mcg/min IntraVENous TITRATE  
 NOREPINephrine (LEVOPHED) 32 mg in dextrose 5% 250 mL infusion  2-200 mcg/min IntraVENous TITRATE  vasopressin (VASOSTRICT) 20 Units in 0.9% sodium chloride 100 mL infusion  0.04 Units/min IntraVENous CONTINUOUS  
 sodium chloride (NS) flush 5-40 mL  5-40 mL IntraVENous Q8H  
 nicotine (NICODERM CQ) 14 mg/24 hr patch 1 Patch  1 Patch TransDERmal DAILY  
 
 
 
 LAB AND IMAGING FINDINGS:  
 
Lab Results Component Value Date/Time WBC 11.3 (H) 10/25/2019 04:44 AM  
 HGB 7.0 (L) 10/25/2019 11:41 AM  
 PLATELET 70 (L) 27/94/1139 04:44 AM  
 
Lab Results Component Value Date/Time Sodium 130 (L) 10/25/2019 07:26 AM  
 Potassium 5.8 (H) 10/25/2019 07:26 AM  
 Chloride 94 (L) 10/25/2019 07:26 AM  
 CO2 28 10/25/2019 07:26 AM  
 BUN 59 (H) 10/25/2019 07:26 AM  
 Creatinine 2.94 (H) 10/25/2019 07:26 AM  
 Calcium 6.1 (LL) 10/25/2019 07:26 AM  
 Magnesium 2.1 10/25/2019 07:26 AM  
 Phosphorus 7.4 (H) 10/24/2019 08:52 AM  
  
Lab Results Component Value Date/Time AST (SGOT) >2,000 (H) 10/25/2019 04:44 AM  
 Alk. phosphatase 190 (H) 10/25/2019 04:44 AM  
 Protein, total 3.5 (L) 10/25/2019 04:44 AM  
 Albumin 2.6 (L) 10/25/2019 04:44 AM  
 Globulin 0.9 (L) 10/25/2019 04:44 AM  
 
Lab Results Component Value Date/Time INR 1.7 (H) 10/24/2019 04:00 AM  
 Prothrombin time 17.2 (H) 10/24/2019 04:00 AM  
 aPTT 64.0 (H) 10/24/2019 04:00 AM  
  
No results found for: IRON, FE, TIBC, IBCT, PSAT, FERR Lab Results Component Value Date/Time  pH 7.28 (L) 10/25/2019 06:40 AM  
 PCO2 52 (H) 10/25/2019 06:40 AM  
 PO2 81 10/25/2019 06:40 AM  
 
No components found for: [place] Lab Results Component Value Date/Time  (H) 10/23/2019 12:39 AM  
 CK - MB 10.5 (H) 10/23/2019 12:39 AM  
  
 
 
   
 
Total time: 65 
Counseling / coordination time, spent as noted above: 50 
> 50% counseling / coordination?: yes Prolonged service was provided for  []30 min   []75 min in face to face time in the presence of the patient, spent as noted above. Time Start:  
Time End:  
Note: this can only be billed with 75985 (initial) or 97078 (follow up). If multiple start / stop times, list each separately.

## 2019-10-25 NOTE — PROGRESS NOTES
INTENSIVIST DAILY PROGRESS NOTE Name: Andria Brooks : 1945 MRN: [id number] Date: 10/25/2019 9:54 AM  
 
I have reviewed the flowsheet and previous days notes. - Pt intubated and sedated but responsive to verbal stim - worsening edema Vital Signs:   
Visit Vitals /79 Pulse (!) 142 Temp 98.7 °F (37.1 °C) Resp 20 Ht 5' 1\" (1.549 m) Wt 152 lb 6.4 oz (69.1 kg) SpO2 93% BMI 28.80 kg/m² O2 Device: Ventilator, Endotracheal tube O2 Flow Rate (L/min): 3 l/min Temp (24hrs), Av.8 °F (37.7 °C), Min:98.7 °F (37.1 °C), Max:100.5 °F (38.1 °C) Intake/Output:  
Last shift:      10/25 0701 - 10/25 190 In: 420.5 [I.V.:420.5] Out: 0 Last 3 shifts: 10/23 190 - 10/25 0700 In: 59335.9 [I.V.:72397.4] Out: 315 [Urine:313] Intake/Output Summary (Last 24 hours) at 10/25/2019 1016 Last data filed at 10/25/2019 4754 Gross per 24 hour Intake 15268.55 ml Output 170 ml Net 88805.55 ml Hemodynamics:  
PAP:    
Wedge:    
CVP:  CVP (mmHg): 12 mmHg (10/25/19 0400) CO:    
CI:    
SVR:    
PVR:     
 
Ventilator Settings: 
Ventilator Mode: PRVC Respiratory Rate Back-Up Rate: 400 Insp Time (sec): 0.9 sec I:E Ratio: 1:2.4 Ventilator Volumes Vt Set (ml): 400 ml Vt Exhaled (Machine Breath) (ml): 414 ml Vt Spont (ml): 589 ml Ve Observed (l/min): 8.2 l/min Ventilator Pressures PIP Observed (cm H2O): 38 cm H2O Plateau Pressure (cm H2O): 17 cm H2O 
MAP (cm H2O): 14 PEEP/VENT (cm H2O): 5 cm H20 Auto PEEP Observed (cm H2O): 7.6 cm H2O Physical Exam: 
General:  Intubated and sedated Head:  Normocephalic, without obvious abnormality, atraumatic. Eyes:  Conjunctivae/corneas clear. PERRL. Throat: Lips, mucosa, and tongue normal. Teeth and gums normal. ET tube in place. Neck: Supple, symmetrical, trachea midline, no adenopathy, no carotid bruit, and no JVD Lungs:   Clear to auscultation bilaterally. Heart:  Tachy with reg rhythm, S1, S2 normal, no murmur, click, rub or gallop. Abdomen:   Diffuse ecchymosis and anasarca Extremities: 2+ Edema Pulses: 2+ and symmetric all extremities. Neurologic: Grossly nonfocal  
 
DATA:  
Current Facility-Administered Medications Medication Dose Route Frequency  calcium chloride 1 g in 0.9% sodium chloride 250 mL IVPB  1 g IntraVENous ONCE  
 0.9% sodium chloride infusion 250 mL  250 mL IntraVENous PRN  
 insulin regular (NOVOLIN R, HUMULIN R) 100 Units in 0.9% sodium chloride 100 mL infusion  0-50 Units/hr IntraVENous TITRATE  insulin lispro (HUMALOG) injection   SubCUTAneous TIDAC  glucose chewable tablet 16 g  4 Tab Oral PRN  
 glucagon (GLUCAGEN) injection 1 mg  1 mg IntraMUSCular PRN  
 meropenem (MERREM) 500 mg in 0.9% sodium chloride (MBP/ADV) 50 mL  0.5 g IntraVENous Q12H  ipratropium (ATROVENT) 0.02 % nebulizer solution 0.5 mg  0.5 mg Nebulization Q8H RT  
 levalbuterol (XOPENEX) nebulizer soln 1.25 mg/3 mL  1.25 mg Nebulization Q8H RT  
 fentaNYL (PF) 1,500 mcg/30 mL (50 mcg/mL) infusion  0-200 mcg/hr IntraVENous CONTINUOUS  
 fentaNYL citrate (PF) injection 25 mcg  25 mcg IntraVENous Q2H PRN  
 dexmedeTOMidine in 0.9 % NaCl (PRECEDEX) 400 mcg/100 mL (4 mcg/mL) infusion soln  0.2-0.7 mcg/kg/hr IntraVENous TITRATE  famotidine (PF) (PEPCID) 20 mg in sodium chloride 0.9% 10 mL injection  20 mg IntraVENous Q24H  
 insulin lispro (HUMALOG) injection   SubCUTAneous Q6H  
 theophylline ER (DAVID-24) capsule 200 mg  200 mg Oral Q24H  
 HYDROcodone-acetaminophen (NORCO) 7.5-325 mg per tablet 1 Tab  1 Tab Oral QID PRN  
 HYDROmorphone (DILAUDID) syringe 0.5 mg  0.5 mg IntraVENous Q4H PRN  
 PHENYLephrine (HUNTER-SYNEPHRINE) 100 mg in 0.9% sodium chloride 250 mL infusion   mcg/min IntraVENous TITRATE  
 NOREPINephrine (LEVOPHED) 32 mg in dextrose 5% 250 mL infusion  2-200 mcg/min IntraVENous TITRATE  vasopressin (VASOSTRICT) 20 Units in 0.9% sodium chloride 100 mL infusion  0.04 Units/min IntraVENous CONTINUOUS  
 sodium bicarbonate (8.4%) 150 mEq in dextrose 5% 1,000 mL infusion   IntraVENous CONTINUOUS  
 alteplase (CATHFLO) 1 mg in sterile water (preservative free) 1 mL injection  1 mg InterCATHeter PRN  
 ALPRAZolam (XANAX) tablet 0.25 mg  0.25 mg Oral Q8H PRN  
 influenza vaccine 2019-20 (6 mos+)(PF) (FLUARIX/FLULAVAL/FLUZONE QUAD) injection 0.5 mL  0.5 mL IntraMUSCular PRIOR TO DISCHARGE  guaiFENesin ER (MUCINEX) tablet 1,200 mg  1,200 mg Oral Q12H  sorbitol 70 % solution 30 mL  30 mL Oral DAILY PRN  
 sodium chloride (NS) flush 5-40 mL  5-40 mL IntraVENous Q8H  
 sodium chloride (NS) flush 5-40 mL  5-40 mL IntraVENous PRN  
 acetaminophen (TYLENOL) tablet 650 mg  650 mg Oral Q4H PRN  
 ondansetron (ZOFRAN) injection 4 mg  4 mg IntraVENous Q4H PRN  
 albuterol (PROVENTIL VENTOLIN) nebulizer solution 2.5 mg  2.5 mg Nebulization Q2H PRN  
 dextrose 10% infusion 0-250 mL  0-250 mL IntraVENous PRN  
 methylPREDNISolone (PF) (SOLU-MEDROL) injection 60 mg  60 mg IntraVENous Q6H  
 arformoterol (BROVANA) neb solution 15 mcg  15 mcg Nebulization BID RT  
 budesonide (PULMICORT) 500 mcg/2 ml nebulizer suspension  500 mcg Nebulization BID RT  
 nicotine (NICODERM CQ) 14 mg/24 hr patch 1 Patch  1 Patch TransDERmal DAILY  hydrOXYzine HCl (ATARAX) tablet 25 mg  25 mg Oral QID PRN  
 rosuvastatin (CRESTOR) tablet 20 mg  20 mg Oral DAILY  zolpidem (AMBIEN) tablet 5 mg  5 mg Oral QHS PRN Labs: 
Recent Labs 10/25/19 
7849 10/25/19 
1709 10/24/19 
2333  10/24/19 
0400  10/23/19 
1122  10/23/19 
3533 WBC  --  11.3*  --   --  8.2  --   --   --  13.3* HGB 8.5* 9.1* 11.1*   < > 6.4*   < > 7.1*   < > 9.3* HCT 25.0* 27.1* 33.1*   < > 18.4*   < > 21.0*   < > 27.3*  
PLT  --  70*  --   --  109*  --  119*  --  77*  
 < > = values in this interval not displayed. Recent Labs 10/25/19 
1116 10/25/19 
1708 10/24/19 
2333 10/24/19 
1620  10/24/19 
7814 10/24/19 
0400 10/23/19 
1535 10/23/19 
0437  10/23/19 
3206 * 130* 130* 132*   < > 132* 132* 135* 129*  --  137  
K 5.8* 5.5* 5.6* 5.0   < > 5.0 5.0 4.8 3.5  --  4.3 CL 94* 95* 96* 97   < > 96* 98 102 98  --  105 CO2 28 23 26 27   < > 25 25 25 23  --  24 * 154* 204* 96   < > 267* 237* 154* 380*  --  124* BUN 59* 58* 58* 57*   < > 53* 54* 50* 44*  --  50* CREA 2.94* 2.84* 2.75* 2.61*   < > 2.55* 2.36* 2.01* 1.68*  --  1.78* CA 6.1* 6.1* 6.0* 6.8*   < > 5.7* 6.2* 6.6* 5.8*  --  5.2*  
MG 2.1  --  1.9 2.0   < > 2.0 2.1  --  2.1   < >  --   
PHOS  --   --   --   --   --  7.4* 7.0*  --  4.5  --   --   
ALB  --  2.6*  --   --   --   --  2.3* 1.8* 2.1*  --  1.8* SGOT  --  >2,000*  --   --   --   --  >2,000* 413* 209*  --  181* ALT  --  1,023*  --   --   --   --  845* 154* 64  --  71 INR  --   --   --   --   --   --  1.7*  --   --   --  1.3*  
 < > = values in this interval not displayed. Recent Labs 10/25/19 
7680 10/24/19 
6392 10/23/19 
1530 PH 7.28* 7.21* 7.30* PCO2 52* 53* 43 PO2 81 91 156* HCO3 23 21* 21* FIO2 40 40 40 Imaging:  I have personally reviewed the patients radiographs and reports. IMPRESSION:  
· Hypovolemic shock · Acute on chronic resp failure · Retroperitonel bleed · DIC 
· COPD  
· + Parainfluenza · Shock liver · Hypocalcemia · STEPHANIE · Hyperkalemia PLAN:  
· Full vent support continue to monitor ABGs · Srini/Levophed/vasopress- wean levophed first 
· Plts/FFP/blood products- trying to maintain a 1:3 ratio · Replete w/  Calcium chloride again today · Nephro following: will start CRRT unless comfort care decision · Keep  IV steroids at current dose · Insulin gtt · Pulmicort/Brovana · xopenex- decrease to q8 
· atrovent- decrease to q8 
· atrial tach- can't give metoprolol due to hypotension · Doxy for COPD exacerbation · Mucinex · BG control · Internal patton My assessment/plan was discussed with Dr. Kathy Pappas, ICU Attending Physician.  
 
Jocelyn Perry MD 
Family Medicine Resident, PGY-2

## 2019-10-25 NOTE — PROGRESS NOTES
Palliative Medicine Social Work ADDENDUM 4:30 PM 
Family surrounded patient at bedside. Patient pronounced dead. Family crying opening, supporting each other with physical affection and words of support. Provided ministry of presence 13:35 PM 
Family has decided to focus on comfort. Patient is DNR. They have requested pain meds, but would like to keep patient on vent - they are very concerned with her struggling to breath (she has had lifelong breathing trouble). Thank you for the opportunity to be involved in the care of Ms. Joy Kirkland and her family. Lang Bamberger, LMSW, Supervisee in Social Work Palliative Medicine  [phone]

## 2019-10-25 NOTE — DIABETES MGMT
[facility] Progress Note Recommendations/ Comments: Review for hyperglycemia on IV steroids. Pt continues on an insulin drip currently running at 3.8 units per hour. Will continue to follow. Current hospital DM medication: insulin drip Chart reviewed on Berenice Fabian. Patient is a 69 y.o. female with known Type 2 Diabetes on oral agent (monotherapy): Januvia 100 mg daily 
insulin injections: Lantus : 14-16 units daily at home. A1c:  
Lab Results Component Value Date/Time Hemoglobin A1c 8.4 (H) 10/18/2019 08:56 PM  
 
 
Recent Glucose Results:  
Lab Results Component Value Date/Time  (H) 10/25/2019 07:26 AM  
  (H) 10/25/2019 04:44 AM  
  (H) 10/24/2019 11:33 PM  
 GLUCPOC 185 (H) 10/25/2019 12:05 PM  
 GLUCPOC 185 (H) 10/25/2019 11:05 AM  
 GLUCPOC 196 (H) 10/25/2019 10:00 AM  
  
 
Lab Results Component Value Date/Time Creatinine 2.94 (H) 10/25/2019 07:26 AM  
 
Estimated Creatinine Clearance: 15.1 mL/min (A) (based on SCr of 2.94 mg/dL (H)). Active Orders Diet DIET NPO  
  
 
PO intake:  
No data found. Will continue to follow as needed. Thank you Lupe Nixon RD, CDE Time spent: 3 min

## 2019-10-25 NOTE — PROGRESS NOTES
Noted goals to focus on comfort. Please consult if nutrition support warranted/ appropriate at a later time. Karen Leon RDN Office: [phone] Pager: [phone]

## 2019-10-25 NOTE — PROGRESS NOTES
[facility] Name: Andria Brooks MRN: [id number] : 1945 Hospital: [address]  Date: 10/25/2019 IMPRESSION:  
· Acute on chronic respiratory failure · COPD/ Asthma exacerbation due to parainfluenza · Parainfluenza + · Retroperitoneal bleed · Hypotension · STEPHANIE PLAN:  
· Full vent support · Srini/Levophed/vasopress · Plts/FFP/blood products- trying to maintain a 1:3 ratio at this point to make sure patient able to coagulate · MOre plts ordered · Calcium chloride · Appears to be a systemic issue of lack of coagulation at this point. · Keep  IV steroids at current dose · Insulin gtt · Pulmicort/Brovana · xopenex-  q8  
· atrovent-  q8 · Cardiology following for atrial tach- can't give metoprolol due to hypotension · Doxy finishing up · Mucinex · BG control · Internal patton · Discussed with Dr. Fareed Sanchez 
· Requested a family meeting and will be meeting with family this AM to re-address goals of care. Pt Still hypocoagulable despite massive amounts of blood products. Mentals status has declined and progressively hypotensive again overnight. Pt critically ill, on multiple pressors and at great risk for cardiac arrest. cctime 41 min outside procedures Subjective/Interval History:  
I have reviewed the flowsheet and previous days notes. Overnight events: 
Intubated On vasopressin/srini/levophed on 185. Down to 80 meq last night but then progressively worsened Attempts to do an arterial line this morning again unsuccessful. Currently has gotten: 14 units pRBC, 4 FFP, 2 plts, 2 cryo CXR with ? Edema vs. Developing right sided PNA Less responsive this morning. Propofol turned off overnight. Once Fent turned off pt will nod to questions of pain. Objective:  
Vital Signs:   
Visit Vitals /65 Pulse (!) 142 Temp 98.7 °F (37.1 °C) Resp 20 Ht 5' 1\" (1.549 m) Wt 69.1 kg (152 lb 6.4 oz) SpO2 93% BMI 28.80 kg/m² O2 Device: Ventilator, Endotracheal tube O2 Flow Rate (L/min): 3 l/min Temp (24hrs), Av.8 °F (37.7 °C), Min:98.7 °F (37.1 °C), Max:100.5 °F (38.1 °C) Intake/Output:  
Last shift:      10/25 0701 - 10/25 1900 In: 420.5 [I.V.:420.5] Out: 0 Last 3 shifts: 10/23 1901 - 10/25 0700 In: 99637.9 [I.V.:58403.4] Out: 315 [Urine:313] Intake/Output Summary (Last 24 hours) at 10/25/2019 5577 Last data filed at 10/25/2019 4238 Gross per 24 hour Intake 92935.55 ml Output 182 ml Net 21778.55 ml Physical Exam: 
 
General: []Ill appearing [x] INtubated [] []No acute distress [] [] HEENT: []Icteric [x]PERRL []  
 [x]Anicteric Mucosa:[]moist   []dry [] Resp: []Wheeze [x]Clear to ascultation bilaterally []Accessory muscle use  
 []Rales []Chest tube:  []Air leak [] []Ronchi []Crepitus []  
[]  
  
CV: [x]Regular [x]Tachycardia []tachy  
 []Irregular []Bradycardic []  
 []Murmur []S3 [] []Edema []S4 []  
  
GI: []Soft  []NG Tube Bowel sounds: []present [x]absent []Firm []PEG []  
 [x]Distended  []  
  
: []Ewing []Hematuria [] []Clear urine [x]Edema [] MSK:  
 []SCDs [x]Edema [] []No deformities [] []  
  
Skin: [x]Warm []Dry  [] []Cool []Moist [] []Hot  []Diaphoretic [] []Rash  []Cyanosis [x] Bruising abdominal wall N-psych: [x]Sedated  [x]Oriented []Agitated []Alert  Follows commands:  
[x]Yes  []No [] Devices: [x]PICC []Trach []Chest tube: Air leak? []Y/[]N  
 []Central Line []PA Catheter [] DATA: 
Labs: 
Recent Labs 10/25/19 
4681 10/25/19 
8690 10/24/19 
2333  10/24/19 
0400  10/23/19 
1122  10/23/19 
4455 WBC  --  11.3*  --   --  8.2  --   --   --  13.3* HGB 8.5* 9.1* 11.1*   < > 6.4*   < > 7.1*   < > 9.3* HCT 25.0* 27.1* 33.1*   < > 18.4*   < > 21.0*   < > 27.3*  
PLT  --  70*  --   --  109*  --  119*  --  77* < > = values in this interval not displayed. Recent Labs 10/25/19 
1976 10/24/19 
2333 10/24/19 
1620 10/24/19 
1154 10/24/19 
0338 10/24/19 
0400 10/23/19 
1535 10/23/19 
0437  10/23/19 
1848 * 130* 132* 132* 132* 132* 135* 129*  --  137  
K 5.5* 5.6* 5.0 4.7 5.0 5.0 4.8 3.5  --  4.3 CL 95* 96* 97 98 96* 98 102 98  --  105 CO2 23 26 27 25 25 25 25 23  --  24 * 204* 96 184* 267* 237* 154* 380*  --  124* BUN 58* 58* 57* 53* 53* 54* 50* 44*  --  50* CREA 2.84* 2.75* 2.61* 2.64* 2.55* 2.36* 2.01* 1.68*  --  1.78* CA 6.1* 6.0* 6.8* 7.2* 5.7* 6.2* 6.6* 5.8*  --  5.2*  
MG  --  1.9 2.0 1.9 2.0 2.1  --  2.1   < >  --   
PHOS  --   --   --   --  7.4* 7.0*  --  4.5  --   --   
ALB 2.6*  --   --   --   --  2.3* 1.8* 2.1*  --  1.8* TBILI 1.0  --   --   --   --  0.6 0.4 0.7  --  0.5 SGOT >2,000*  --   --   --   --  >2,000* 413* 209*  --  181* ALT 1,023*  --   --   --   --  845* 154* 64  --  71 INR  --   --   --   --   --  1.7*  --   --   --  1.3*  
 < > = values in this interval not displayed. Recent Labs 10/25/19 
0246 10/24/19 
4226 10/23/19 
1530 PH 7.28* 7.21* 7.30* PCO2 52* 53* 43 PO2 81 91 156* HCO3 23 21* 21* FIO2 40 40 40 Imaging: 
[x]I have personally reviewed the patients radiographs 
[x]Radiographs reviewed with radiologist 
 []No change from prior, tubes and lines in adequate position []Improved   []Worsening Anuja Cline MD 
[facility]

## 2019-10-25 NOTE — PROGRESS NOTES
1900: Bedside shift change report given to Cristal MURPHY and Jessi Shaw (oncoming nurse) by Mikal Mccann RN (offgoing nurse). Report included the following information SBAR, Kardex, Intake/Output, MAR, Recent Results and Cardiac Rhythm ST. Primary Nurse [id number] [address], RN and Kelsey Bowers RN performed a dual skin assessment on this patient Impairment noted- see wound doc flow sheet. Paul score is 12. Drips: 
Levophed @ 100mcg/min Fentanyl @ 100mcg/hr Srini-synephrine @ 300mcg/min Sodium Bicarb @ 100ml/hr Vasopressin @ 0.04units/min Vent settings: 
Rate: 20 FiO2: 40 
TV: 400 Peep: 5 
1945: Labs drawn. 2000: Assessment completed. Pt intubated and sedated, but able to respond to voice and follow commands. Sinus tach in the 150s on the monitor. Lungs coarse/diminished. Absent bowel sounds. Ewing cath in place. Pt turned, mouth care completed and was suctioned. 2020: 1 unit PRBC started. 2030: Ice bags placed for temp 100.5. Lactic acid came back at 2.9. Titrated Levophed drip down. 2050: Dextrose given for FSBS 78. 
2108: Titrated Levophed drip down. 2200: Dextrose given for BG 80. Titrated Levophed drip down. Pt turned, mouth care and suctioning completed. 2225: Dextrose given for BG 71.  
2330: Labs drawn. Titrated Levophed up. Insulin drip restarted for . 
0000: Reassessment completed. Titrated Levophed drip up. Pt turned, mouth care and suctioning completed. 0045: Pt's BP dropped, Levophed drip titrated up multiple times, see flowsheet. Insulin drip increased for . 
0128: Levophed drip titrated up to 185mcg/min. 5102: Notified pulmonologist of blood pressures and critical labs - calcium and lactic acid. 0141: Insulin drip decreased for . 
0145: 1L NS bolus started. 2gm calcium gluc given. 0215: Bolus stopped due to increase in /132. Pt turned. 0234: Albumin given. 0244: Insulin drip increased for . 
0300: Pt suctioned and mouth care completed. 0330: CXR completed. 0345: Insulin decreased for . 
0400: Reassessment completed. Pt turned, suctioned, and mouth care completed. 8917: Insulin drip increased for . Labs drawn. 1695: Insulin drip decreased for . 
0600: Pt suctioned and mouth care completed. Pt had BM, cleaned and turned. 8217: Insulin drip held for . Chemistry labs drawn from [phone], have not resulted yet, will pass onto next nurse and will call lab.  
0700: Bedside shift change report given to Candice Rodríguez RN (oncoming nurse) by Rob Hedrick RN and Rina Ribeiro RN (offgoing nurse). Report included the following information SBAR, Kardex, Intake/Output, Recent Results and Cardiac Rhythm ST.

## 2019-10-25 NOTE — PROGRESS NOTES
0140: Spoke with Dr. Jeancarlos Gutierrez regarding patients calcium level 6.0, Potassium of 5.6, and lactic acid remains elevated at 3.8. BP continues to decrease with pressor support. Srini maxed out at 300, Vasopressin at 0.4 and Levophed at 185. Order for 1L bolus NS, 2g calcium gluconate and Albumin 25%. 0200: Dr. Jayla Roth updated on patients status. Family aware and updated, questions answered. Patient remains responsive and squeezes bilateral hands and can wiggle toes bilaterally.

## 2019-10-25 NOTE — PROGRESS NOTES
0820- Chart accessed- Critical value taken for primary nurse Calcium 6.1, and Lactic Acid 3.7. Passed on to Primary nurse that spoke to MD- Dr. Vincent Andrews.

## 2019-10-25 NOTE — PROGRESS NOTES
@0700 Bedside and Verbal shift change report given to Griffin Tierney RN (oncoming nurse) by Emma Wang RN (offgoing nurse). Report included the following information SBAR, Kardex, ED Summary, Procedure Summary, Intake/Output, MAR, Accordion, Recent Results, Med Rec Status, Cardiac Rhythm Sinus and Alarm Parameters . @5820  Dr. Paula Ratliff arrives and exams the patient. Plan of care is to obtain BPs on LUE despite PICC line. Will re attempt for A-line access. Goal is to obtain either a Systolic >350 mmHg or MAP >65 mm Hg. Finally will seek to start CRRT today. @0845 /79 (83), Levophed wean to 185 mcg/min. @12 Family wishes for patient to go be DNR no escalation but to keep vasopressors and vent on. If she expires while on it is okay per family but they want her comfortable as well. @5746 Noted patient now asystole on monitor. Examination of patient showed she had no pulse by doppler or heart beat by ausculation. Dr. Shelly Alejandro, [facility]), Dr. Nayeli Lazaro are updated. Lety 13 TOD 2176. [employer] paged and updated to TOD and cause of death. Hair Flores with [employer] provides clearance to release the body to [facility] [place] [place].

## 2019-10-25 NOTE — PROGRESS NOTES
responded to staff request to visit with patient, Tay Miller, in the ICU. Kayys health has continued to decline and her family is struggling with transitioning Tay Miller to comfort care.  met with several members of Mario Camp family in the ICU waiting room. Present were daughter Lian Cochran and her  Jennette Scheuermann, son Raul De La Cruz and his wife Steve Fuentes, and other extended family members. Daughters Owen Martinez and Julian Boland are also present in the hospital, but stepped away from the ICU at this time.  introduced herself and extended support to the family. Family shared their feelings of sadness, grief, and fears around making this decision for their mother. There is a tension between wanting to honor Mario Camp wishes for peace and rest and not desiring to make the wrong decision about ending more aggressive care.  listening to these thoughts and concerns and attempted to re-frame the decision making process as able.  continued to listen intently as family engaged in [address] and remembrance, sharing both moments of laughter and tears, as several family members discussed cherished memories. Family thanked the  for stopping by and were very appreciative of all the care that they have received during this time. Advised of 's availability.  made a follow up visit with Mrs. Tay Miller in the ICU. Tay Miller was lying in bed, is intubated, and appeared to be comfortable. She did not respond to 's voice or touch at this time.  provided blessing, comforting touch, and spoke soothing words at bedside.  will follow up with Tay Miller and her family as able and/or needed Radha Becker. Mohamud Hdez.  Paging Service: [phone])

## 2019-10-25 NOTE — PROGRESS NOTES
[facility] Provider Death Note Called to examine patient who has . No response to verbal and tactile stimuli. No respiratory effort. Absent heart sounds and pulses. Pupils fixed and dilated. Patient pronounced dead at 65.

## 2019-10-25 NOTE — DISCHARGE SUMMARY
Death note: 
 
Admission date: 10/18 Discharge date (date of death): 10/25 at 4:28 pm 
 
Please see daily note for details. The patient was a 68 yo hx of HTN, DM, COPD on 2L O2, presented w/ resp failure, hypoxia, COPD, parainfluenza. Hospital course complicated by severe shock, anemia, retroperitoneal bleed, DIC, ARF. Despite triple pressors, multiple blood/plts/plasma/cryo transfusions, patient continued to worsened. DIC likely a complication of parainfluenza infection. The patient's family decided to make her a DNR and comfort care only. She  on 10/25 at 4:28 pm, cause of death: DIC, parainfluenza, acute blood loss anemia

## 2019-10-25 NOTE — PROGRESS NOTES
Family has decided to compassionately transition pt into comfort measures. Palliative Medicine will be shortly to assist pt with the transition. I updated pt.'s nurse who updated the family.  
 
Bessie Ochoa

## 2019-10-25 NOTE — PROGRESS NOTES
Myke Red [facility] [address] 
[address] [place] [address] 
[phone] Medical Progress Note NAME: Nancy Ann :  1945 MRM:  [id number] Date/Time: 10/25/2019 Subjective: Chief Complaint:  Patient was seen and examined by me. Chart reviewed. Intubated, sedated, on multiple pressors Objective:  
 
 
Vitals:  
 
 
Last 24hrs VS reviewed since prior progress note. Most recent are: 
 
Visit Vitals /65 Pulse (!) 142 Temp 98.7 °F (37.1 °C) Resp 20 Ht 5' 1\" (1.549 m) Wt 69.1 kg (152 lb 6.4 oz) SpO2 93% BMI 28.80 kg/m² SpO2 Readings from Last 6 Encounters:  
10/25/19 93% O2 Flow Rate (L/min): 3 l/min Intake/Output Summary (Last 24 hours) at 10/25/2019 0815 Last data filed at 10/25/2019 3623 Gross per 24 hour Intake 90580.55 ml Output 182 ml Net 26078.55 ml Exam:  
 
Physical Exam: 
 
Gen:  Disheveled, ill-appearing, intubated HEENT:  Pink conjunctivae, sluggish pupils Neck:  Supple, without masses Resp:  No accessory muscle use, CTAB anteriorly Card:  No murmurs, normal S1, S2 without thrills, anasarca Abd:  Soft, non-tender, non-distended, poor BS Musc:  Cool extremities Skin:  Diffused bruising, hematoma around groin Neuro:  Intubated, sedated Psych: Intubated, sedated Medications Reviewed: (see below) Lab Data Reviewed: (see below) 
 
______________________________________________________________________ Medications:  
 
Current Facility-Administered Medications Medication Dose Route Frequency  insulin regular (NOVOLIN R, HUMULIN R) 100 Units in 0.9% sodium chloride 100 mL infusion  0-50 Units/hr IntraVENous TITRATE  insulin lispro (HUMALOG) injection   SubCUTAneous TIDAC  glucose chewable tablet 16 g  4 Tab Oral PRN  
 glucagon (GLUCAGEN) injection 1 mg  1 mg IntraMUSCular PRN  
 meropenem (MERREM) 500 mg in 0.9% sodium chloride (MBP/ADV) 50 mL  0.5 g IntraVENous Q12H  ipratropium (ATROVENT) 0.02 % nebulizer solution 0.5 mg  0.5 mg Nebulization Q8H RT  
 levalbuterol (XOPENEX) nebulizer soln 1.25 mg/3 mL  1.25 mg Nebulization Q8H RT  
 fentaNYL (PF) 1,500 mcg/30 mL (50 mcg/mL) infusion  0-200 mcg/hr IntraVENous CONTINUOUS  
 fentaNYL citrate (PF) injection 25 mcg  25 mcg IntraVENous Q2H PRN  
 dexmedeTOMidine in 0.9 % NaCl (PRECEDEX) 400 mcg/100 mL (4 mcg/mL) infusion soln  0.2-0.7 mcg/kg/hr IntraVENous TITRATE  famotidine (PF) (PEPCID) 20 mg in sodium chloride 0.9% 10 mL injection  20 mg IntraVENous Q24H  
 insulin lispro (HUMALOG) injection   SubCUTAneous Q6H  
 theophylline ER (DAVID-24) capsule 200 mg  200 mg Oral Q24H  
 HYDROcodone-acetaminophen (NORCO) 7.5-325 mg per tablet 1 Tab  1 Tab Oral QID PRN  
 HYDROmorphone (DILAUDID) syringe 0.5 mg  0.5 mg IntraVENous Q4H PRN  
 PHENYLephrine (HUNTER-SYNEPHRINE) 100 mg in 0.9% sodium chloride 250 mL infusion   mcg/min IntraVENous TITRATE  
 NOREPINephrine (LEVOPHED) 32 mg in dextrose 5% 250 mL infusion  2-200 mcg/min IntraVENous TITRATE  vasopressin (VASOSTRICT) 20 Units in 0.9% sodium chloride 100 mL infusion  0.04 Units/min IntraVENous CONTINUOUS  
 sodium bicarbonate (8.4%) 150 mEq in dextrose 5% 1,000 mL infusion   IntraVENous CONTINUOUS  
 alteplase (CATHFLO) 1 mg in sterile water (preservative free) 1 mL injection  1 mg InterCATHeter PRN  
 ALPRAZolam (XANAX) tablet 0.25 mg  0.25 mg Oral Q8H PRN  
 influenza vaccine 2019-20 (6 mos+)(PF) (FLUARIX/FLULAVAL/FLUZONE QUAD) injection 0.5 mL  0.5 mL IntraMUSCular PRIOR TO DISCHARGE  guaiFENesin ER (MUCINEX) tablet 1,200 mg  1,200 mg Oral Q12H  sorbitol 70 % solution 30 mL  30 mL Oral DAILY PRN  
 sodium chloride (NS) flush 5-40 mL  5-40 mL IntraVENous Q8H  
 sodium chloride (NS) flush 5-40 mL  5-40 mL IntraVENous PRN  
 acetaminophen (TYLENOL) tablet 650 mg  650 mg Oral Q4H PRN  
 ondansetron (ZOFRAN) injection 4 mg  4 mg IntraVENous Q4H PRN  
  albuterol (PROVENTIL VENTOLIN) nebulizer solution 2.5 mg  2.5 mg Nebulization Q2H PRN  
 dextrose 10% infusion 0-250 mL  0-250 mL IntraVENous PRN  
 methylPREDNISolone (PF) (SOLU-MEDROL) injection 60 mg  60 mg IntraVENous Q6H  
 arformoterol (BROVANA) neb solution 15 mcg  15 mcg Nebulization BID RT  
 budesonide (PULMICORT) 500 mcg/2 ml nebulizer suspension  500 mcg Nebulization BID RT  
 nicotine (NICODERM CQ) 14 mg/24 hr patch 1 Patch  1 Patch TransDERmal DAILY  hydrOXYzine HCl (ATARAX) tablet 25 mg  25 mg Oral QID PRN  
 rosuvastatin (CRESTOR) tablet 20 mg  20 mg Oral DAILY  zolpidem (AMBIEN) tablet 5 mg  5 mg Oral QHS PRN Lab Review:  
 
Recent Labs 10/25/19 
3855 10/25/19 
7910 10/24/19 
2333  10/24/19 
0400  10/23/19 
1122  10/23/19 
1121 WBC  --  11.3*  --   --  8.2  --   --   --  13.3* HGB 8.5* 9.1* 11.1*   < > 6.4*   < > 7.1*   < > 9.3* HCT 25.0* 27.1* 33.1*   < > 18.4*   < > 21.0*   < > 27.3*  
PLT  --  70*  --   --  109*  --  119*  --  77*  
 < > = values in this interval not displayed. Recent Labs 10/25/19 
1393 10/24/19 
2333 10/24/19 
1620 10/24/19 
1154 10/24/19 
5500 10/24/19 
0400 10/23/19 
1535 10/23/19 
0437  10/23/19 
9073 * 130* 132* 132* 132* 132* 135* 129*  --  137  
K 5.5* 5.6* 5.0 4.7 5.0 5.0 4.8 3.5  --  4.3 CL 95* 96* 97 98 96* 98 102 98  --  105 CO2 23 26 27 25 25 25 25 23  --  24 * 204* 96 184* 267* 237* 154* 380*  --  124* BUN 58* 58* 57* 53* 53* 54* 50* 44*  --  50* CREA 2.84* 2.75* 2.61* 2.64* 2.55* 2.36* 2.01* 1.68*  --  1.78* CA 6.1* 6.0* 6.8* 7.2* 5.7* 6.2* 6.6* 5.8*  --  5.2*  
MG  --  1.9 2.0 1.9 2.0 2.1  --  2.1   < >  --   
PHOS  --   --   --   --  7.4* 7.0*  --  4.5  --   --   
ALB 2.6*  --   --   --   --  2.3* 1.8* 2.1*  --  1.8* TBILI 1.0  --   --   --   --  0.6 0.4 0.7  --  0.5 SGOT >2,000*  --   --   --   --  >2,000* 413* 209*  --  181* ALT 1,023*  --   --   --   --  845* 154* 64  --  71  
 INR  --   --   --   --   --  1.7*  --   --   --  1.3*  
 < > = values in this interval not displayed. Lab Results Component Value Date/Time Glucose (POC) 148 (H) 10/25/2019 07:25 AM  
 Glucose (POC) 128 (H) 10/25/2019 06:44 AM  
 Glucose (POC) 144 (H) 10/25/2019 05:49 AM  
 Glucose (POC) 152 (H) 10/25/2019 04:48 AM  
 Glucose (POC) 138 (H) 10/25/2019 03:43 AM  
 
 
  
Assessment / Plan:  
 
Principal Problem: 
 
70 yo hx of HTN, DM, COPD on 2L O2, presented w/ resp failure, hypoxia, COPD, parainfluenza. Complicated by severe shock, anemia, retroperitoneal bleed, DIC, ARF 
 
1) Hypovolemic shock: still active. Due to retroperitoneal bleed, DIC. Trying to wean Levophed, Srini gtt, and Vasopressin. Also on empiric IV meropenem. Cont IVF, blood products. Pulm following, trying to place A-line today 2) Retroperitoneal bleeding: seems to be slowing down. Likely from DIC (as a complication of parainfluenza?). S/p IR embolization of lumbar arteries on 10/22. Cont supportive care with blood products 3) Acute blood loss anemia/thrombocytopenia/DIC: due to parainfluenza? Chassell Mort S/p multiple units of RBCs, plts, FFP, cryo, DDAVP. Will monitor coags, fibrinogen, LDH, haptoglobin. Cont blood/plasma/cryo transfusion as needed 4) Acute resp failure/hypoxia: due to COPD, parainfluenza. Intubated on 10/22. Chest CT neg for PE. Vent management per Pulm 5) Oliguric ARF: due to shock. Likely will need dialysis today. Renal following 6) Shock liver: due to above issues. Cont supportive care, monitor LFTs 7) COPD exacerbation/parainfluenza: cont IV steroids (wean as tolerate), nebs, LABA/ICS, Isauro, Doxy. Was on 2L O2 at home 8) DM type 2: A1C 8.4%. Cont insulin gtt 9) Hypocalcemia: cont IV repletion. Will improve with dialysis Total time spent with patient: 35 Minutes (critical care) Care Plan discussed with: Patient, nursing, pulm Discussed:  Care Plan Prophylaxis:  SCD's Disposition:  SNF/LTC 
        
___________________________________________________ Attending Physician: Paige Gaona MD

## 2019-10-25 NOTE — PROGRESS NOTES
Myke Paul [place] 80 Baby Carolin YOB: 1945 Assessment & Plan:  
STEPHANIE Hyperkalemia Hemorrhagic shock DIC 
RP bleed Resp failure Sepsis Rec: 
Start CRRT unless planning to transition to comfort care Poor prognosis, but will not be able to manage K medically Agree with re-addressing goals of care. D/w Dr. Anayeli Vasquez Subjective:  
CC: f/u STEPHANIE 
HPI: Low volume, dark urine. K high, got some temporizing treatment. On multiple pressors for shock and on vent for resp failure ROS: unable to obtain due to pt condition Current Facility-Administered Medications Medication Dose Route Frequency  calcium chloride 1 g in 0.9% sodium chloride 250 mL IVPB  1 g IntraVENous ONCE  
 0.9% sodium chloride infusion 250 mL  250 mL IntraVENous PRN  
 insulin regular (NOVOLIN R, HUMULIN R) 100 Units in 0.9% sodium chloride 100 mL infusion  0-50 Units/hr IntraVENous TITRATE  insulin lispro (HUMALOG) injection   SubCUTAneous TIDAC  glucose chewable tablet 16 g  4 Tab Oral PRN  
 glucagon (GLUCAGEN) injection 1 mg  1 mg IntraMUSCular PRN  
 meropenem (MERREM) 500 mg in 0.9% sodium chloride (MBP/ADV) 50 mL  0.5 g IntraVENous Q12H  ipratropium (ATROVENT) 0.02 % nebulizer solution 0.5 mg  0.5 mg Nebulization Q8H RT  
 levalbuterol (XOPENEX) nebulizer soln 1.25 mg/3 mL  1.25 mg Nebulization Q8H RT  
 fentaNYL (PF) 1,500 mcg/30 mL (50 mcg/mL) infusion  0-200 mcg/hr IntraVENous CONTINUOUS  
 fentaNYL citrate (PF) injection 25 mcg  25 mcg IntraVENous Q2H PRN  
 dexmedeTOMidine in 0.9 % NaCl (PRECEDEX) 400 mcg/100 mL (4 mcg/mL) infusion soln  0.2-0.7 mcg/kg/hr IntraVENous TITRATE  famotidine (PF) (PEPCID) 20 mg in sodium chloride 0.9% 10 mL injection  20 mg IntraVENous Q24H  
 insulin lispro (HUMALOG) injection   SubCUTAneous Q6H  
 theophylline ER (DAVID-24) capsule 200 mg  200 mg Oral Q24H  HYDROcodone-acetaminophen (NORCO) 7.5-325 mg per tablet 1 Tab  1 Tab Oral QID PRN  
 HYDROmorphone (DILAUDID) syringe 0.5 mg  0.5 mg IntraVENous Q4H PRN  
 PHENYLephrine (HUNTER-SYNEPHRINE) 100 mg in 0.9% sodium chloride 250 mL infusion   mcg/min IntraVENous TITRATE  
 NOREPINephrine (LEVOPHED) 32 mg in dextrose 5% 250 mL infusion  2-200 mcg/min IntraVENous TITRATE  vasopressin (VASOSTRICT) 20 Units in 0.9% sodium chloride 100 mL infusion  0.04 Units/min IntraVENous CONTINUOUS  
 sodium bicarbonate (8.4%) 150 mEq in dextrose 5% 1,000 mL infusion   IntraVENous CONTINUOUS  
 alteplase (CATHFLO) 1 mg in sterile water (preservative free) 1 mL injection  1 mg InterCATHeter PRN  
 ALPRAZolam (XANAX) tablet 0.25 mg  0.25 mg Oral Q8H PRN  
 influenza vaccine 2019-20 (6 mos+)(PF) (FLUARIX/FLULAVAL/FLUZONE QUAD) injection 0.5 mL  0.5 mL IntraMUSCular PRIOR TO DISCHARGE  guaiFENesin ER (MUCINEX) tablet 1,200 mg  1,200 mg Oral Q12H  sorbitol 70 % solution 30 mL  30 mL Oral DAILY PRN  
 sodium chloride (NS) flush 5-40 mL  5-40 mL IntraVENous Q8H  
 sodium chloride (NS) flush 5-40 mL  5-40 mL IntraVENous PRN  
 acetaminophen (TYLENOL) tablet 650 mg  650 mg Oral Q4H PRN  
 ondansetron (ZOFRAN) injection 4 mg  4 mg IntraVENous Q4H PRN  
 albuterol (PROVENTIL VENTOLIN) nebulizer solution 2.5 mg  2.5 mg Nebulization Q2H PRN  
 dextrose 10% infusion 0-250 mL  0-250 mL IntraVENous PRN  
 methylPREDNISolone (PF) (SOLU-MEDROL) injection 60 mg  60 mg IntraVENous Q6H  
 arformoterol (BROVANA) neb solution 15 mcg  15 mcg Nebulization BID RT  
 budesonide (PULMICORT) 500 mcg/2 ml nebulizer suspension  500 mcg Nebulization BID RT  
 nicotine (NICODERM CQ) 14 mg/24 hr patch 1 Patch  1 Patch TransDERmal DAILY  hydrOXYzine HCl (ATARAX) tablet 25 mg  25 mg Oral QID PRN  
 rosuvastatin (CRESTOR) tablet 20 mg  20 mg Oral DAILY  zolpidem (AMBIEN) tablet 5 mg  5 mg Oral QHS PRN Objective:  
 
Vitals: Blood pressure 100/79, pulse (!) 142, temperature 98.7 °F (37.1 °C), resp. rate 20, height 5' 1\" (1.549 m), weight 69.1 kg (152 lb 6.4 oz), SpO2 93 %. Temp (24hrs), Av.8 °F (37.7 °C), Min:98.7 °F (37.1 °C), Max:100.5 °F (38.1 °C) Intake and Output: 
10/25 0701 - 10/25 1900 In: 420.5 [I.V.:420.5] Out: 0  
10/23 1901 - 10/25 0700 In: 52107.9 [I.V.:17054.4] Out: 315 [Urine:313] Physical Exam:              
GENERAL ASSESSMENT: intubated, eyes open, not responsive HEENT: ETT in place CHEST: Vent BS bilat HEART: S1S2 ABDOMEN: Soft,NT 
: +Ewing EXTREMITY: no EDEMA 
 
 
   
ECG/rhythm: 
 
Data Review No results for input(s): TNIPOC in the last 72 hours. No lab exists for component: ITNL Recent Labs 10/23/19 
4510 * CKMB 10.5* TROIQ 0.16* Recent Labs 10/25/19 
2577 10/25/19 
1654 10/24/19 
2333 10/24/19 
1620  10/24/19 
9370 10/24/19 
0400  10/23/19 
1535  10/23/19 
1122  10/23/19 
6804 * 130* 130* 132*   < > 132* 132*  --  135*  --   --   --  129*  
K 5.8* 5.5* 5.6* 5.0   < > 5.0 5.0  --  4.8  --   --   --  3.5 CL 94* 95* 96* 97   < > 96* 98  --  102  --   --   --  98  
CO2 28 23 26 27   < > 25 25  --  25  --   --   --  23 BUN 59* 58* 58* 57*   < > 53* 54*  --  50*  --   --   --  44* CREA 2.94* 2.84* 2.75* 2.61*   < > 2.55* 2.36*  --  2.01*  --   --   --  1.68* * 154* 204* 96   < > 267* 237*  --  154*  --   --   --  380* PHOS  --   --   --   --   --  7.4* 7.0*  --   --   --   --   --  4.5 MG 2.1  --  1.9 2.0   < > 2.0 2.1  --   --   --   --   --  2.1 CA 6.1* 6.1* 6.0* 6.8*   < > 5.7* 6.2*  --  6.6*  --   --   --  5.8* ALB  --  2.6*  --   --   --   --  2.3*  --  1.8*  --   --   --  2.1* WBC  --  11.3*  --   --   --   --  8.2  --   --   --   --   --  13.3* HGB 8.5* 9.1* 11.1*  --    < > 6.4* 6.4*   < >  --    < > 7.1*   < > 9.3* HCT 25.0* 27.1* 33.1*  --    < > 19.9* 18.4*   < >  --    < > 21.0*   < > 27.3*  
 PLT  --  70*  --   --   --   --  109*  --   --   --  119*  --  77*  
 < > = values in this interval not displayed. Recent Labs 10/24/19 
0400 10/23/19 
0039 INR 1.7* 1.3* PTP 17.2* 13.0* APTT 64.0*  --   
 
Needs: urine analysis, urine sodium, protein and creatinine No results found for: ALEXX ANDRADE 
 
 
: Rae Taylor MD 
10/25/2019 [facility]: 
[email or web address] [place] office: 
[address] [place] [address], [address] [place], [address] [facility] Phone: [phone] Fax :     [phone] [place] office: 
[address] [facility], [address] Phone - [phone] Fax - [phone]

## 2019-10-25 NOTE — PROGRESS NOTES
requested, patient passed, family at bedside. Met with family in waiting area of unit. Provided spiritual presence and listening as they expressed their feelings of grief and bereavement telling stories of their loved one. Say this moment is bittersweet in that she has been in and out of the hospital much of her life and is no longer suffering, but, on the other hand, their hearts grieve over losing her from their lives. Also met with family at bedside providing the same comfort and support. Family wishes to spend quiet time alone with Tomy Briceño as a family as they say their good byes and await other family--granddaughter, Gretel Lawler, arriving from [facility] is less than 2 hrs. away. All appeared comforted as a result of this visit and expressed gratitude for this visit. Will continue to follow up as needed and upon request as able. Visited by Rev. Saskia Duggan MDiv, [facility], [facility]  paging service: [phone])

## 2019-10-25 NOTE — PROGRESS NOTES
Palliative Medicine [place]: [phone] ([phone]) [facility]: [phone] ([phone]) Code Status: Full Code Advance Care Planning: 
Advance Care Planning 10/23/2019 Patient's Healthcare Decision Maker is: Legal Next of Kin Confirm Advance Directive None Patient Would Like to Complete Advance Directive Unable Primary Decision Maker: Dusty MEDINA) - Daughter - [phone] Primary Decision Maker: Neo MEDINA) - Daughter - [phone] Primary Decision Maker: Feroz MEDINA) - Son - [phone] Primary Decision Maker: Aldo MEDINA) - Daughter - [phone] Patient / Family Encounter Documentation Participants (names): Patient family daughter/JA Crorea ([place]), son/JA Diaz, [place] Saskia Saenz; Palliative Medicine (Layman Mili NP); Latisha Damico Narrative:  
 
Jonathon Hayes NP and I met briefly with patient family in seating area outside ICU. They were in tears and understand that patient is dying. Sounds like whole family understands this except Sandi who they consider MPOA (however we still don't have this documentation). They are working with her on letting go. Family understands that medical staff has done everything they could, all interventions exhausted, patient is tired. Validated that we are watching patient for direction  and that right now, she's telling us she is tired. Family is waiting for other family to arrive - one from [place] this afternoon. We discussed that patient may not last that long and it will be important to discuss her code status in the likely event her heart stops. They will all discuss. After we met, Sandi returned to 3rd floor and Saskia Saenz took her to see patient. There were more tears - hopefully she is coming to some acceptance patient is very close to end and family can make decisions toward a peaceful passing. Psychosocial Issues Identified:  
 
Patient has extensive/loving family support. 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 Patient seems to be matriarch/nucleous/binder of family -- if she continue to decline, family will need a lot of support in facing difficult decisions that could involve letting go Goals of Care / Plan:  
 
Family understanding patient is near end - daughter Sj Newby having touch time accepting. Family is also waiting for other family to arrive this afternoon to say goodbye. Patient remains full code Thank you for the opportunity to be involved in the care of Ms. Inder Lopez and her family. Caleb Paiz LMSW, Supervisee in Social Work Palliative Medicine  [phone]

## 2019-10-26 LAB
ABO + RH BLD: NORMAL
BLD PROD TYP BPU: NORMAL
BLOOD GROUP ANTIBODIES SERPL: NORMAL
BPU ID: NORMAL
CROSSMATCH RESULT,%XM: NORMAL
SPECIMEN EXP DATE BLD: NORMAL
STATUS OF UNIT,%ST: NORMAL
UNIT DIVISION, %UDIV: 0

## 2019-10-28 LAB
GLUCOSE BLD STRIP.AUTO-MCNC: NORMAL MG/DL (ref 65–100)
SERVICE CMNT-IMP: NORMAL